# Patient Record
Sex: MALE | Race: WHITE | HISPANIC OR LATINO
[De-identification: names, ages, dates, MRNs, and addresses within clinical notes are randomized per-mention and may not be internally consistent; named-entity substitution may affect disease eponyms.]

---

## 2017-01-03 ENCOUNTER — APPOINTMENT (OUTPATIENT)
Dept: FAMILY MEDICINE | Facility: CLINIC | Age: 72
End: 2017-01-03

## 2017-01-03 VITALS
DIASTOLIC BLOOD PRESSURE: 66 MMHG | HEART RATE: 77 BPM | RESPIRATION RATE: 12 BRPM | SYSTOLIC BLOOD PRESSURE: 141 MMHG | OXYGEN SATURATION: 97 % | TEMPERATURE: 98.2 F

## 2017-04-07 ENCOUNTER — NON-APPOINTMENT (OUTPATIENT)
Age: 72
End: 2017-04-07

## 2017-04-07 ENCOUNTER — APPOINTMENT (OUTPATIENT)
Dept: INTERNAL MEDICINE | Facility: CLINIC | Age: 72
End: 2017-04-07

## 2017-04-07 ENCOUNTER — OTHER (OUTPATIENT)
Age: 72
End: 2017-04-07

## 2017-04-07 VITALS
DIASTOLIC BLOOD PRESSURE: 82 MMHG | OXYGEN SATURATION: 98 % | HEART RATE: 71 BPM | HEIGHT: 67 IN | SYSTOLIC BLOOD PRESSURE: 145 MMHG | BODY MASS INDEX: 28.56 KG/M2 | WEIGHT: 182 LBS

## 2017-04-07 DIAGNOSIS — M19.90 UNSPECIFIED OSTEOARTHRITIS, UNSPECIFIED SITE: ICD-10-CM

## 2017-04-07 RX ORDER — AZITHROMYCIN 250 MG/1
250 TABLET, FILM COATED ORAL
Qty: 1 | Refills: 0 | Status: DISCONTINUED | COMMUNITY
Start: 2017-01-03 | End: 2017-04-07

## 2017-04-10 LAB
ALBUMIN SERPL ELPH-MCNC: 4.2 G/DL
ALP BLD-CCNC: 98 U/L
ALT SERPL-CCNC: 15 U/L
ANION GAP SERPL CALC-SCNC: 11 MMOL/L
AST SERPL-CCNC: 14 U/L
BASOPHILS # BLD AUTO: 0.02 K/UL
BASOPHILS NFR BLD AUTO: 0.3 %
BILIRUB SERPL-MCNC: 0.4 MG/DL
BUN SERPL-MCNC: 16 MG/DL
CALCIUM SERPL-MCNC: 8.6 MG/DL
CHLORIDE SERPL-SCNC: 105 MMOL/L
CHOLEST SERPL-MCNC: 135 MG/DL
CHOLEST/HDLC SERPL: 3.3 RATIO
CO2 SERPL-SCNC: 26 MMOL/L
CREAT SERPL-MCNC: 0.89 MG/DL
EOSINOPHIL # BLD AUTO: 0.36 K/UL
EOSINOPHIL NFR BLD AUTO: 5.3 %
GLUCOSE SERPL-MCNC: 118 MG/DL
HBA1C MFR BLD HPLC: 6.6 %
HCT VFR BLD CALC: 43.6 %
HDLC SERPL-MCNC: 41 MG/DL
HGB BLD-MCNC: 14.5 G/DL
IMM GRANULOCYTES NFR BLD AUTO: 0.4 %
LDLC SERPL CALC-MCNC: 73 MG/DL
LYMPHOCYTES # BLD AUTO: 2.1 K/UL
LYMPHOCYTES NFR BLD AUTO: 30.7 %
MAN DIFF?: NORMAL
MCHC RBC-ENTMCNC: 29.5 PG
MCHC RBC-ENTMCNC: 33.3 GM/DL
MCV RBC AUTO: 88.8 FL
MONOCYTES # BLD AUTO: 0.73 K/UL
MONOCYTES NFR BLD AUTO: 10.7 %
NEUTROPHILS # BLD AUTO: 3.6 K/UL
NEUTROPHILS NFR BLD AUTO: 52.6 %
PLATELET # BLD AUTO: 220 K/UL
POTASSIUM SERPL-SCNC: 4.1 MMOL/L
PROT SERPL-MCNC: 6.2 G/DL
PSA FREE FLD-MCNC: 48.7 %
PSA FREE SERPL-MCNC: 0.15 NG/ML
PSA SERPL-MCNC: 0.31 NG/ML
RBC # BLD: 4.91 M/UL
RBC # FLD: 14.1 %
SODIUM SERPL-SCNC: 142 MMOL/L
TRIGL SERPL-MCNC: 105 MG/DL
WBC # FLD AUTO: 6.84 K/UL

## 2017-04-12 ENCOUNTER — RX RENEWAL (OUTPATIENT)
Age: 72
End: 2017-04-12

## 2017-04-14 ENCOUNTER — APPOINTMENT (OUTPATIENT)
Dept: INTERNAL MEDICINE | Facility: CLINIC | Age: 72
End: 2017-04-14

## 2017-04-14 VITALS
RESPIRATION RATE: 12 BRPM | SYSTOLIC BLOOD PRESSURE: 117 MMHG | HEART RATE: 76 BPM | DIASTOLIC BLOOD PRESSURE: 75 MMHG | BODY MASS INDEX: 28.72 KG/M2 | WEIGHT: 183 LBS | HEIGHT: 67 IN

## 2017-04-14 LAB — GLUCOSE BLDC GLUCOMTR-MCNC: NORMAL

## 2017-07-17 ENCOUNTER — APPOINTMENT (OUTPATIENT)
Dept: INTERNAL MEDICINE | Facility: CLINIC | Age: 72
End: 2017-07-17

## 2017-07-17 VITALS
RESPIRATION RATE: 14 BRPM | DIASTOLIC BLOOD PRESSURE: 70 MMHG | BODY MASS INDEX: 28.25 KG/M2 | HEIGHT: 67 IN | OXYGEN SATURATION: 96 % | HEART RATE: 78 BPM | SYSTOLIC BLOOD PRESSURE: 129 MMHG | WEIGHT: 180 LBS

## 2017-07-17 LAB
GLUCOSE BLDC GLUCOMTR-MCNC: NORMAL
HBA1C MFR BLD HPLC: 6.3 %

## 2018-01-19 ENCOUNTER — APPOINTMENT (OUTPATIENT)
Dept: INTERNAL MEDICINE | Facility: CLINIC | Age: 73
End: 2018-01-19

## 2018-01-22 ENCOUNTER — APPOINTMENT (OUTPATIENT)
Dept: INTERNAL MEDICINE | Facility: CLINIC | Age: 73
End: 2018-01-22
Payer: MEDICARE

## 2018-01-22 VITALS
HEART RATE: 67 BPM | BODY MASS INDEX: 27.89 KG/M2 | SYSTOLIC BLOOD PRESSURE: 177 MMHG | HEIGHT: 67 IN | DIASTOLIC BLOOD PRESSURE: 83 MMHG | WEIGHT: 177.7 LBS | OXYGEN SATURATION: 97 %

## 2018-01-22 PROCEDURE — 99213 OFFICE O/P EST LOW 20 MIN: CPT

## 2018-03-29 ENCOUNTER — FORM ENCOUNTER (OUTPATIENT)
Age: 73
End: 2018-03-29

## 2018-03-30 ENCOUNTER — APPOINTMENT (OUTPATIENT)
Dept: RADIOLOGY | Facility: HOSPITAL | Age: 73
End: 2018-03-30

## 2018-03-30 ENCOUNTER — OUTPATIENT (OUTPATIENT)
Dept: OUTPATIENT SERVICES | Facility: HOSPITAL | Age: 73
LOS: 1 days | End: 2018-03-30
Payer: MEDICARE

## 2018-03-30 ENCOUNTER — APPOINTMENT (OUTPATIENT)
Dept: INTERNAL MEDICINE | Facility: CLINIC | Age: 73
End: 2018-03-30
Payer: COMMERCIAL

## 2018-03-30 VITALS
HEART RATE: 66 BPM | TEMPERATURE: 97.4 F | BODY MASS INDEX: 27.62 KG/M2 | WEIGHT: 176 LBS | RESPIRATION RATE: 14 BRPM | SYSTOLIC BLOOD PRESSURE: 195 MMHG | DIASTOLIC BLOOD PRESSURE: 84 MMHG | HEIGHT: 67 IN | OXYGEN SATURATION: 96 %

## 2018-03-30 DIAGNOSIS — Z98.89 OTHER SPECIFIED POSTPROCEDURAL STATES: Chronic | ICD-10-CM

## 2018-03-30 DIAGNOSIS — J92.9 PLEURAL PLAQUE WITHOUT ASBESTOS: ICD-10-CM

## 2018-03-30 DIAGNOSIS — J45.909 UNSPECIFIED ASTHMA, UNCOMPLICATED: ICD-10-CM

## 2018-03-30 PROCEDURE — 71046 X-RAY EXAM CHEST 2 VIEWS: CPT

## 2018-03-30 PROCEDURE — 71046 X-RAY EXAM CHEST 2 VIEWS: CPT | Mod: 26

## 2018-03-30 PROCEDURE — 99214 OFFICE O/P EST MOD 30 MIN: CPT | Mod: 25

## 2018-03-30 RX ORDER — IPRATROPIUM BROMIDE 0.5 MG/2.5ML
0.02 SOLUTION RESPIRATORY (INHALATION) EVERY 4 HOURS
Qty: 1 | Refills: 0 | Status: ACTIVE | COMMUNITY
Start: 2018-03-30 | End: 1900-01-01

## 2018-04-09 ENCOUNTER — APPOINTMENT (OUTPATIENT)
Dept: INTERNAL MEDICINE | Facility: CLINIC | Age: 73
End: 2018-04-09

## 2018-04-13 ENCOUNTER — APPOINTMENT (OUTPATIENT)
Dept: INTERNAL MEDICINE | Facility: CLINIC | Age: 73
End: 2018-04-13
Payer: MEDICARE

## 2018-04-13 VITALS
RESPIRATION RATE: 12 BRPM | HEIGHT: 67 IN | DIASTOLIC BLOOD PRESSURE: 77 MMHG | HEART RATE: 92 BPM | WEIGHT: 173 LBS | BODY MASS INDEX: 27.15 KG/M2 | SYSTOLIC BLOOD PRESSURE: 127 MMHG | OXYGEN SATURATION: 97 %

## 2018-04-13 LAB
CARD LOT #: 3341
CARD LOT EXP DATE: NORMAL
DATE COLLECTED: NORMAL
DEVELOPER LOT #: NORMAL
DEVELOPER LOT EXP DATE: NORMAL
HEMOCCULT SP1 STL QL: NEGATIVE
QUALITY CONTROL: NO

## 2018-04-13 PROCEDURE — G0439: CPT

## 2018-04-13 PROCEDURE — 94200 LUNG FUNCTION TEST (MBC/MVV): CPT

## 2018-04-13 PROCEDURE — 82270 OCCULT BLOOD FECES: CPT

## 2018-06-12 ENCOUNTER — APPOINTMENT (OUTPATIENT)
Dept: CT IMAGING | Facility: HOSPITAL | Age: 73
End: 2018-06-12
Payer: MEDICARE

## 2018-06-12 ENCOUNTER — OUTPATIENT (OUTPATIENT)
Dept: OUTPATIENT SERVICES | Facility: HOSPITAL | Age: 73
LOS: 1 days | End: 2018-06-12
Payer: MEDICARE

## 2018-06-12 DIAGNOSIS — Z98.89 OTHER SPECIFIED POSTPROCEDURAL STATES: Chronic | ICD-10-CM

## 2018-06-12 DIAGNOSIS — Z00.8 ENCOUNTER FOR OTHER GENERAL EXAMINATION: ICD-10-CM

## 2018-06-12 PROCEDURE — 71250 CT THORAX DX C-: CPT

## 2018-06-12 PROCEDURE — 71250 CT THORAX DX C-: CPT | Mod: 26

## 2018-06-13 ENCOUNTER — OUTPATIENT (OUTPATIENT)
Dept: OUTPATIENT SERVICES | Facility: HOSPITAL | Age: 73
LOS: 1 days | End: 2018-06-13
Payer: MEDICARE

## 2018-06-13 DIAGNOSIS — R06.00 DYSPNEA, UNSPECIFIED: ICD-10-CM

## 2018-06-13 DIAGNOSIS — Z98.89 OTHER SPECIFIED POSTPROCEDURAL STATES: Chronic | ICD-10-CM

## 2018-06-13 PROCEDURE — 94726 PLETHYSMOGRAPHY LUNG VOLUMES: CPT | Mod: 26

## 2018-06-13 PROCEDURE — 94060 EVALUATION OF WHEEZING: CPT

## 2018-06-13 PROCEDURE — 94726 PLETHYSMOGRAPHY LUNG VOLUMES: CPT

## 2018-06-13 PROCEDURE — 94729 DIFFUSING CAPACITY: CPT

## 2018-06-13 PROCEDURE — 94729 DIFFUSING CAPACITY: CPT | Mod: 26

## 2018-06-13 PROCEDURE — 94060 EVALUATION OF WHEEZING: CPT | Mod: 26

## 2018-06-18 DIAGNOSIS — J92.9 PLEURAL PLAQUE WITHOUT ASBESTOS: ICD-10-CM

## 2018-07-16 ENCOUNTER — RX RENEWAL (OUTPATIENT)
Age: 73
End: 2018-07-16

## 2018-08-29 LAB — HBA1C MFR BLD HPLC: 6.4 %

## 2018-11-30 ENCOUNTER — APPOINTMENT (OUTPATIENT)
Dept: INTERNAL MEDICINE | Facility: CLINIC | Age: 73
End: 2018-11-30
Payer: MEDICARE

## 2018-11-30 VITALS
SYSTOLIC BLOOD PRESSURE: 145 MMHG | RESPIRATION RATE: 14 BRPM | DIASTOLIC BLOOD PRESSURE: 60 MMHG | BODY MASS INDEX: 27 KG/M2 | WEIGHT: 172 LBS | OXYGEN SATURATION: 97 % | HEIGHT: 67 IN | HEART RATE: 88 BPM

## 2018-11-30 DIAGNOSIS — R10.9 UNSPECIFIED ABDOMINAL PAIN: ICD-10-CM

## 2018-11-30 LAB — GLUCOSE BLDC GLUCOMTR-MCNC: NORMAL

## 2018-11-30 PROCEDURE — 99213 OFFICE O/P EST LOW 20 MIN: CPT

## 2018-11-30 PROCEDURE — 82962 GLUCOSE BLOOD TEST: CPT

## 2018-11-30 NOTE — HISTORY OF PRESENT ILLNESS
[de-identified] : This visit is to monitor the status of this patient's diabetes. The last visit was       months ago. Compliance is good with medications, fair with diet and poor with exercise. There are no reported symptoms of high or low glucose. There is no reported change in vision. There is no reported unexplained weight gain or loss. There are no reported problems with the feet. SMBG is done regularly and values have been OK. The last glycohemoglobin was  6.4    on   8/18      . \par \par He has been having pain on his left flank area. No hematuria.\par Hx.of kidney stones 40 yrs.

## 2018-12-03 LAB
APPEARANCE: CLEAR
BILIRUBIN URINE: NEGATIVE
BLOOD URINE: NEGATIVE
COLOR: YELLOW
GLUCOSE QUALITATIVE U: NEGATIVE MG/DL
HBA1C MFR BLD HPLC: 6.5 %
KETONES URINE: NEGATIVE
LEUKOCYTE ESTERASE URINE: NEGATIVE
NITRITE URINE: NEGATIVE
PH URINE: 5.5
PROTEIN URINE: NEGATIVE MG/DL
SPECIFIC GRAVITY URINE: 1.01
UROBILINOGEN URINE: NEGATIVE MG/DL

## 2019-04-19 ENCOUNTER — APPOINTMENT (OUTPATIENT)
Dept: INTERNAL MEDICINE | Facility: CLINIC | Age: 74
End: 2019-04-19
Payer: MEDICARE

## 2019-04-19 VITALS
WEIGHT: 165 LBS | BODY MASS INDEX: 25.9 KG/M2 | HEIGHT: 67 IN | RESPIRATION RATE: 12 BRPM | DIASTOLIC BLOOD PRESSURE: 70 MMHG | HEART RATE: 81 BPM | OXYGEN SATURATION: 97 % | SYSTOLIC BLOOD PRESSURE: 140 MMHG

## 2019-04-19 LAB — GLUCOSE BLDC GLUCOMTR-MCNC: NORMAL

## 2019-04-19 PROCEDURE — 99397 PER PM REEVAL EST PAT 65+ YR: CPT | Mod: 25

## 2019-04-19 PROCEDURE — 90670 PCV13 VACCINE IM: CPT

## 2019-04-19 PROCEDURE — G0009: CPT

## 2019-04-19 NOTE — HEALTH RISK ASSESSMENT
[Fair] : ~his/her~ current health as fair  [Good] : ~his/her~  mood as  good [0] : 2) Feeling down, depressed, or hopeless: Not at all (0) [Patient reported colonoscopy was normal] : Patient reported colonoscopy was normal [Reports changes in vision] : Reports changes in vision [Designated Healthcare Proxy] : Designated healthcare proxy [Name: ___] : Health Care Proxy's Name: [unfilled]  [Relationship: ___] : Relationship: [unfilled] [FreeTextEntry1] : breathing difficulty;  [] : No [YearQuit] : 1987 [de-identified] : none [de-identified] : healthy [VAW4Cijth] : 0 [Reports changes in hearing] : Reports no changes in hearing [Reports changes in dental health] : Reports no changes in dental health [de-identified] : cataracts [ColonoscopyDate] : 8-12 [de-identified] : non dental visit

## 2019-04-19 NOTE — PHYSICAL EXAM
[No Acute Distress] : no acute distress [Well Nourished] : well nourished [Well Developed] : well developed [Well-Appearing] : well-appearing [Normal Sclera/Conjunctiva] : normal sclera/conjunctiva [EOMI] : extraocular movements intact [PERRL] : pupils equal round and reactive to light [Normal Outer Ear/Nose] : the outer ears and nose were normal in appearance [No JVD] : no jugular venous distention [Normal Oropharynx] : the oropharynx was normal [No Lymphadenopathy] : no lymphadenopathy [Supple] : supple [Thyroid Normal, No Nodules] : the thyroid was normal and there were no nodules present [No Respiratory Distress] : no respiratory distress  [Clear to Auscultation] : lungs were clear to auscultation bilaterally [Normal Rate] : normal rate  [No Accessory Muscle Use] : no accessory muscle use [Regular Rhythm] : with a regular rhythm [No Murmur] : no murmur heard [Normal S1, S2] : normal S1 and S2 [No Carotid Bruits] : no carotid bruits [No Abdominal Bruit] : a ~M bruit was not heard ~T in the abdomen [No Varicosities] : no varicosities [Pedal Pulses Present] : the pedal pulses are present [No Edema] : there was no peripheral edema [No Extremity Clubbing/Cyanosis] : no extremity clubbing/cyanosis [Soft] : abdomen soft [No Palpable Aorta] : no palpable aorta [Non Tender] : non-tender [Non-distended] : non-distended [No Masses] : no abdominal mass palpated [No HSM] : no HSM [Normal Bowel Sounds] : normal bowel sounds [Normal Posterior Cervical Nodes] : no posterior cervical lymphadenopathy [Normal Anterior Cervical Nodes] : no anterior cervical lymphadenopathy [No CVA Tenderness] : no CVA  tenderness [No Spinal Tenderness] : no spinal tenderness [No Joint Swelling] : no joint swelling [Grossly Normal Strength/Tone] : grossly normal strength/tone [No Rash] : no rash [Normal Gait] : normal gait [Coordination Grossly Intact] : coordination grossly intact [No Focal Deficits] : no focal deficits [Deep Tendon Reflexes (DTR)] : deep tendon reflexes were 2+ and symmetric [Normal Affect] : the affect was normal [Normal Insight/Judgement] : insight and judgment were intact

## 2019-04-22 LAB
ALBUMIN SERPL ELPH-MCNC: 4.6 G/DL
ALP BLD-CCNC: 101 U/L
ALT SERPL-CCNC: 14 U/L
ANION GAP SERPL CALC-SCNC: 11 MMOL/L
AST SERPL-CCNC: 23 U/L
BASOPHILS # BLD AUTO: 0.05 K/UL
BASOPHILS NFR BLD AUTO: 0.6 %
BILIRUB SERPL-MCNC: 0.3 MG/DL
BUN SERPL-MCNC: 20 MG/DL
CALCIUM SERPL-MCNC: 9.4 MG/DL
CHLORIDE SERPL-SCNC: 104 MMOL/L
CO2 SERPL-SCNC: 26 MMOL/L
CREAT SERPL-MCNC: 1.14 MG/DL
EOSINOPHIL # BLD AUTO: 0.3 K/UL
EOSINOPHIL NFR BLD AUTO: 3.7 %
ESTIMATED AVERAGE GLUCOSE: 134 MG/DL
GLUCOSE SERPL-MCNC: 130 MG/DL
HBA1C MFR BLD HPLC: 6.3 %
HCT VFR BLD CALC: 44.1 %
HGB BLD-MCNC: 14.8 G/DL
IMM GRANULOCYTES NFR BLD AUTO: 0.4 %
LYMPHOCYTES # BLD AUTO: 2.02 K/UL
LYMPHOCYTES NFR BLD AUTO: 24.9 %
MAN DIFF?: NORMAL
MCHC RBC-ENTMCNC: 29 PG
MCHC RBC-ENTMCNC: 33.6 GM/DL
MCV RBC AUTO: 86.5 FL
MONOCYTES # BLD AUTO: 0.71 K/UL
MONOCYTES NFR BLD AUTO: 8.8 %
NEUTROPHILS # BLD AUTO: 4.99 K/UL
NEUTROPHILS NFR BLD AUTO: 61.6 %
PLATELET # BLD AUTO: 274 K/UL
POTASSIUM SERPL-SCNC: 4.2 MMOL/L
PROT SERPL-MCNC: 7 G/DL
RBC # BLD: 5.1 M/UL
RBC # FLD: 13.2 %
SODIUM SERPL-SCNC: 141 MMOL/L
WBC # FLD AUTO: 8.1 K/UL

## 2019-05-10 ENCOUNTER — APPOINTMENT (OUTPATIENT)
Dept: INTERNAL MEDICINE | Facility: CLINIC | Age: 74
End: 2019-05-10
Payer: MEDICARE

## 2019-05-10 ENCOUNTER — NON-APPOINTMENT (OUTPATIENT)
Age: 74
End: 2019-05-10

## 2019-05-10 VITALS
SYSTOLIC BLOOD PRESSURE: 140 MMHG | DIASTOLIC BLOOD PRESSURE: 78 MMHG | RESPIRATION RATE: 12 BRPM | BODY MASS INDEX: 26.68 KG/M2 | HEIGHT: 67 IN | OXYGEN SATURATION: 97 % | HEART RATE: 66 BPM | WEIGHT: 170 LBS

## 2019-05-10 DIAGNOSIS — H26.9 UNSPECIFIED CATARACT: ICD-10-CM

## 2019-05-10 PROCEDURE — 93000 ELECTROCARDIOGRAM COMPLETE: CPT

## 2019-05-10 PROCEDURE — 99214 OFFICE O/P EST MOD 30 MIN: CPT | Mod: 25

## 2019-05-10 NOTE — HISTORY OF PRESENT ILLNESS
[No Pertinent Cardiac History] : no history of aortic stenosis, atrial fibrillation, coronary artery disease, recent myocardial infarction, or implantable device/pacemaker [Asthma] : asthma [COPD] : no COPD [Sleep Apnea] : no sleep apnea [Chronic Anticoagulation] : no chronic anticoagulation [No Adverse Anesthesia Reaction] : no adverse anesthesia reaction in self or family member [Chronic Kidney Disease] : no chronic kidney disease [Diabetes] : diabetes [Moderate (4-6 METs)] : Moderate (4-6 METs) [(Patient denies any chest pain, claudication, dyspnea on exertion, orthopnea, palpitations or syncope)] : Patient denies any chest pain, claudication, dyspnea on exertion, orthopnea, palpitations or syncope [FreeTextEntry2] : May 16, 2019 [FreeTextEntry1] : Left Cataract Surgery [FreeTextEntry3] : Dr. Valdovinos [FreeTextEntry6] : Some dyspnea on exertion, from chronic asthma.

## 2019-05-10 NOTE — PHYSICAL EXAM
[Well Nourished] : well nourished [No Acute Distress] : no acute distress [Well Developed] : well developed [Normal Sclera/Conjunctiva] : normal sclera/conjunctiva [Well-Appearing] : well-appearing [EOMI] : extraocular movements intact [PERRL] : pupils equal round and reactive to light [Normal Outer Ear/Nose] : the outer ears and nose were normal in appearance [Normal Oropharynx] : the oropharynx was normal [No JVD] : no jugular venous distention [Supple] : supple [No Lymphadenopathy] : no lymphadenopathy [Thyroid Normal, No Nodules] : the thyroid was normal and there were no nodules present [Clear to Auscultation] : lungs were clear to auscultation bilaterally [No Respiratory Distress] : no respiratory distress  [No Accessory Muscle Use] : no accessory muscle use [Normal Rate] : normal rate  [Normal S1, S2] : normal S1 and S2 [Regular Rhythm] : with a regular rhythm [No Murmur] : no murmur heard [No Carotid Bruits] : no carotid bruits [No Abdominal Bruit] : a ~M bruit was not heard ~T in the abdomen [No Varicosities] : no varicosities [Pedal Pulses Present] : the pedal pulses are present [No Extremity Clubbing/Cyanosis] : no extremity clubbing/cyanosis [No Edema] : there was no peripheral edema [Soft] : abdomen soft [No Palpable Aorta] : no palpable aorta [Non Tender] : non-tender [Non-distended] : non-distended [No Masses] : no abdominal mass palpated [Normal Bowel Sounds] : normal bowel sounds [No HSM] : no HSM [Normal Posterior Cervical Nodes] : no posterior cervical lymphadenopathy [Normal Anterior Cervical Nodes] : no anterior cervical lymphadenopathy [No Spinal Tenderness] : no spinal tenderness [No Joint Swelling] : no joint swelling [No CVA Tenderness] : no CVA  tenderness [Grossly Normal Strength/Tone] : grossly normal strength/tone [No Rash] : no rash [Normal Gait] : normal gait [Coordination Grossly Intact] : coordination grossly intact [No Focal Deficits] : no focal deficits [Deep Tendon Reflexes (DTR)] : deep tendon reflexes were 2+ and symmetric [Normal Insight/Judgement] : insight and judgment were intact [Normal Affect] : the affect was normal

## 2019-09-25 ENCOUNTER — RX RENEWAL (OUTPATIENT)
Age: 74
End: 2019-09-25

## 2019-09-25 RX ORDER — IPRATROPIUM BROMIDE AND ALBUTEROL SULFATE 2.5; .5 MG/3ML; MG/3ML
0.5-2.5 (3) SOLUTION RESPIRATORY (INHALATION) 4 TIMES DAILY
Qty: 180 | Refills: 5 | Status: ACTIVE | COMMUNITY
Start: 2018-04-09 | End: 1900-01-01

## 2020-02-18 ENCOUNTER — OUTPATIENT (OUTPATIENT)
Dept: OUTPATIENT SERVICES | Facility: HOSPITAL | Age: 75
LOS: 1 days | End: 2020-02-18
Payer: MEDICARE

## 2020-02-18 ENCOUNTER — APPOINTMENT (OUTPATIENT)
Dept: CT IMAGING | Facility: HOSPITAL | Age: 75
End: 2020-02-18
Payer: MEDICARE

## 2020-02-18 DIAGNOSIS — Z98.89 OTHER SPECIFIED POSTPROCEDURAL STATES: Chronic | ICD-10-CM

## 2020-02-18 DIAGNOSIS — J47.1 BRONCHIECTASIS WITH (ACUTE) EXACERBATION: ICD-10-CM

## 2020-02-18 PROCEDURE — 71250 CT THORAX DX C-: CPT | Mod: 26

## 2020-02-18 PROCEDURE — 71250 CT THORAX DX C-: CPT

## 2020-07-06 ENCOUNTER — APPOINTMENT (OUTPATIENT)
Dept: INTERNAL MEDICINE | Facility: CLINIC | Age: 75
End: 2020-07-06
Payer: MEDICARE

## 2020-07-06 VITALS
OXYGEN SATURATION: 97 % | SYSTOLIC BLOOD PRESSURE: 140 MMHG | WEIGHT: 176 LBS | HEIGHT: 67 IN | TEMPERATURE: 97.8 F | BODY MASS INDEX: 27.62 KG/M2 | DIASTOLIC BLOOD PRESSURE: 67 MMHG | HEART RATE: 68 BPM | RESPIRATION RATE: 12 BRPM

## 2020-07-06 DIAGNOSIS — R60.9 EDEMA, UNSPECIFIED: ICD-10-CM

## 2020-07-06 DIAGNOSIS — T14.8XXA OTHER INJURY OF UNSPECIFIED BODY REGION, INITIAL ENCOUNTER: ICD-10-CM

## 2020-07-06 PROCEDURE — G0439: CPT

## 2020-07-06 RX ORDER — FLUTICASONE PROPIONATE AND SALMETEROL 250; 50 UG/1; UG/1
250-50 POWDER RESPIRATORY (INHALATION)
Refills: 0 | Status: COMPLETED | COMMUNITY
End: 2020-07-06

## 2020-07-06 RX ORDER — PREDNISONE 20 MG/1
20 TABLET ORAL
Qty: 21 | Refills: 0 | Status: DISCONTINUED | COMMUNITY
Start: 2020-01-30

## 2020-07-06 NOTE — PHYSICAL EXAM
[No Acute Distress] : no acute distress [Well-Appearing] : well-appearing [Well Developed] : well developed [Well Nourished] : well nourished [PERRL] : pupils equal round and reactive to light [Normal Sclera/Conjunctiva] : normal sclera/conjunctiva [EOMI] : extraocular movements intact [Normal Oropharynx] : the oropharynx was normal [Normal Outer Ear/Nose] : the outer ears and nose were normal in appearance [No JVD] : no jugular venous distention [Supple] : supple [No Lymphadenopathy] : no lymphadenopathy [Clear to Auscultation] : lungs were clear to auscultation bilaterally [Thyroid Normal, No Nodules] : the thyroid was normal and there were no nodules present [No Respiratory Distress] : no respiratory distress  [No Accessory Muscle Use] : no accessory muscle use [Regular Rhythm] : with a regular rhythm [Normal Rate] : normal rate  [No Carotid Bruits] : no carotid bruits [No Murmur] : no murmur heard [Normal S1, S2] : normal S1 and S2 [Pedal Pulses Present] : the pedal pulses are present [No Varicosities] : no varicosities [No Abdominal Bruit] : a ~M bruit was not heard ~T in the abdomen [No Edema] : there was no peripheral edema [No Extremity Clubbing/Cyanosis] : no extremity clubbing/cyanosis [No Palpable Aorta] : no palpable aorta [Soft] : abdomen soft [Non Tender] : non-tender [Non-distended] : non-distended [No HSM] : no HSM [No Masses] : no abdominal mass palpated [Normal Bowel Sounds] : normal bowel sounds [Normal Posterior Cervical Nodes] : no posterior cervical lymphadenopathy [Normal Anterior Cervical Nodes] : no anterior cervical lymphadenopathy [No Joint Swelling] : no joint swelling [No Spinal Tenderness] : no spinal tenderness [No CVA Tenderness] : no CVA  tenderness [Coordination Grossly Intact] : coordination grossly intact [Grossly Normal Strength/Tone] : grossly normal strength/tone [No Rash] : no rash [No Focal Deficits] : no focal deficits [Deep Tendon Reflexes (DTR)] : deep tendon reflexes were 2+ and symmetric [Normal Gait] : normal gait [de-identified] : +1 LE , non pitting edema , left leg. [Normal Insight/Judgement] : insight and judgment were intact [Normal Affect] : the affect was normal [de-identified] : Bruises on his forearms.
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
## 2020-07-06 NOTE — PLAN
[FreeTextEntry1] : Obtain consultation reports from cardio and pulm.\par Renewed lisinopril.\par Exercise prescription.

## 2020-07-06 NOTE — HISTORY OF PRESENT ILLNESS
[de-identified] : Pt. reports easy bruising, and concerns over his left ankle swelling.\par He went to a cardiologist in Feb. Had an ECHO, doesn't know the results.\par He is not taking an platelet inhibitors, ie: Asa.

## 2020-07-11 ENCOUNTER — LABORATORY RESULT (OUTPATIENT)
Age: 75
End: 2020-07-11

## 2020-07-13 LAB
25(OH)D3 SERPL-MCNC: 28.8 NG/ML
ALBUMIN SERPL ELPH-MCNC: 4.7 G/DL
ALP BLD-CCNC: 95 U/L
ALT SERPL-CCNC: 13 U/L
ANION GAP SERPL CALC-SCNC: 13 MMOL/L
AST SERPL-CCNC: 20 U/L
BASOPHILS # BLD AUTO: 0.04 K/UL
BASOPHILS NFR BLD AUTO: 0.7 %
BILIRUB SERPL-MCNC: 0.6 MG/DL
BUN SERPL-MCNC: 18 MG/DL
CALCIUM SERPL-MCNC: 9.1 MG/DL
CHLORIDE SERPL-SCNC: 102 MMOL/L
CHOLEST SERPL-MCNC: 145 MG/DL
CHOLEST/HDLC SERPL: 2.8 RATIO
CO2 SERPL-SCNC: 24 MMOL/L
CREAT SERPL-MCNC: 1.15 MG/DL
EOSINOPHIL # BLD AUTO: 0.27 K/UL
EOSINOPHIL NFR BLD AUTO: 4.6 %
ESTIMATED AVERAGE GLUCOSE: 134 MG/DL
GLUCOSE SERPL-MCNC: 111 MG/DL
HBA1C MFR BLD HPLC: 6.3 %
HCT VFR BLD CALC: 46.5 %
HDLC SERPL-MCNC: 52 MG/DL
HGB BLD-MCNC: 15 G/DL
IMM GRANULOCYTES NFR BLD AUTO: 0.5 %
LDLC SERPL CALC-MCNC: 82 MG/DL
LYMPHOCYTES # BLD AUTO: 1.76 K/UL
LYMPHOCYTES NFR BLD AUTO: 29.8 %
MAN DIFF?: NORMAL
MCHC RBC-ENTMCNC: 28.8 PG
MCHC RBC-ENTMCNC: 32.3 GM/DL
MCV RBC AUTO: 89.4 FL
MONOCYTES # BLD AUTO: 0.58 K/UL
MONOCYTES NFR BLD AUTO: 9.8 %
NEUTROPHILS # BLD AUTO: 3.22 K/UL
NEUTROPHILS NFR BLD AUTO: 54.6 %
PLATELET # BLD AUTO: 257 K/UL
POTASSIUM SERPL-SCNC: 4.5 MMOL/L
PROT SERPL-MCNC: 6.6 G/DL
PSA FREE FLD-MCNC: 35 %
PSA FREE SERPL-MCNC: 0.21 NG/ML
PSA SERPL-MCNC: 0.58 NG/ML
RBC # BLD: 5.2 M/UL
RBC # FLD: 13.7 %
SARS-COV-2 IGG SERPL IA-ACNC: 0.16 INDEX
SARS-COV-2 IGG SERPL QL IA: NEGATIVE
SODIUM SERPL-SCNC: 139 MMOL/L
TRIGL SERPL-MCNC: 54 MG/DL
TSH SERPL-ACNC: 2.91 UIU/ML
WBC # FLD AUTO: 5.9 K/UL

## 2020-07-20 LAB — T4 FREE SERPL DIALY-MCNC: 1.1 NG/DL

## 2020-08-18 ENCOUNTER — APPOINTMENT (OUTPATIENT)
Dept: FAMILY MEDICINE | Facility: CLINIC | Age: 75
End: 2020-08-18
Payer: MEDICARE

## 2020-08-18 VITALS
HEIGHT: 67 IN | WEIGHT: 173.06 LBS | HEART RATE: 59 BPM | BODY MASS INDEX: 27.16 KG/M2 | SYSTOLIC BLOOD PRESSURE: 150 MMHG | TEMPERATURE: 98.5 F | RESPIRATION RATE: 16 BRPM | OXYGEN SATURATION: 97 % | DIASTOLIC BLOOD PRESSURE: 72 MMHG

## 2020-08-18 DIAGNOSIS — M54.5 LOW BACK PAIN: ICD-10-CM

## 2020-08-18 PROCEDURE — 99214 OFFICE O/P EST MOD 30 MIN: CPT

## 2020-08-18 NOTE — ASSESSMENT
[FreeTextEntry1] : medrol\par ice to lumbar spine/ buttocks\par do not use advil/alleve motrin for now with medrol\par may use tylenol if needed\par take with food\par if no improvement follob back

## 2020-08-18 NOTE — PHYSICAL EXAM
[Pedal Pulses Present] : the pedal pulses are present [No Edema] : there was no peripheral edema [No Joint Swelling] : no joint swelling [Normal] : the sensory exam was normal [Motor Strength Lower Extremities Left] : there was weakness of the left lower extremity [de-identified] : negative straight leg raise

## 2020-08-18 NOTE — HISTORY OF PRESENT ILLNESS
[FreeTextEntry8] : Pt is having low back pain that radiates down right leg, says it started 4-5 days ago out of nowhere - no hx of trauma or injury. Pt said 20 years ago that they told him he had a herniated disc but no other hx of back pain. Pt has tried tylenol arthritis 2Q4H, motrin regular strenght two in the morning, no relief. He also tried heating pad, no relief with this one either. He tried salonpas patch with no relief. He is not sure of anything that makes it worse, not sure of anything that makes it better. He denies any bowel/bladder changes or numbness/tingling between the legs. He deneis fever, chills, N/V/D/C, chest pain, palpitations, or change in SOB/cough from baseline. Pt has had difficulties ambulating with back pain but also has had knee issues in the past. He does not know how to describe the pain other than "it feels like someone is eating my bones". Pt has not had any imaging of back done recently.

## 2020-12-28 ENCOUNTER — APPOINTMENT (OUTPATIENT)
Dept: FAMILY MEDICINE | Facility: CLINIC | Age: 75
End: 2020-12-28
Payer: MEDICARE

## 2020-12-28 ENCOUNTER — TRANSCRIPTION ENCOUNTER (OUTPATIENT)
Age: 75
End: 2020-12-28

## 2020-12-28 ENCOUNTER — NON-APPOINTMENT (OUTPATIENT)
Age: 75
End: 2020-12-28

## 2020-12-28 PROCEDURE — 99442: CPT

## 2021-01-08 ENCOUNTER — APPOINTMENT (OUTPATIENT)
Dept: INTERNAL MEDICINE | Facility: CLINIC | Age: 76
End: 2021-01-08

## 2021-02-11 ENCOUNTER — TRANSCRIPTION ENCOUNTER (OUTPATIENT)
Age: 76
End: 2021-02-11

## 2021-02-17 ENCOUNTER — APPOINTMENT (OUTPATIENT)
Dept: FAMILY MEDICINE | Facility: CLINIC | Age: 76
End: 2021-02-17
Payer: MEDICARE

## 2021-02-17 VITALS
BODY MASS INDEX: 26.7 KG/M2 | OXYGEN SATURATION: 96 % | HEIGHT: 67 IN | SYSTOLIC BLOOD PRESSURE: 132 MMHG | RESPIRATION RATE: 14 BRPM | HEART RATE: 63 BPM | WEIGHT: 170.13 LBS | TEMPERATURE: 99.8 F | DIASTOLIC BLOOD PRESSURE: 76 MMHG

## 2021-02-17 DIAGNOSIS — R10.9 UNSPECIFIED ABDOMINAL PAIN: ICD-10-CM

## 2021-02-17 DIAGNOSIS — Z87.442 PERSONAL HISTORY OF URINARY CALCULI: ICD-10-CM

## 2021-02-17 LAB
BILIRUB UR QL STRIP: NEGATIVE
CLARITY UR: CLEAR
COLLECTION METHOD: NORMAL
GLUCOSE UR-MCNC: NEGATIVE
HCG UR QL: 0.2 EU/DL
HGB UR QL STRIP.AUTO: NEGATIVE
KETONES UR-MCNC: NEGATIVE
LEUKOCYTE ESTERASE UR QL STRIP: NEGATIVE
NITRITE UR QL STRIP: NEGATIVE
PH UR STRIP: 7
PROT UR STRIP-MCNC: NEGATIVE
SP GR UR STRIP: >=1.005

## 2021-02-17 PROCEDURE — 99072 ADDL SUPL MATRL&STAF TM PHE: CPT

## 2021-02-17 PROCEDURE — 81003 URINALYSIS AUTO W/O SCOPE: CPT | Mod: QW

## 2021-02-17 PROCEDURE — 99214 OFFICE O/P EST MOD 30 MIN: CPT

## 2021-02-17 NOTE — PLAN
[FreeTextEntry1] : \par  Advised patient urine was negative. We can order a CT renal stone hunt to try to visualize, will work on prior auth - he does not need to get if feeling better/stone passes. Advised patient to go to hospital/call if pain gets worse, n/v, etc. Discussed w/ pt otc tylenol/nsaids for pain, continuing increasing fluid intake to help pass stone, continue to check urine for stone. Patient verbalized understanding.

## 2021-02-17 NOTE — PHYSICAL EXAM
[Normal Sclera/Conjunctiva] : normal sclera/conjunctiva [No Spinal Tenderness] : no spinal tenderness [Coordination Grossly Intact] : coordination grossly intact [No Focal Deficits] : no focal deficits [Normal Gait] : normal gait [Normal] : affect was normal and insight and judgment were intact [de-identified] : CVA tenderness right side, none left side

## 2021-02-17 NOTE — HISTORY OF PRESENT ILLNESS
[FreeTextEntry8] : Pt reports flank pain for past 3 days on right side, has hx of kidney stones in past (10 years ago) same side - felt the same. Pt says last kidney stone he was in the hospital, was able to pass while he was there - saw it come out last time. He says doctor at hospital last time told him if stone did not pass he would need surgery. He has increased fluids, also taking some cranberry juice. Pt reports it is hard to bend down, reports 7/10 pain - worse when actively doing things. Pt has not taken any otc medications. Pt reports it is better with cranberry juice. Pt denies changes to bowel/bladder - denies any numbness/tingling. He denies fevers, chills, new changes ambulating, n/v, no blood in urine (has not noticed any stones in urine), dysuria, urgency of frequency. Pt denies any allergies. Pt denies any hx of back pain, reports feels different than usual back pain.  He denies any other symptoms and has no other health concerns today.\par

## 2021-02-19 ENCOUNTER — OUTPATIENT (OUTPATIENT)
Dept: OUTPATIENT SERVICES | Facility: HOSPITAL | Age: 76
LOS: 1 days | End: 2021-02-19
Payer: MEDICARE

## 2021-02-19 ENCOUNTER — RESULT REVIEW (OUTPATIENT)
Age: 76
End: 2021-02-19

## 2021-02-19 ENCOUNTER — NON-APPOINTMENT (OUTPATIENT)
Age: 76
End: 2021-02-19

## 2021-02-19 ENCOUNTER — APPOINTMENT (OUTPATIENT)
Dept: CT IMAGING | Facility: HOSPITAL | Age: 76
End: 2021-02-19
Payer: MEDICARE

## 2021-02-19 DIAGNOSIS — Z98.89 OTHER SPECIFIED POSTPROCEDURAL STATES: Chronic | ICD-10-CM

## 2021-02-19 DIAGNOSIS — Z87.442 PERSONAL HISTORY OF URINARY CALCULI: ICD-10-CM

## 2021-02-19 DIAGNOSIS — R10.9 UNSPECIFIED ABDOMINAL PAIN: ICD-10-CM

## 2021-02-19 PROCEDURE — 74176 CT ABD & PELVIS W/O CONTRAST: CPT

## 2021-02-19 PROCEDURE — 74176 CT ABD & PELVIS W/O CONTRAST: CPT | Mod: 26

## 2021-05-04 ENCOUNTER — APPOINTMENT (OUTPATIENT)
Dept: FAMILY MEDICINE | Facility: CLINIC | Age: 76
End: 2021-05-04
Payer: MEDICARE

## 2021-05-04 VITALS
SYSTOLIC BLOOD PRESSURE: 150 MMHG | HEART RATE: 70 BPM | TEMPERATURE: 97.5 F | HEIGHT: 67 IN | BODY MASS INDEX: 27.03 KG/M2 | RESPIRATION RATE: 16 BRPM | WEIGHT: 172.25 LBS | DIASTOLIC BLOOD PRESSURE: 72 MMHG | OXYGEN SATURATION: 96 %

## 2021-05-04 DIAGNOSIS — M77.00 MEDIAL EPICONDYLITIS, UNSPECIFIED ELBOW: ICD-10-CM

## 2021-05-04 PROCEDURE — 99072 ADDL SUPL MATRL&STAF TM PHE: CPT

## 2021-05-04 PROCEDURE — 99214 OFFICE O/P EST MOD 30 MIN: CPT

## 2021-05-04 RX ORDER — OSELTAMIVIR PHOSPHATE 75 MG/1
75 CAPSULE ORAL DAILY
Qty: 10 | Refills: 3 | Status: DISCONTINUED | COMMUNITY
Start: 2020-03-09 | End: 2021-05-04

## 2021-05-10 LAB
ALBUMIN SERPL ELPH-MCNC: 4.2 G/DL
ALP BLD-CCNC: 94 U/L
ALT SERPL-CCNC: 14 U/L
ANION GAP SERPL CALC-SCNC: 10 MMOL/L
AST SERPL-CCNC: 15 U/L
BASOPHILS # BLD AUTO: 0.04 K/UL
BASOPHILS NFR BLD AUTO: 0.6 %
BILIRUB SERPL-MCNC: 0.5 MG/DL
BUN SERPL-MCNC: 17 MG/DL
CALCIUM SERPL-MCNC: 8.9 MG/DL
CHLORIDE SERPL-SCNC: 108 MMOL/L
CHOLEST SERPL-MCNC: 150 MG/DL
CO2 SERPL-SCNC: 25 MMOL/L
CREAT SERPL-MCNC: 1.05 MG/DL
EOSINOPHIL # BLD AUTO: 0.36 K/UL
EOSINOPHIL NFR BLD AUTO: 5.1 %
ESTIMATED AVERAGE GLUCOSE: 137 MG/DL
GLUCOSE SERPL-MCNC: 118 MG/DL
HBA1C MFR BLD HPLC: 6.4 %
HCT VFR BLD CALC: 44.3 %
HDLC SERPL-MCNC: 54 MG/DL
HGB BLD-MCNC: 14.4 G/DL
IMM GRANULOCYTES NFR BLD AUTO: 0.4 %
LDLC SERPL CALC-MCNC: 85 MG/DL
LYMPHOCYTES # BLD AUTO: 1.39 K/UL
LYMPHOCYTES NFR BLD AUTO: 19.7 %
MAN DIFF?: NORMAL
MCHC RBC-ENTMCNC: 29.1 PG
MCHC RBC-ENTMCNC: 32.5 GM/DL
MCV RBC AUTO: 89.7 FL
MONOCYTES # BLD AUTO: 0.6 K/UL
MONOCYTES NFR BLD AUTO: 8.5 %
NEUTROPHILS # BLD AUTO: 4.65 K/UL
NEUTROPHILS NFR BLD AUTO: 65.7 %
NONHDLC SERPL-MCNC: 96 MG/DL
PLATELET # BLD AUTO: 268 K/UL
POTASSIUM SERPL-SCNC: 5 MMOL/L
PROT SERPL-MCNC: 6.5 G/DL
RBC # BLD: 4.94 M/UL
RBC # FLD: 13.4 %
SODIUM SERPL-SCNC: 143 MMOL/L
TRIGL SERPL-MCNC: 55 MG/DL
URATE SERPL-MCNC: 5.6 MG/DL
WBC # FLD AUTO: 7.07 K/UL

## 2021-05-10 NOTE — HISTORY OF PRESENT ILLNESS
[FreeTextEntry8] : Pt reports he noted redness R elbow two days ago, just noticed swelling. He reports hx of arthritis, he has been taking tylenol arthritis 2 pills yesterday with no relief, today took 2 advil liquigels at 2 am and 2 at 12 pm, helped a little that he can move elbow. He reports he tried ice and heat on elbow with no relief. He reports that this has never happened in the past to him before. He denies any trauma or injury to the elbow. Pt reports improvement since yesterday. Denies any hx of gout, numbness, tingling, or change in sensation. Pt reports that his pain was 9/10 yesterday, today 6/10 pain. He reports that he is right handed. 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 Pt denies any fevers, chills, or any other symptoms. He is unsure if redness is from putting ice on elbow. Patient reports he did not take his blood pressure medication today. No other health concerns.

## 2021-05-10 NOTE — PLAN
[FreeTextEntry1] : \par Discussed with patient likely d/t overuse no other signs of infection, will try rice and if no improvement, erythema worsening he has referral to set up with ortho explained could need aspiration to look at fluid/will also call me if worsening/no improvement. Reassuring that patient reports better after icing and nsaid use. Counseled patient he must take blood pressure medication daily and to check bps at home/keep log - discussed nsaids can elevate this use sparingly and use tylenol. He was provided with work note for rest of week because cleaning with aggravate, discussed the need for it to rest. He has not had routine blood work since summer will check this and uric acid level. Advised patient to call with any questions or concerns. Patient verbalized understanding. \par \par Educated patient to rest/avoid movements that cause pain, ice elbow with cloth between ice and skin (every 1 to 2 hours, for 15 minutes each time after initial injury), use of compression such as elastic band wrap/other wraps to injury, and to elevate injury above level of heart (propping on pillows etc). Advised patient they can alternate between nsaids (ibuprofen, naproxen) and tylenol otc prn pain relief as directed. If pain worsening, redness, swelling increase etc., discussed with patient to call office/return for follow up. Patient verbalized understanding.

## 2021-05-10 NOTE — ADDENDUM
[FreeTextEntry1] : Left message for patient blood work wnl only thing flagging is blood sugar, has been within prediabetic range for a while now. Patient will be seeing Dr. Jacome in july for cpe can review in detail at that time. Advised patient to call with any questions/concerns and to let us know how he is feeling.

## 2021-05-10 NOTE — PHYSICAL EXAM
[Normal Sclera/Conjunctiva] : normal sclera/conjunctiva [Grossly Normal Strength/Tone] : grossly normal strength/tone [Coordination Grossly Intact] : coordination grossly intact [No Focal Deficits] : no focal deficits [Normal Gait] : normal gait [Normal] : affect was normal and insight and judgment were intact [de-identified] : swelling to medical aspect of epicondyle, some redness

## 2021-07-09 ENCOUNTER — APPOINTMENT (OUTPATIENT)
Dept: INTERNAL MEDICINE | Facility: CLINIC | Age: 76
End: 2021-07-09
Payer: MEDICARE

## 2021-07-09 VITALS
HEIGHT: 67 IN | WEIGHT: 169 LBS | DIASTOLIC BLOOD PRESSURE: 70 MMHG | TEMPERATURE: 98 F | SYSTOLIC BLOOD PRESSURE: 142 MMHG | BODY MASS INDEX: 26.53 KG/M2 | OXYGEN SATURATION: 98 % | RESPIRATION RATE: 12 BRPM | HEART RATE: 68 BPM

## 2021-07-09 DIAGNOSIS — R73.01 IMPAIRED FASTING GLUCOSE: ICD-10-CM

## 2021-07-09 PROCEDURE — G0439: CPT

## 2021-07-09 PROCEDURE — 99072 ADDL SUPL MATRL&STAF TM PHE: CPT

## 2021-07-09 NOTE — HEALTH RISK ASSESSMENT
[Very Good] : ~his/her~  mood as very good [No] : In the past 12 months have you used drugs other than those required for medical reasons? No [No falls in past year] : Patient reported no falls in the past year [0] : 2) Feeling down, depressed, or hopeless: Not at all (0) [HIV test declined] : HIV test declined [Hepatitis C test declined] : Hepatitis C test declined [Learning/Retaining New Information] : difficulty learning/retaining new information [None] : None [With Family] : lives with family [Retired] : retired [] :  [Feels Safe at Home] : Feels safe at home [Fully functional (bathing, dressing, toileting, transferring, walking, feeding)] : Fully functional (bathing, dressing, toileting, transferring, walking, feeding) [Fully functional (using the telephone, shopping, preparing meals, housekeeping, doing laundry, using] : Fully functional and needs no help or supervision to perform IADLs (using the telephone, shopping, preparing meals, housekeeping, doing laundry, using transportation, managing medications and managing finances) [Smoke Detector] : smoke detector [Seat Belt] :  uses seat belt [Designated Healthcare Proxy] : Designated healthcare proxy [Name: ___] : Health Care Proxy's Name: [unfilled]  [Relationship: ___] : Relationship: [unfilled] [FreeTextEntry1] : right elbow pain;  [] : No [de-identified] : yes cough  [YearQuit] : 40 years ago [Audit-CScore] : 0 [de-identified] : no  [de-identified] : healthy [[id number]] : 0 [Change in mental status noted] : No change in mental status noted [Language] : denies difficulty with language [Behavior] : denies difficulty with behavior [Handling Complex Tasks] : denies difficulty handling complex tasks [Reasoning] : denies difficulty with reasoning [Spatial Ability and Orientation] : denies difficulty with spatial ability and orientation [Sexually Active] : not sexually active [High Risk Behavior] : no high risk behavior [Reports changes in hearing] : Reports no changes in hearing [Reports changes in vision] : Reports no changes in vision [Reports normal functional visual acuity (ie: able to read med bottle)] : Reports poor functional visual acuity.  [Reports changes in dental health] : Reports no changes in dental health [ColonoscopyDate] : 4-12 [de-identified] : trouble reading.

## 2021-07-09 NOTE — PLAN
[FreeTextEntry1] : Obtain last colonoscopy report.\par Renew lisinopril.\par Reduce carbohydrates.\par Increase exercise.

## 2021-07-13 ENCOUNTER — OUTPATIENT (OUTPATIENT)
Dept: OUTPATIENT SERVICES | Facility: HOSPITAL | Age: 76
LOS: 1 days | End: 2021-07-13
Payer: MEDICARE

## 2021-07-13 ENCOUNTER — APPOINTMENT (OUTPATIENT)
Dept: CT IMAGING | Facility: HOSPITAL | Age: 76
End: 2021-07-13
Payer: MEDICARE

## 2021-07-13 DIAGNOSIS — Z77.090 CONTACT WITH AND (SUSPECTED) EXPOSURE TO ASBESTOS: ICD-10-CM

## 2021-07-13 DIAGNOSIS — Z98.89 OTHER SPECIFIED POSTPROCEDURAL STATES: Chronic | ICD-10-CM

## 2021-07-13 PROCEDURE — 71250 CT THORAX DX C-: CPT

## 2021-07-13 PROCEDURE — 71250 CT THORAX DX C-: CPT | Mod: 26

## 2021-09-22 ENCOUNTER — TRANSCRIPTION ENCOUNTER (OUTPATIENT)
Age: 76
End: 2021-09-22

## 2021-09-24 ENCOUNTER — FORM ENCOUNTER (OUTPATIENT)
Age: 76
End: 2021-09-24

## 2021-09-25 ENCOUNTER — TRANSCRIPTION ENCOUNTER (OUTPATIENT)
Age: 76
End: 2021-09-25

## 2021-09-27 ENCOUNTER — TRANSCRIPTION ENCOUNTER (OUTPATIENT)
Age: 76
End: 2021-09-27

## 2021-11-03 ENCOUNTER — TRANSCRIPTION ENCOUNTER (OUTPATIENT)
Age: 76
End: 2021-11-03

## 2021-12-12 ENCOUNTER — TRANSCRIPTION ENCOUNTER (OUTPATIENT)
Age: 76
End: 2021-12-12

## 2022-03-31 ENCOUNTER — INPATIENT (INPATIENT)
Facility: HOSPITAL | Age: 77
LOS: 1 days | Discharge: HOME | DRG: 603 | End: 2022-04-02
Attending: STUDENT IN AN ORGANIZED HEALTH CARE EDUCATION/TRAINING PROGRAM | Admitting: STUDENT IN AN ORGANIZED HEALTH CARE EDUCATION/TRAINING PROGRAM
Payer: MEDICARE

## 2022-03-31 VITALS
DIASTOLIC BLOOD PRESSURE: 75 MMHG | HEART RATE: 84 BPM | TEMPERATURE: 99 F | RESPIRATION RATE: 16 BRPM | OXYGEN SATURATION: 98 % | HEIGHT: 67 IN | WEIGHT: 169.98 LBS | SYSTOLIC BLOOD PRESSURE: 170 MMHG

## 2022-03-31 DIAGNOSIS — Z98.89 OTHER SPECIFIED POSTPROCEDURAL STATES: Chronic | ICD-10-CM

## 2022-03-31 DIAGNOSIS — L03.90 CELLULITIS, UNSPECIFIED: ICD-10-CM

## 2022-03-31 DIAGNOSIS — R09.89 OTHER SPECIFIED SYMPTOMS AND SIGNS INVOLVING THE CIRCULATORY AND RESPIRATORY SYSTEMS: ICD-10-CM

## 2022-03-31 LAB
ALBUMIN SERPL ELPH-MCNC: 3.6 G/DL — SIGNIFICANT CHANGE UP (ref 3.3–5)
ALP SERPL-CCNC: 94 U/L — SIGNIFICANT CHANGE UP (ref 30–120)
ALT FLD-CCNC: 19 U/L DA — SIGNIFICANT CHANGE UP (ref 10–60)
ANION GAP SERPL CALC-SCNC: 10 MMOL/L — SIGNIFICANT CHANGE UP (ref 5–17)
APPEARANCE UR: CLEAR — SIGNIFICANT CHANGE UP
APTT BLD: 30.3 SEC — SIGNIFICANT CHANGE UP (ref 27.5–35.5)
AST SERPL-CCNC: 22 U/L — SIGNIFICANT CHANGE UP (ref 10–40)
BACTERIA # UR AUTO: ABNORMAL
BASOPHILS # BLD AUTO: 0.03 K/UL — SIGNIFICANT CHANGE UP (ref 0–0.2)
BASOPHILS NFR BLD AUTO: 0.3 % — SIGNIFICANT CHANGE UP (ref 0–2)
BILIRUB SERPL-MCNC: 0.8 MG/DL — SIGNIFICANT CHANGE UP (ref 0.2–1.2)
BILIRUB UR-MCNC: NEGATIVE — SIGNIFICANT CHANGE UP
BUN SERPL-MCNC: 18 MG/DL — SIGNIFICANT CHANGE UP (ref 7–23)
CALCIUM SERPL-MCNC: 8.2 MG/DL — LOW (ref 8.4–10.5)
CHLORIDE SERPL-SCNC: 102 MMOL/L — SIGNIFICANT CHANGE UP (ref 96–108)
CO2 SERPL-SCNC: 24 MMOL/L — SIGNIFICANT CHANGE UP (ref 22–31)
COLOR SPEC: YELLOW — SIGNIFICANT CHANGE UP
CREAT SERPL-MCNC: 1.13 MG/DL — SIGNIFICANT CHANGE UP (ref 0.5–1.3)
DIFF PNL FLD: ABNORMAL
EGFR: 67 ML/MIN/1.73M2 — SIGNIFICANT CHANGE UP
EOSINOPHIL # BLD AUTO: 0.03 K/UL — SIGNIFICANT CHANGE UP (ref 0–0.5)
EOSINOPHIL NFR BLD AUTO: 0.3 % — SIGNIFICANT CHANGE UP (ref 0–6)
EPI CELLS # UR: SIGNIFICANT CHANGE UP
ERYTHROCYTE [SEDIMENTATION RATE] IN BLOOD: 18 MM/HR — HIGH (ref 0–9)
GLUCOSE SERPL-MCNC: 154 MG/DL — HIGH (ref 70–99)
GLUCOSE UR QL: NEGATIVE MG/DL — SIGNIFICANT CHANGE UP
HCT VFR BLD CALC: 43 % — SIGNIFICANT CHANGE UP (ref 39–50)
HGB BLD-MCNC: 14.7 G/DL — SIGNIFICANT CHANGE UP (ref 13–17)
IMM GRANULOCYTES NFR BLD AUTO: 0.5 % — SIGNIFICANT CHANGE UP (ref 0–1.5)
INR BLD: 1.15 RATIO — SIGNIFICANT CHANGE UP (ref 0.88–1.16)
KETONES UR-MCNC: NEGATIVE — SIGNIFICANT CHANGE UP
LACTATE SERPL-SCNC: 1.5 MMOL/L — SIGNIFICANT CHANGE UP (ref 0.7–2)
LEUKOCYTE ESTERASE UR-ACNC: NEGATIVE — SIGNIFICANT CHANGE UP
LYMPHOCYTES # BLD AUTO: 1.07 K/UL — SIGNIFICANT CHANGE UP (ref 1–3.3)
LYMPHOCYTES # BLD AUTO: 9.9 % — LOW (ref 13–44)
MCHC RBC-ENTMCNC: 30 PG — SIGNIFICANT CHANGE UP (ref 27–34)
MCHC RBC-ENTMCNC: 34.2 GM/DL — SIGNIFICANT CHANGE UP (ref 32–36)
MCV RBC AUTO: 87.8 FL — SIGNIFICANT CHANGE UP (ref 80–100)
MONOCYTES # BLD AUTO: 0.87 K/UL — SIGNIFICANT CHANGE UP (ref 0–0.9)
MONOCYTES NFR BLD AUTO: 8.1 % — SIGNIFICANT CHANGE UP (ref 2–14)
NEUTROPHILS # BLD AUTO: 8.72 K/UL — HIGH (ref 1.8–7.4)
NEUTROPHILS NFR BLD AUTO: 80.9 % — HIGH (ref 43–77)
NITRITE UR-MCNC: NEGATIVE — SIGNIFICANT CHANGE UP
NRBC # BLD: 0 /100 WBCS — SIGNIFICANT CHANGE UP (ref 0–0)
PH UR: 6 — SIGNIFICANT CHANGE UP (ref 5–8)
PLATELET # BLD AUTO: 218 K/UL — SIGNIFICANT CHANGE UP (ref 150–400)
POTASSIUM SERPL-MCNC: 3.8 MMOL/L — SIGNIFICANT CHANGE UP (ref 3.5–5.3)
POTASSIUM SERPL-SCNC: 3.8 MMOL/L — SIGNIFICANT CHANGE UP (ref 3.5–5.3)
PROT SERPL-MCNC: 7.2 G/DL — SIGNIFICANT CHANGE UP (ref 6–8.3)
PROT UR-MCNC: 15 MG/DL
PROTHROM AB SERPL-ACNC: 13.3 SEC — SIGNIFICANT CHANGE UP (ref 10.5–13.4)
RBC # BLD: 4.9 M/UL — SIGNIFICANT CHANGE UP (ref 4.2–5.8)
RBC # FLD: 13.2 % — SIGNIFICANT CHANGE UP (ref 10.3–14.5)
RBC CASTS # UR COMP ASSIST: ABNORMAL /HPF (ref 0–4)
SARS-COV-2 RNA SPEC QL NAA+PROBE: SIGNIFICANT CHANGE UP
SODIUM SERPL-SCNC: 136 MMOL/L — SIGNIFICANT CHANGE UP (ref 135–145)
SP GR SPEC: 1.01 — SIGNIFICANT CHANGE UP (ref 1.01–1.02)
UROBILINOGEN FLD QL: NEGATIVE MG/DL — SIGNIFICANT CHANGE UP
WBC # BLD: 10.77 K/UL — HIGH (ref 3.8–10.5)
WBC # FLD AUTO: 10.77 K/UL — HIGH (ref 3.8–10.5)

## 2022-03-31 PROCEDURE — 71046 X-RAY EXAM CHEST 2 VIEWS: CPT | Mod: 26

## 2022-03-31 PROCEDURE — 99223 1ST HOSP IP/OBS HIGH 75: CPT | Mod: AI

## 2022-03-31 PROCEDURE — 93971 EXTREMITY STUDY: CPT | Mod: 26,RT

## 2022-03-31 PROCEDURE — 73630 X-RAY EXAM OF FOOT: CPT | Mod: 26,RT

## 2022-03-31 PROCEDURE — 99285 EMERGENCY DEPT VISIT HI MDM: CPT | Mod: FS

## 2022-03-31 PROCEDURE — 93010 ELECTROCARDIOGRAM REPORT: CPT

## 2022-03-31 RX ORDER — PANTOPRAZOLE SODIUM 20 MG/1
1 TABLET, DELAYED RELEASE ORAL
Qty: 0 | Refills: 0 | DISCHARGE

## 2022-03-31 RX ORDER — BUDESONIDE, MICRONIZED 100 %
0.5 POWDER (GRAM) MISCELLANEOUS DAILY
Refills: 0 | Status: DISCONTINUED | OUTPATIENT
Start: 2022-03-31 | End: 2022-04-02

## 2022-03-31 RX ORDER — PIPERACILLIN AND TAZOBACTAM 4; .5 G/20ML; G/20ML
3.38 INJECTION, POWDER, LYOPHILIZED, FOR SOLUTION INTRAVENOUS ONCE
Refills: 0 | Status: COMPLETED | OUTPATIENT
Start: 2022-03-31 | End: 2022-03-31

## 2022-03-31 RX ORDER — LANOLIN ALCOHOL/MO/W.PET/CERES
3 CREAM (GRAM) TOPICAL AT BEDTIME
Refills: 0 | Status: DISCONTINUED | OUTPATIENT
Start: 2022-03-31 | End: 2022-04-02

## 2022-03-31 RX ORDER — VANCOMYCIN HCL 1 G
1000 VIAL (EA) INTRAVENOUS ONCE
Refills: 0 | Status: COMPLETED | OUTPATIENT
Start: 2022-03-31 | End: 2022-03-31

## 2022-03-31 RX ORDER — ARFORMOTEROL TARTRATE 15 UG/2ML
0 SOLUTION RESPIRATORY (INHALATION)
Qty: 0 | Refills: 0 | DISCHARGE

## 2022-03-31 RX ORDER — ACETAMINOPHEN 500 MG
650 TABLET ORAL ONCE
Refills: 0 | Status: COMPLETED | OUTPATIENT
Start: 2022-03-31 | End: 2022-03-31

## 2022-03-31 RX ORDER — ALBUTEROL 90 UG/1
2 AEROSOL, METERED ORAL EVERY 6 HOURS
Refills: 0 | Status: DISCONTINUED | OUTPATIENT
Start: 2022-03-31 | End: 2022-03-31

## 2022-03-31 RX ORDER — PIPERACILLIN AND TAZOBACTAM 4; .5 G/20ML; G/20ML
3.38 INJECTION, POWDER, LYOPHILIZED, FOR SOLUTION INTRAVENOUS EVERY 8 HOURS
Refills: 0 | Status: DISCONTINUED | OUTPATIENT
Start: 2022-03-31 | End: 2022-03-31

## 2022-03-31 RX ORDER — ALBUTEROL 90 UG/1
2.5 AEROSOL, METERED ORAL EVERY 12 HOURS
Refills: 0 | Status: DISCONTINUED | OUTPATIENT
Start: 2022-03-31 | End: 2022-04-02

## 2022-03-31 RX ORDER — VALSARTAN 80 MG/1
40 TABLET ORAL DAILY
Refills: 0 | Status: DISCONTINUED | OUTPATIENT
Start: 2022-03-31 | End: 2022-04-02

## 2022-03-31 RX ORDER — SODIUM CHLORIDE 9 MG/ML
2000 INJECTION INTRAMUSCULAR; INTRAVENOUS; SUBCUTANEOUS ONCE
Refills: 0 | Status: COMPLETED | OUTPATIENT
Start: 2022-03-31 | End: 2022-03-31

## 2022-03-31 RX ORDER — CEFAZOLIN SODIUM 1 G
2000 VIAL (EA) INJECTION EVERY 8 HOURS
Refills: 0 | Status: DISCONTINUED | OUTPATIENT
Start: 2022-03-31 | End: 2022-04-02

## 2022-03-31 RX ORDER — VALSARTAN 80 MG/1
1 TABLET ORAL
Qty: 0 | Refills: 0 | DISCHARGE

## 2022-03-31 RX ORDER — ACETAMINOPHEN 500 MG
650 TABLET ORAL EVERY 6 HOURS
Refills: 0 | Status: DISCONTINUED | OUTPATIENT
Start: 2022-03-31 | End: 2022-04-02

## 2022-03-31 RX ORDER — ENOXAPARIN SODIUM 100 MG/ML
40 INJECTION SUBCUTANEOUS EVERY 24 HOURS
Refills: 0 | Status: DISCONTINUED | OUTPATIENT
Start: 2022-03-31 | End: 2022-04-02

## 2022-03-31 RX ORDER — PANTOPRAZOLE SODIUM 20 MG/1
40 TABLET, DELAYED RELEASE ORAL
Refills: 0 | Status: DISCONTINUED | OUTPATIENT
Start: 2022-04-01 | End: 2022-04-02

## 2022-03-31 RX ADMIN — Medication 650 MILLIGRAM(S): at 11:09

## 2022-03-31 RX ADMIN — Medication 650 MILLIGRAM(S): at 22:35

## 2022-03-31 RX ADMIN — ENOXAPARIN SODIUM 40 MILLIGRAM(S): 100 INJECTION SUBCUTANEOUS at 16:40

## 2022-03-31 RX ADMIN — ALBUTEROL 2.5 MILLIGRAM(S): 90 AEROSOL, METERED ORAL at 19:19

## 2022-03-31 RX ADMIN — Medication 100 MILLIGRAM(S): at 11:31

## 2022-03-31 RX ADMIN — PIPERACILLIN AND TAZOBACTAM 200 GRAM(S): 4; .5 INJECTION, POWDER, LYOPHILIZED, FOR SOLUTION INTRAVENOUS at 10:54

## 2022-03-31 RX ADMIN — Medication 650 MILLIGRAM(S): at 21:35

## 2022-03-31 RX ADMIN — Medication 0.5 MILLIGRAM(S): at 19:19

## 2022-03-31 RX ADMIN — Medication 650 MILLIGRAM(S): at 10:54

## 2022-03-31 RX ADMIN — SODIUM CHLORIDE 2000 MILLILITER(S): 9 INJECTION INTRAMUSCULAR; INTRAVENOUS; SUBCUTANEOUS at 13:30

## 2022-03-31 RX ADMIN — PIPERACILLIN AND TAZOBACTAM 3.38 GRAM(S): 4; .5 INJECTION, POWDER, LYOPHILIZED, FOR SOLUTION INTRAVENOUS at 11:31

## 2022-03-31 RX ADMIN — Medication 1000 MILLIGRAM(S): at 12:30

## 2022-03-31 RX ADMIN — SODIUM CHLORIDE 2000 MILLILITER(S): 9 INJECTION INTRAMUSCULAR; INTRAVENOUS; SUBCUTANEOUS at 10:54

## 2022-03-31 RX ADMIN — Medication 100 MILLIGRAM(S): at 21:35

## 2022-03-31 NOTE — H&P ADULT - NSHPSOCIALHISTORY_GEN_ALL_CORE
Denies current alcohol, tobacco and illicit drug use Social Hx:   Denies current alcohol, drug or recreational substance use

## 2022-03-31 NOTE — ED ADULT NURSE NOTE - OBJECTIVE STATEMENT
patient fell on Monday and went urgent care center, xray done finding neg. frx, c/o redness and pain since fall, Patient denies sick contacts/ no fever/chills/cough at this time, denies SOB and no acute distress. pulse +, wife at bedside, IV accessed and tolerating.  will continue to monitor.

## 2022-03-31 NOTE — ED PROVIDER NOTE - NS ED ATTENDING STATEMENT MOD
This was a shared visit with the [facility]. I reviewed and verified the documentation and independently performed the documented:

## 2022-03-31 NOTE — H&P ADULT - ASSESSMENT
This is a 76 year old male w/ PMH of asbestos lung disease (not on home O2 but uses vibratory respiratory vest twice a day), HTN, GERD, and asthma who presents w/ right LE pain and redness s/p mechanical fall (tripped on carpet) on Monday denies LOC and head strike. Pt. rates pain at 9/10 prior to tylenol, 1/10 post tylenol. Pt. endorses erythema and swelling. Pt. denies CP, palps, dizziness, and numbness/tingling. In ED pt. found to be leukocytotic Pt. being admitted for treatment of RLE cellulitis.    # RLE cellulitis  - Evidenced by PE findings, leukocytosis w/ bandemia  - Right foot x-ray negative for acute fx  - RLE duplex negative for DVT  - 2L NS given  - Pain management w/ tylenol for now, as pt. said it was very effective  - Blood cultures sent  - Continue w/ vanco and zosyn until sensitivities come back  - MRSA swab  - PT/OT    # Asbestos chronic lung disease (not requiring home O2)  - Will order budesonide inhaler  - Will order High Frequency Chest Wall Oscillation vest    # Asthma  - Will order arformoterol or equivalent     # HTN  - Will order volsartan     # GERD  - Will order protonix    # DVT prophylaxis  - Will order Lovenox 40mg SQ daily This is a 76 year old male w/ PMH of asbestos lung disease (not on home O2 but uses vibratory respiratory vest twice a day), HTN, GERD, and asthma who presents w/ right LE pain and redness s/p mechanical fall (tripped on carpet) on Monday denies LOC and head strike. Pt. rates pain at 9/10 prior to tylenol, 1/10 post tylenol. Pt. endorses erythema and swelling. Pt. denies CP, palps, dizziness, and numbness/tingling. In ED pt. found to be leukocytotic Pt. being admitted for treatment of RLE cellulitis.    # RLE cellulitis  - Evidenced by PE findings, leukocytosis w/ bandemia  - Right foot x-ray negative for acute fx  - RLE duplex negative for DVT  - 2L NS given  - Pain management w/ tylenol for now, as pt. said it was very effective  - Blood cultures sent  - Will order unasyn  - MRSA swab  - PT/OT    # Asbestos chronic lung disease (not requiring home O2)  - Will order budesonide inhaler  - Will order High Frequency Chest Wall Oscillation vest    # Asthma  - Will order arformoterol or equivalent     # HTN  - Will order volsartan     # GERD  - Will order protonix    # DVT prophylaxis  - Will order Lovenox 40mg SQ daily This is a 76 year old male w/ PMH of asbestos lung disease (not on home O2 but uses vibratory respiratory vest twice a day), HTN, GERD, and asthma who presents w/ right LE pain and redness s/p mechanical fall (tripped on carpet) on Monday denies LOC and head strike. Pt. rates pain at 9/10 prior to tylenol, 1/10 post tylenol. Pt. endorses erythema and swelling. Pt. denies CP, palps, dizziness, and numbness/tingling. In ED pt. found to be leukocytotic Pt. being admitted for treatment of RLE cellulitis.    # RLE cellulitis  - Evidenced by PE findings, leukocytosis w/ bandemia  - Right foot x-ray negative for acute fx  - RLE duplex negative for DVT  - 2L NS given  - Pain management w/ tylenol for now, as pt. said it was very effective  - Blood cultures sent  - Will order unasyn  - MRSA swab  - PT/OT    # Asbestos chronic lung disease (not requiring home O2), asthma  - Albuterol, budesonide    # HTN  Chronic  Continue Valsartan  Vitals per routine  Dash Diet    # GERD  - Protonix    # DVT prophylaxis  - Lovenox 40mg SQ daily

## 2022-03-31 NOTE — CONSULT NOTE ADULT - ASSESSMENT
76 year old male w/ PMH of asbestos lung disease (not on home O2 but uses vibratory respiratory vest twice a day), HTN, GERD, and asthma who presents w/ right LE pain and redness s/p mechanical fall (tripped on carpet)    asbestos exp - occupational exposure -   cough variant asthma - follows with Pulm - Dr Ventura - in [place]  STSI  Fall  HTN  GERD      ID eval  ABX  wound monitoring - skin monitoring  monitor WBC and biomarkers  chronic lung disease - asbestos related lung disease - asthma -   uses Bronchodilators (pt prefers nebulized albuterol vs inhaled) - and Inhaled Steroids -   seasonal vaccination  chest PT  will check VBG  spoke with daughter  reviewed outpatient records - follows with Dr Jacome - Medicine

## 2022-03-31 NOTE — H&P ADULT - ATTENDING COMMENTS
76 year old male w/ PMH of asbestos lung disease (not on home O2 but uses vibratory respiratory vest twice a day), HTN, GERD, and asthma who presents w/ right LE pain and redness s/p mechanical fall (tripped on carpet) on Monday denies LOC and head strike.     A/P  Cellulitis  US LLE neg for DVT   ID input appreciated  Continue Unasyn   Follow blood cultures    Asbestos/asthma  Continue inhalers  Pulm consulted     DVT ppx  Lovenox

## 2022-03-31 NOTE — ED ADULT TRIAGE NOTE - CHIEF COMPLAINT QUOTE
" I fell this past Monday, I tripped and fell down I was carrying a heavy box. I went to n urgent care yesterday > Xray was negative for broken bone . They suggested to come to ER . I still have the pain and swelling of my right foot and ankle "

## 2022-03-31 NOTE — ED PROVIDER NOTE - NSICDXPASTMEDICALHX_GEN_ALL_CORE_FT
PAST MEDICAL HISTORY:  Asthma     COPD (chronic obstructive pulmonary disease)     Osteoarthritis     Seasonal allergies

## 2022-03-31 NOTE — H&P ADULT - NSHPREVIEWOFSYSTEMS_GEN_ALL_CORE
CONSTITUTIONAL: No fever, weight loss, or fatigue  NECK: No pain or stiffness  RESPIRATORY: No cough, wheezing, no shortness of breath  CARDIOVASCULAR: No chest pain, palpitations, dizziness, or leg swelling  GASTROINTESTINAL: No abdominal or epigastric pain. No nausea, vomiting.  GENITOURINARY: No dysuria, frequency, hematuria, or incontinence  NEUROLOGICAL: No headaches, memory loss, loss of strength, numbness, or tremors  MUSCULOSKELETAL: RLE pain, now resolved w/ tylenol  PSYCHIATRIC: No depression, anxiety, mood swings

## 2022-03-31 NOTE — PATIENT PROFILE ADULT - FALL HARM RISK - HARM RISK INTERVENTIONS

## 2022-03-31 NOTE — ED PROVIDER NOTE - NSICDXFAMILYHX_GEN_ALL_CORE_FT
FAMILY HISTORY:  Sibling  Still living? Yes, Estimated age: 61-70  Family history of cervical cancer, Age at diagnosis: Age Unknown  Family history of coronary artery disease, Age at diagnosis: Age Unknown  Family history of diabetes mellitus type II, Age at diagnosis: Age Unknown  Family history of lung cancer, Age at diagnosis: Age Unknown

## 2022-03-31 NOTE — ED PROVIDER NOTE - PHYSICAL EXAMINATION
SPINE: c-spine: supple, no spinal tenderness. no midline tenderness. no paraspinal tenderness. no body step off. no deformity. no muscle spasm. FROM neg nexus   Thoracic spine: no spinal tenderness. no midline tenderness. no paraspinal tenderness. no body step off. no deformity. no muscle spasm.FROM   LS-spine:no spinal tenderness. no midline tenderness. no paraspinal tenderness. no body step off. no deformity.no muscle spasm. FROM neg nexus from x 4. SENSATION GROSSLY INTACT X4.   ms right le: right hip/knee tib/fib nontender. foot with swelling radiating up calf with erythema dorsum of foot medical aspect of foot ttp. digits nontender nvi

## 2022-03-31 NOTE — H&P ADULT - NSHPPHYSICALEXAM_GEN_ALL_CORE
PHYSICAL EXAM:  Vital Signs Last 24 Hrs  T(C): 37.1 (31 Mar 2022 10:12), Max: 37.1 (31 Mar 2022 10:12)  T(F): 98.8 (31 Mar 2022 10:12), Max: 98.8 (31 Mar 2022 10:12)  HR: 84 (31 Mar 2022 10:12) (84 - 84)  BP: 170/75 (31 Mar 2022 10:12) (170/75 - 170/75)  BP(mean): --  RR: 16 (31 Mar 2022 10:12) (16 - 16)  SpO2: 98% (31 Mar 2022 10:12) (98% - 98%)    GENERAL: NAD, well-groomed, well-developed  HEAD:  Atraumatic, Normocephalic  EYES: PERRLA, conjunctiva and sclera clear  ENMT: Moist mucous membranes, no lesions  NECK: Supple, No JVD,  NERVOUS SYSTEM:  A+Ox3, good concentration  CHEST/LUNG: diminished B/L breath sounds  HEART: RRR, + S1/S2  ABDOMEN: Soft, nontender, nondistended, bowel sounds present  EXTREMITIES:  2+ peripheral pulses, right foot +3 edema  SKIN: Right foot erythematous and hot PHYSICAL EXAM:  Vital Signs Last 24 Hrs  T(C): 37.1 (31 Mar 2022 10:12), Max: 37.1 (31 Mar 2022 10:12)  T(F): 98.8 (31 Mar 2022 10:12), Max: 98.8 (31 Mar 2022 10:12)  HR: 84 (31 Mar 2022 10:12) (84 - 84)  BP: 170/75 (31 Mar 2022 10:12) (170/75 - 170/75)  BP(mean): --  RR: 16 (31 Mar 2022 10:12) (16 - 16)  SpO2: 98% (31 Mar 2022 10:12) (98% - 98%)    GENERAL: NAD, well-groomed, well-developed  HEAD:  Atraumatic, Normocephalic  EYES: PERRLA, conjunctiva and sclera clear  ENMT: Moist mucous membranes, no lesions  NERVOUS SYSTEM:  A+Ox3, good concentration  CHEST/LUNG: diminished B/L breath sounds at bases  HEART: RRR, + S1/S2  ABDOMEN: Soft, nontender, nondistended, bowel sounds present  EXTREMITIES:  2+ peripheral pulses  SKIN: Right foot erythematous and warm

## 2022-03-31 NOTE — H&P ADULT - NSICDXPASTMEDICALHX_GEN_ALL_CORE_FT
PAST MEDICAL HISTORY:  Asthma     COPD (chronic obstructive pulmonary disease)     Environmental lung disease     GERD (gastroesophageal reflux disease)     HTN (hypertension)

## 2022-03-31 NOTE — PATIENT PROFILE ADULT - DO YOU FEEL UNSAFE AT HOME, WORK, OR SCHOOL?
She has a uti. Suspect that could be the cause of her pain, but would recommend finishing course of antibiotics    Will call when urine cx back   no

## 2022-03-31 NOTE — H&P ADULT - HISTORY OF PRESENT ILLNESS
This is a 76 year old male w/ PMH of asbestos lung disease (not on home O2 but uses vibratory respiratory vest twice a day), HTN, GERD, and asthma who presents w/ right LE pain and redness s/p mechanical fall (tripped on carpet) on Monday denies LOC and head strike. Pt. rates pain at 9/10 prior to tylenol, 1/10 post tylenol. Pt. endorses erythema and swelling. Pt. denies CP, palps, dizziness, and numbness/tingling. In ED pt. found to be leukocytotic Pt. being admitted for treatment of RLE cellulitis.    ED Course:  - Labs sent: CBC, CMP, Coags, lactate, SED, COVID  - UA and blood cultures x2 sent  - Imaging: CXR, RLE duplex, right foot x-ray  - EKG w/ no acute ischemic changes  - 2L NS IV, tylenol 650mg, zosyn and vanco given  This is a 76 year old male w/ PMH of asbestos lung disease (not on home O2 but uses vibratory respiratory vest twice a day), HTN, GERD, and asthma who presents w/ right LE pain and redness s/p mechanical fall (tripped on carpet) on Monday denies LOC and head strike. Pt. rates pain at 9/10 prior to tylenol, 1/10 post tylenol. Pt. endorses erythema and swelling. Pt. denies CP, palps, dizziness, and numbness/tingling. In ED pt. found to be leukocytotic Pt. being admitted for treatment of RLE cellulitis.    ED Course:  - Labs sent: CBC, CMP, Coags, lactate, SED rate, COVID  - UA and blood cultures x2 sent  - Imaging: CXR, RLE duplex, right foot x-ray  - EKG w/ no acute ischemic changes  - 2L NS IV, tylenol 650mg, zosyn and vanco given  This is a 76 year old male w/ PMH of asbestos lung disease (not on home O2 but uses vibratory respiratory vest twice a day), HTN, GERD, and asthma who presents w/ right LE pain and redness s/p mechanical fall (tripped on carpet) on Monday denies LOC and head strike. Pt. rates pain at 9/10 prior to tylenol, 1/10 post tylenol. Pt. endorses erythema and swelling. Pt. denies CP, palps, dizziness, and numbness/tingling. Pt. being admitted for treatment of RLE cellulitis.    ED Course:  - Labs sent: CBC, CMP, Coags, lactate, SED rate, COVID  - UA and blood cultures x2 sent  - Imaging: CXR, RLE duplex, right foot x-ray  - EKG w/ no acute ischemic changes  - 2L NS IV, tylenol 650mg, zosyn and vanco given

## 2022-03-31 NOTE — CONSULT NOTE ADULT - SUBJECTIVE AND OBJECTIVE BOX
Date/Time Patient Seen:  		  Referring MD:   Data Reviewed	       Patient is a 76y old  Male who presents with a chief complaint of right LE cellulitis (31 Mar 2022 12:51)      Subjective/HPI   This is a 76 year old male w/ PMH of asbestos lung disease (not on home O2 but uses vibratory respiratory vest twice a day), HTN, GERD, and asthma who presents w/ right LE pain and redness s/p mechanical fall (tripped on carpet) on Monday denies LOC and head strike. Pt. rates pain at 9/10 prior to tylenol, 1/10 post tylenol. Pt. endorses erythema and swelling. Pt. denies CP, palps, dizziness, and numbness/tingling. Pt. being admitted for treatment of RLE cellulitis.    ED Course:  - Labs sent: CBC, CMP, Coags, lactate, SED rate, COVID  - UA and blood cultures x2 sent  - Imaging: CXR, RLE duplex, right foot x-ray  - EKG w/ no acute ischemic changes  - 2L NS IV, tylenol 650mg, zosyn and vanco given   PAST MEDICAL & SURGICAL HISTORY:  Asthma    Osteoarthritis    Seasonal allergies    COPD (chronic obstructive pulmonary disease)    Environmental lung disease    HTN (hypertension)    GERD (gastroesophageal reflux disease)    S/P hernia repair    S/P colonoscopy    PAST SURGICAL HISTORY:  S/P colonoscopy     S/P hernia repair.     FAMILY HISTORY:  Sibling  Still living? Yes, Estimated age: 61-70  Family history of cervical cancer, Age at diagnosis: Age Unknown  Family history of coronary artery disease, Age at diagnosis: Age Unknown  Family history of diabetes mellitus type II, Age at diagnosis: Age Unknown  Family history of lung cancer, Age at diagnosis: Age Unknown.     Social History:  Social History (marital status, living situation, occupation, tobacco use, alcohol and drug use, and sexual history): Social Hx:   Denies current alcohol, drug or recreational substance use       Tobacco Screening:  · Core Measure Site	No    Risk Assessment:    Present on Admission:  Deep Venous Thrombosis	suspected  Pulmonary Embolus	no     Heart Failure:  Does this patient have a history of or has been diagnosed with heart failure? no.      Medication list         MEDICATIONS  (STANDING):  buDESOnide    Inhalation Suspension 0.5 milliGRAM(s) Inhalation daily  ceFAZolin   IVPB 2000 milliGRAM(s) IV Intermittent every 8 hours  enoxaparin Injectable 40 milliGRAM(s) SubCutaneous every 24 hours  valsartan 40 milliGRAM(s) Oral daily    MEDICATIONS  (PRN):  acetaminophen     Tablet .. 650 milliGRAM(s) Oral every 6 hours PRN Temp greater or equal to 38C (100.4F), Mild Pain (1 - 3), Moderate Pain (4 - 6)  ALBUTerol    90 MICROgram(s) HFA Inhaler 2 Puff(s) Inhalation every 6 hours PRN Shortness of Breath  melatonin 3 milliGRAM(s) Oral at bedtime PRN Insomnia         Vitals log        ICU Vital Signs Last 24 Hrs  T(C): 37.1 (31 Mar 2022 16:18), Max: 37.1 (31 Mar 2022 10:12)  T(F): 98.8 (31 Mar 2022 16:18), Max: 98.8 (31 Mar 2022 10:12)  HR: 69 (31 Mar 2022 16:18) (69 - 84)  BP: 160/68 (31 Mar 2022 16:18) (124/69 - 170/75)  BP(mean): --  ABP: --  ABP(mean): --  RR: 18 (31 Mar 2022 16:18) (16 - 19)  SpO2: 98% (31 Mar 2022 16:18) (96% - 98%)           Input and Output:  I&O's Detail      Lab Data                        14.7   10.77 )-----------( 218      ( 31 Mar 2022 10:50 )             43.0     03-31    136  |  102  |  18  ----------------------------<  154<H>  3.8   |  24  |  1.13    Ca    8.2<L>      31 Mar 2022 10:50    TPro  7.2  /  Alb  3.6  /  TBili  0.8  /  DBili  x   /  AST  22  /  ALT  19  /  AlkPhos  94  03-31            Review of Systems	  leg cellulitis    Objective     Physical Examination    heart s1s2  lung dec BS  abd soft  head nc      Pertinent Lab findings & Imaging      Hill:  NO   Adequate UO     I&O's Detail           Discussed with:     Cultures:	        Radiology      ACC: [id number] EXAM:  XR CHEST PA LAT 2V                          PROCEDURE DATE:  03/31/2022          INTERPRETATION:  AP and lateral chest on March 31, 2022 at 10:58 AM. 2   lateral images. Patient has sepsis.    Heart size is within normal limits.    The lung fields and pleural surfaces are unremarkable.    Chest is similar to CAT scan of July 13, 2021.    IMPRESSION: Negative chest.    --- End of Report ---            MAHAD HANSON MD; Attending Radiologist  This document has been electronically signed. Mar 31 2022 11:44AM                ACC: [id number] EXAM:  US DPLX LWR EXT VEINS LTD RT                          PROCEDURE DATE:  03/31/2022          INTERPRETATION:  CLINICAL INFORMATION: Right leg swelling and pain    TECHNIQUE: Duplex sonography of the RIGHT LOWER extremity veins with   color and spectral Doppler, with and without compression.    FINDINGS:    There is normal compressibility of the right common femoral, femoral and   popliteal veins.  The contralateral common femoral vein is patent.  Doppler examination shows normal spontaneous and phasic flow.    No calf vein thrombosis is detected.    IMPRESSION:  No evidence of right lower extremity deep venous thrombosis.          --- End of Report ---            RAJEEV GARRETT MD; Attending Radiologist  This document has been electronically signed. Mar 31 2022 11:37AM          
  HPI:  77YO M PMH of asbestos lung disease (not on home O2 but uses vibratory respiratory vest twice a day), HTN, GERD, and asthma who presented with cc of  right foot/LE pain swelling and redness s/p mechanical fall (tripped on carpet) on Monday. No fracture. Denies fever chills n/v/d CP SOB.     Infectious Disease consult was called to evaluate pt and for antibiotic management.      Past Medical & Surgical Hx:  PAST MEDICAL & SURGICAL HISTORY:  Asthma  COPD (chronic obstructive pulmonary disease)  Environmental lung disease  HTN (hypertension)  GERD (gastroesophageal reflux disease)  S/P hernia repair  S/P colonoscopy      Social History--  EtOH: denies  Tobacco: denies   Drug Use: denies    FAMILY HISTORY:  Family history of lung cancer (Sibling)    Family history of cervical cancer (Sibling)    Family history of coronary artery disease (Sibling)    Family history of diabetes mellitus type II (Sibling)      Allergies  No Known Allergies    Intolerances  NONE    Home Medications:  Advair Diskus 250 mcg-50 mcg inhalation powder: 1 puff(s) inhaled 2 times a day (31 Mar 2022 13:27)  albuterol CFC free 90 mcg/inh inhalation aerosol: 2 puff(s) inhaled every 4 hours, As needed, Shortness of Breath (31 Mar 2022 13:27)  arformoterol 15 mcg/2 mL inhalation solution:  (31 Mar 2022 13:27)  budesonide 0.5 mg/2 mL inhalation suspension:  (31 Mar 2022 13:27)  Protonix 40 mg oral delayed release tablet: 1 tab(s) orally once a day (31 Mar 2022 13:27)  valsartan 40 mg oral tablet: 1 tab(s) orally once a day (31 Mar 2022 13:27)      Current Inpatient Medications :    ANTIBIOTICS:       OTHER RELEVANT MEDICATIONS :  acetaminophen     Tablet .. 650 milliGRAM(s) Oral every 6 hours PRN  enoxaparin Injectable 40 milliGRAM(s) SubCutaneous every 24 hours      ROS:  Unable to obtain due to :     ROS:  CONSTITUTIONAL:  Negative fever or chills, feels well, good appetite  EYES:  Negative  blurry vision or double vision  CARDIOVASCULAR:  Negative for chest pain or palpitations  RESPIRATORY:  Negative for cough, wheezing, or SOB   GASTROINTESTINAL:  Negative for nausea, vomiting, diarrhea, constipation, or abdominal pain  GENITOURINARY:  Negative frequency, urgency , dysuria or hematuria   NEUROLOGIC:  No headache, confusion, dizziness, lightheadedness  All other systems were reviewed and are negative          I&O's Detail      Physical Exam:  Vital Signs Last 24 Hrs  T(C): 36.8 (31 Mar 2022 13:55), Max: 37.1 (31 Mar 2022 10:12)  T(F): 98.2 (31 Mar 2022 13:55), Max: 98.8 (31 Mar 2022 10:12)  HR: 71 (31 Mar 2022 13:55) (71 - 84)  BP: 124/69 (31 Mar 2022 13:55) (124/69 - 170/75)  BP(mean): --  RR: 19 (31 Mar 2022 13:55) (16 - 19)  SpO2: 96% (31 Mar 2022 13:55) (96% - 98%)  Height (cm): 170.2 (03-31 @ 10:12)  Weight (kg): 77.1 (03-31 @ 10:12)  BMI (kg/m2): 26.6 (03-31 @ 10:12)  BSA (m2): 1.89 (03-31 @ 10:12)    General: well developed well nourished, in no acute distress  Neck: supple, trachea midline  Lungs: clear, no wheeze/rhonchi  Cardiovascular: regular rate and rhythm, S1 S2  Abdomen: soft, nontender, ND, bowel sounds normal  Neurological:  alert and oriented x3  Skin: no rash  Extremities: Right foot/leg redness swelling +warm to touch and very tender      Labs:                        14.7   10.77 )-----------( 218      ( 31 Mar 2022 10:50 )             43.0   03-31    136  |  102  |  18  ----------------------------<  154<H>  3.8   |  24  |  1.13    Ca    8.2<L>      31 Mar 2022 10:50    TPro  7.2  /  Alb  3.6  /  TBili  0.8  /  DBili  x   /  AST  22  /  ALT  19  /  AlkPhos  94  03-31    RECENT CULTURES:          RADIOLOGY & ADDITIONAL STUDIES:    ACC: [id number] EXAM:  US DPLX LWR EXT VEINS LTD RT                          PROCEDURE DATE:  03/31/2022          INTERPRETATION:  CLINICAL INFORMATION: Right leg swelling and pain    TECHNIQUE: Duplex sonography of the RIGHT LOWER extremity veins with   color and spectral Doppler, with and without compression.    FINDINGS:    There is normal compressibility of the right common femoral, femoral and   popliteal veins.  The contralateral common femoral vein is patent.  Doppler examination shows normal spontaneous and phasic flow.    No calf vein thrombosis is detected.    IMPRESSION:  No evidence of right lower extremity deep venous thrombosis.    ACC: [id number] EXAM:  XR FOOT COMP MIN 3 VIEWS RT                          PROCEDURE DATE:  03/31/2022          INTERPRETATION:  Right foot. Patient has local infection after trauma. 3   views.    Moderate hallux valgus with degeneration again noted.    Inferior calcaneal spur again seen.    No bone destruction or fracture.    Present film raises question of increased tissue reaction peripheral to   the proximal phalanx of the fifth toe.    IMPRESSION: Question increased swelling around the outer fifth toe. This   is compared to one day prior.    Assessment :   77YO M PMH of asbestos lung disease (not on home O2 but uses vibratory respiratory vest twice a day), HTN, GERD, and asthma admitted with right foot/LE pain cellulitis with lymphangitis sp trauma. No fracture noted.  Looks like Strep  Afebrile  WBC 10.7K    Plan :   Start Ancef  Fu cultures  Trend temps and cbc  Elevate leg    D/w Dr Maynard    Continue with present regiment .  Approptiate use of antibiotics and adverse effects reviewed.      I have discussed the above plan of care with patient/wife in detail. They expressed understanding of the treatment plan . Risks, benefits and alternatives discussed in detail. I have asked if they have any questions or concerns and appropriately addressed them to the best of my ability .      > 45 minutes spent in direct patient care reviewing  the notes, lab data/ imaging , discussion with multidisciplinary team. All questions were addressed and answered to the best of my capacity .    Thank you for allowing me to participate in the care of your patient .      Michael Fernandez MD  Infectious Disease  [phone]

## 2022-03-31 NOTE — ED PROVIDER NOTE - OBJECTIVE STATEMENT
pcp: sapphire amezquitam: angelito pt is a 75yo male with pmhx of "lung issues" and htn presents with foot pain. pt reports right foot pain s/p fall on 3/28. [place] [facility] [place] without acute findings. pt reports 2 days ago foot turned red so he came to ed for eval.pt denies fever, cp, sob, numbness, head injury.   pcp: sapphire  pulm: angelito

## 2022-04-01 ENCOUNTER — TRANSCRIPTION ENCOUNTER (OUTPATIENT)
Age: 77
End: 2022-04-01

## 2022-04-01 LAB
ALBUMIN SERPL ELPH-MCNC: 3.1 G/DL — LOW (ref 3.3–5)
ALP SERPL-CCNC: 82 U/L — SIGNIFICANT CHANGE UP (ref 30–120)
ALT FLD-CCNC: 16 U/L DA — SIGNIFICANT CHANGE UP (ref 10–60)
ANION GAP SERPL CALC-SCNC: 6 MMOL/L — SIGNIFICANT CHANGE UP (ref 5–17)
ANION GAP SERPL CALC-SCNC: 7 MMOL/L — SIGNIFICANT CHANGE UP (ref 5–17)
AST SERPL-CCNC: 19 U/L — SIGNIFICANT CHANGE UP (ref 10–40)
BASE EXCESS BLDV CALC-SCNC: 1.7 MMOL/L — SIGNIFICANT CHANGE UP (ref -2–3)
BASOPHILS # BLD AUTO: 0.02 K/UL — SIGNIFICANT CHANGE UP (ref 0–0.2)
BASOPHILS NFR BLD AUTO: 0.2 % — SIGNIFICANT CHANGE UP (ref 0–2)
BILIRUB SERPL-MCNC: 0.6 MG/DL — SIGNIFICANT CHANGE UP (ref 0.2–1.2)
BUN SERPL-MCNC: 16 MG/DL — SIGNIFICANT CHANGE UP (ref 7–23)
BUN SERPL-MCNC: 16 MG/DL — SIGNIFICANT CHANGE UP (ref 7–23)
CALCIUM SERPL-MCNC: 8.2 MG/DL — LOW (ref 8.4–10.5)
CALCIUM SERPL-MCNC: 8.3 MG/DL — LOW (ref 8.4–10.5)
CHLORIDE SERPL-SCNC: 105 MMOL/L — SIGNIFICANT CHANGE UP (ref 96–108)
CHLORIDE SERPL-SCNC: 106 MMOL/L — SIGNIFICANT CHANGE UP (ref 96–108)
CO2 SERPL-SCNC: 26 MMOL/L — SIGNIFICANT CHANGE UP (ref 22–31)
CO2 SERPL-SCNC: 26 MMOL/L — SIGNIFICANT CHANGE UP (ref 22–31)
CREAT SERPL-MCNC: 1.1 MG/DL — SIGNIFICANT CHANGE UP (ref 0.5–1.3)
CREAT SERPL-MCNC: 1.2 MG/DL — SIGNIFICANT CHANGE UP (ref 0.5–1.3)
EGFR: 62 ML/MIN/1.73M2 — SIGNIFICANT CHANGE UP
EGFR: 69 ML/MIN/1.73M2 — SIGNIFICANT CHANGE UP
EOSINOPHIL # BLD AUTO: 0.13 K/UL — SIGNIFICANT CHANGE UP (ref 0–0.5)
EOSINOPHIL NFR BLD AUTO: 1.4 % — SIGNIFICANT CHANGE UP (ref 0–6)
ERYTHROCYTE [SEDIMENTATION RATE] IN BLOOD: 20 MM/HR — HIGH (ref 0–9)
GLUCOSE SERPL-MCNC: 111 MG/DL — HIGH (ref 70–99)
GLUCOSE SERPL-MCNC: 121 MG/DL — HIGH (ref 70–99)
HCO3 BLDV-SCNC: 26 MMOL/L — SIGNIFICANT CHANGE UP (ref 22–29)
HCT VFR BLD CALC: 42.9 % — SIGNIFICANT CHANGE UP (ref 39–50)
HCT VFR BLD CALC: 43.8 % — SIGNIFICANT CHANGE UP (ref 39–50)
HGB BLD-MCNC: 14.2 G/DL — SIGNIFICANT CHANGE UP (ref 13–17)
HGB BLD-MCNC: 14.3 G/DL — SIGNIFICANT CHANGE UP (ref 13–17)
HOROWITZ INDEX BLDV+IHG-RTO: 21 — SIGNIFICANT CHANGE UP
IMM GRANULOCYTES NFR BLD AUTO: 0.5 % — SIGNIFICANT CHANGE UP (ref 0–1.5)
LYMPHOCYTES # BLD AUTO: 1.35 K/UL — SIGNIFICANT CHANGE UP (ref 1–3.3)
LYMPHOCYTES # BLD AUTO: 14.7 % — SIGNIFICANT CHANGE UP (ref 13–44)
MCHC RBC-ENTMCNC: 29.2 PG — SIGNIFICANT CHANGE UP (ref 27–34)
MCHC RBC-ENTMCNC: 29.5 PG — SIGNIFICANT CHANGE UP (ref 27–34)
MCHC RBC-ENTMCNC: 32.4 GM/DL — SIGNIFICANT CHANGE UP (ref 32–36)
MCHC RBC-ENTMCNC: 33.3 GM/DL — SIGNIFICANT CHANGE UP (ref 32–36)
MCV RBC AUTO: 88.5 FL — SIGNIFICANT CHANGE UP (ref 80–100)
MCV RBC AUTO: 90.1 FL — SIGNIFICANT CHANGE UP (ref 80–100)
MONOCYTES # BLD AUTO: 0.97 K/UL — HIGH (ref 0–0.9)
MONOCYTES NFR BLD AUTO: 10.6 % — SIGNIFICANT CHANGE UP (ref 2–14)
MRSA PCR RESULT.: SIGNIFICANT CHANGE UP
NEUTROPHILS # BLD AUTO: 6.66 K/UL — SIGNIFICANT CHANGE UP (ref 1.8–7.4)
NEUTROPHILS NFR BLD AUTO: 72.6 % — SIGNIFICANT CHANGE UP (ref 43–77)
NRBC # BLD: 0 /100 WBCS — SIGNIFICANT CHANGE UP (ref 0–0)
NRBC # BLD: 0 /100 WBCS — SIGNIFICANT CHANGE UP (ref 0–0)
PCO2 BLDV: 37 MMHG — LOW (ref 42–55)
PH BLDV: 7.45 — HIGH (ref 7.32–7.43)
PLATELET # BLD AUTO: 231 K/UL — SIGNIFICANT CHANGE UP (ref 150–400)
PLATELET # BLD AUTO: 260 K/UL — SIGNIFICANT CHANGE UP (ref 150–400)
PO2 BLDV: 155 MMHG — HIGH (ref 25–45)
POTASSIUM SERPL-MCNC: 3.8 MMOL/L — SIGNIFICANT CHANGE UP (ref 3.5–5.3)
POTASSIUM SERPL-MCNC: 4.5 MMOL/L — SIGNIFICANT CHANGE UP (ref 3.5–5.3)
POTASSIUM SERPL-SCNC: 3.8 MMOL/L — SIGNIFICANT CHANGE UP (ref 3.5–5.3)
POTASSIUM SERPL-SCNC: 4.5 MMOL/L — SIGNIFICANT CHANGE UP (ref 3.5–5.3)
PROT SERPL-MCNC: 6.8 G/DL — SIGNIFICANT CHANGE UP (ref 6–8.3)
RBC # BLD: 4.85 M/UL — SIGNIFICANT CHANGE UP (ref 4.2–5.8)
RBC # BLD: 4.86 M/UL — SIGNIFICANT CHANGE UP (ref 4.2–5.8)
RBC # FLD: 13.1 % — SIGNIFICANT CHANGE UP (ref 10.3–14.5)
RBC # FLD: 13.2 % — SIGNIFICANT CHANGE UP (ref 10.3–14.5)
S AUREUS DNA NOSE QL NAA+PROBE: SIGNIFICANT CHANGE UP
SAO2 % BLDV: 98.7 % — HIGH (ref 67–88)
SODIUM SERPL-SCNC: 137 MMOL/L — SIGNIFICANT CHANGE UP (ref 135–145)
SODIUM SERPL-SCNC: 139 MMOL/L — SIGNIFICANT CHANGE UP (ref 135–145)
WBC # BLD: 9.1 K/UL — SIGNIFICANT CHANGE UP (ref 3.8–10.5)
WBC # BLD: 9.18 K/UL — SIGNIFICANT CHANGE UP (ref 3.8–10.5)
WBC # FLD AUTO: 9.1 K/UL — SIGNIFICANT CHANGE UP (ref 3.8–10.5)
WBC # FLD AUTO: 9.18 K/UL — SIGNIFICANT CHANGE UP (ref 3.8–10.5)

## 2022-04-01 PROCEDURE — 99233 SBSQ HOSP IP/OBS HIGH 50: CPT

## 2022-04-01 RX ADMIN — Medication 650 MILLIGRAM(S): at 09:16

## 2022-04-01 RX ADMIN — ENOXAPARIN SODIUM 40 MILLIGRAM(S): 100 INJECTION SUBCUTANEOUS at 18:46

## 2022-04-01 RX ADMIN — Medication 100 MILLIGRAM(S): at 06:16

## 2022-04-01 RX ADMIN — Medication 100 MILLIGRAM(S): at 13:26

## 2022-04-01 RX ADMIN — ALBUTEROL 2.5 MILLIGRAM(S): 90 AEROSOL, METERED ORAL at 07:31

## 2022-04-01 RX ADMIN — Medication 0.5 MILLIGRAM(S): at 07:32

## 2022-04-01 RX ADMIN — Medication 650 MILLIGRAM(S): at 08:22

## 2022-04-01 RX ADMIN — PANTOPRAZOLE SODIUM 40 MILLIGRAM(S): 20 TABLET, DELAYED RELEASE ORAL at 06:16

## 2022-04-01 RX ADMIN — VALSARTAN 40 MILLIGRAM(S): 80 TABLET ORAL at 06:16

## 2022-04-01 RX ADMIN — ALBUTEROL 2.5 MILLIGRAM(S): 90 AEROSOL, METERED ORAL at 20:09

## 2022-04-01 RX ADMIN — Medication 100 MILLIGRAM(S): at 21:41

## 2022-04-01 NOTE — PHYSICAL THERAPY INITIAL EVALUATION ADULT - ADDITIONAL COMMENTS
Pt lived in private home with 4 steps to enter with rail, 10 steps to bedroom with rail, does not own DME however has RW and cane from other family members. Pt denies previous falls, states he fell due to carrying a heavy object. Pt is independent with ADL's and was driving prior to fall.

## 2022-04-01 NOTE — DISCHARGE NOTE NURSING/CASE MANAGEMENT/SOCIAL WORK - PATIENT PORTAL LINK FT
You can access the FollowMyHealth Patient Portal offered by [facility] by registering at the following website: [email or web address]. By joining [employer]’s FollowMyHealth portal, you will also be able to view your health information using other applications (apps) compatible with our system.

## 2022-04-01 NOTE — DISCHARGE NOTE NURSING/CASE MANAGEMENT/SOCIAL WORK - NSDCPEFALRISK_GEN_ALL_CORE
For information on Fall & Injury Prevention, visit: [email or web address] OR  [email or web address] OR  https://www.cdc.gov/steadi/patient.html

## 2022-04-01 NOTE — PHYSICAL THERAPY INITIAL EVALUATION ADULT - PERTINENT HX OF CURRENT PROBLEM, REHAB EVAL
Pt is a 76 year old male w/ PMH of asbestos lung disease (not on home O2 but uses vibratory respiratory vest twice a day), HTN, GERD, and asthma who presents w/ right LE pain and redness s/p mechanical fall (tripped on carpet) on Monday denies LOC and head strike.  R foot x-ray 3/31: increased swelling 5th toe, Right LE neg for DVT

## 2022-04-02 ENCOUNTER — TRANSCRIPTION ENCOUNTER (OUTPATIENT)
Age: 77
End: 2022-04-02

## 2022-04-02 VITALS
OXYGEN SATURATION: 95 % | SYSTOLIC BLOOD PRESSURE: 152 MMHG | HEART RATE: 68 BPM | DIASTOLIC BLOOD PRESSURE: 68 MMHG | RESPIRATION RATE: 18 BRPM | TEMPERATURE: 98 F

## 2022-04-02 LAB
ANION GAP SERPL CALC-SCNC: 7 MMOL/L — SIGNIFICANT CHANGE UP (ref 5–17)
BUN SERPL-MCNC: 18 MG/DL — SIGNIFICANT CHANGE UP (ref 7–23)
CALCIUM SERPL-MCNC: 8.2 MG/DL — LOW (ref 8.4–10.5)
CHLORIDE SERPL-SCNC: 104 MMOL/L — SIGNIFICANT CHANGE UP (ref 96–108)
CO2 SERPL-SCNC: 27 MMOL/L — SIGNIFICANT CHANGE UP (ref 22–31)
CREAT SERPL-MCNC: 1.13 MG/DL — SIGNIFICANT CHANGE UP (ref 0.5–1.3)
EGFR: 67 ML/MIN/1.73M2 — SIGNIFICANT CHANGE UP
GLUCOSE SERPL-MCNC: 108 MG/DL — HIGH (ref 70–99)
HCT VFR BLD CALC: 42.1 % — SIGNIFICANT CHANGE UP (ref 39–50)
HGB BLD-MCNC: 14.1 G/DL — SIGNIFICANT CHANGE UP (ref 13–17)
MCHC RBC-ENTMCNC: 29.8 PG — SIGNIFICANT CHANGE UP (ref 27–34)
MCHC RBC-ENTMCNC: 33.5 GM/DL — SIGNIFICANT CHANGE UP (ref 32–36)
MCV RBC AUTO: 89 FL — SIGNIFICANT CHANGE UP (ref 80–100)
NRBC # BLD: 0 /100 WBCS — SIGNIFICANT CHANGE UP (ref 0–0)
PLATELET # BLD AUTO: 278 K/UL — SIGNIFICANT CHANGE UP (ref 150–400)
POTASSIUM SERPL-MCNC: 4 MMOL/L — SIGNIFICANT CHANGE UP (ref 3.5–5.3)
POTASSIUM SERPL-SCNC: 4 MMOL/L — SIGNIFICANT CHANGE UP (ref 3.5–5.3)
RBC # BLD: 4.73 M/UL — SIGNIFICANT CHANGE UP (ref 4.2–5.8)
RBC # FLD: 13.1 % — SIGNIFICANT CHANGE UP (ref 10.3–14.5)
SODIUM SERPL-SCNC: 138 MMOL/L — SIGNIFICANT CHANGE UP (ref 135–145)
WBC # BLD: 8.27 K/UL — SIGNIFICANT CHANGE UP (ref 3.8–10.5)
WBC # FLD AUTO: 8.27 K/UL — SIGNIFICANT CHANGE UP (ref 3.8–10.5)

## 2022-04-02 PROCEDURE — 87640 STAPH A DNA AMP PROBE: CPT

## 2022-04-02 PROCEDURE — 80053 COMPREHEN METABOLIC PANEL: CPT

## 2022-04-02 PROCEDURE — 94664 DEMO&/EVAL PT USE INHALER: CPT

## 2022-04-02 PROCEDURE — 99285 EMERGENCY DEPT VISIT HI MDM: CPT

## 2022-04-02 PROCEDURE — 73630 X-RAY EXAM OF FOOT: CPT

## 2022-04-02 PROCEDURE — 85730 THROMBOPLASTIN TIME PARTIAL: CPT

## 2022-04-02 PROCEDURE — 96367 TX/PROPH/DG ADDL SEQ IV INF: CPT

## 2022-04-02 PROCEDURE — 97161 PT EVAL LOW COMPLEX 20 MIN: CPT

## 2022-04-02 PROCEDURE — 82803 BLOOD GASES ANY COMBINATION: CPT

## 2022-04-02 PROCEDURE — 87040 BLOOD CULTURE FOR BACTERIA: CPT

## 2022-04-02 PROCEDURE — 96365 THER/PROPH/DIAG IV INF INIT: CPT

## 2022-04-02 PROCEDURE — 85025 COMPLETE CBC W/AUTO DIFF WBC: CPT

## 2022-04-02 PROCEDURE — 83605 ASSAY OF LACTIC ACID: CPT

## 2022-04-02 PROCEDURE — 99239 HOSP IP/OBS DSCHRG MGMT >30: CPT

## 2022-04-02 PROCEDURE — 80048 BASIC METABOLIC PNL TOTAL CA: CPT

## 2022-04-02 PROCEDURE — 71046 X-RAY EXAM CHEST 2 VIEWS: CPT

## 2022-04-02 PROCEDURE — 93005 ELECTROCARDIOGRAM TRACING: CPT

## 2022-04-02 PROCEDURE — 94640 AIRWAY INHALATION TREATMENT: CPT

## 2022-04-02 PROCEDURE — 85652 RBC SED RATE AUTOMATED: CPT

## 2022-04-02 PROCEDURE — 36415 COLL VENOUS BLD VENIPUNCTURE: CPT

## 2022-04-02 PROCEDURE — 81001 URINALYSIS AUTO W/SCOPE: CPT

## 2022-04-02 PROCEDURE — 85027 COMPLETE CBC AUTOMATED: CPT

## 2022-04-02 PROCEDURE — 85610 PROTHROMBIN TIME: CPT

## 2022-04-02 PROCEDURE — 87635 SARS-COV-2 COVID-19 AMP PRB: CPT

## 2022-04-02 PROCEDURE — 93971 EXTREMITY STUDY: CPT

## 2022-04-02 PROCEDURE — 87641 MR-STAPH DNA AMP PROBE: CPT

## 2022-04-02 PROCEDURE — 94760 N-INVAS EAR/PLS OXIMETRY 1: CPT

## 2022-04-02 RX ORDER — BUDESONIDE, MICRONIZED 100 %
0 POWDER (GRAM) MISCELLANEOUS
Qty: 0 | Refills: 0 | DISCHARGE

## 2022-04-02 RX ORDER — CEPHALEXIN 500 MG
1 CAPSULE ORAL
Qty: 20 | Refills: 0
Start: 2022-04-02 | End: 2022-04-06

## 2022-04-02 RX ADMIN — Medication 100 MILLIGRAM(S): at 13:40

## 2022-04-02 RX ADMIN — Medication 0.5 MILLIGRAM(S): at 06:33

## 2022-04-02 RX ADMIN — ALBUTEROL 2.5 MILLIGRAM(S): 90 AEROSOL, METERED ORAL at 06:33

## 2022-04-02 RX ADMIN — Medication 650 MILLIGRAM(S): at 12:05

## 2022-04-02 RX ADMIN — Medication 100 MILLIGRAM(S): at 05:21

## 2022-04-02 RX ADMIN — Medication 650 MILLIGRAM(S): at 12:35

## 2022-04-02 RX ADMIN — VALSARTAN 40 MILLIGRAM(S): 80 TABLET ORAL at 05:21

## 2022-04-02 RX ADMIN — PANTOPRAZOLE SODIUM 40 MILLIGRAM(S): 20 TABLET, DELAYED RELEASE ORAL at 05:21

## 2022-04-02 NOTE — DISCHARGE NOTE PROVIDER - NSDCFUADDINST_GEN_ALL_CORE_FT
- Please make an appointment for follow up with your primary doctor within 1-3 days of discharge  - It is important to see your primary physician as well as the physicians listed below to perform a comprehensive medical review.  Call their offices for an appointment as soon as you leave the hospital.  You will also need to see them for renewal of your medications.  If you do not have a primary physician, contact the [facility] Physician Referral Service at [phone].  To obtain your results, you can access the [facility] Patient Portal at [email or web address].   - Your medical issues appear to be stable at this time, but if your symptoms recur or worsen, contact your physicians and/or return to the hospital if necessary.    -If you encounter any issues or questions with your medication, call your physicians before stopping the medication.    - Limit your diet to 2 grams of sodium daily.  - Patient or caretaker is responsible for making all appointments

## 2022-04-02 NOTE — DISCHARGE NOTE PROVIDER - CARE PROVIDER_API CALL
Surendra Ventura  CRITICAL CARE MEDICINE  [address], [address]  [place] [address]  Phone: [phone]  Fax: [phone]  Follow Up Time:     Wolfgang Jacome)  Internal Medicine  [address] [place] [address]  Phone: [phone]  Fax: [phone]  Follow Up Time: 1 week

## 2022-04-02 NOTE — PROGRESS NOTE ADULT - SUBJECTIVE AND OBJECTIVE BOX
LINDA ORO is a 76yMale , patient examined and chart reviewed.     INTERVAL HPI/ OVERNIGHT EVENTS:   Afebrile. No events.  Still with right foot pain.    PAST MEDICAL & SURGICAL HISTORY:  Asthma  COPD (chronic obstructive pulmonary disease)  Environmental lung disease  HTN (hypertension)  GERD (gastroesophageal reflux disease)  S/P hernia repair  S/P colonoscopy      For details regarding the patient's social history, family history, and other miscellaneous elements, please refer the initial infectious diseases consultation and/or the admitting history and physical examination for this admission.      ROS:  CONSTITUTIONAL:  Negative fever or chills  EYES:  Negative  blurry vision or double vision  CARDIOVASCULAR:  Negative for chest pain or palpitations  RESPIRATORY:  Negative for cough, wheezing, or SOB   GASTROINTESTINAL:  Negative for nausea, vomiting, diarrhea, constipation, or abdominal pain  GENITOURINARY:  Negative frequency, urgency or dysuria  NEUROLOGIC:  No headache, confusion, dizziness, lightheadedness  All other systems were reviewed and are negative         Current inpatient medications :    ANTIBIOTICS/RELEVANT:  ceFAZolin   IVPB 2000 milliGRAM(s) IV Intermittent every 8 hours      acetaminophen     Tablet .. 650 milliGRAM(s) Oral every 6 hours PRN  ALBUTerol    0.083% 2.5 milliGRAM(s) Nebulizer every 12 hours  buDESOnide    Inhalation Suspension 0.5 milliGRAM(s) Inhalation daily  enoxaparin Injectable 40 milliGRAM(s) SubCutaneous every 24 hours  melatonin 3 milliGRAM(s) Oral at bedtime PRN  pantoprazole    Tablet 40 milliGRAM(s) Oral before breakfast  valsartan 40 milliGRAM(s) Oral daily      Objective:     @ 07:  -   @ 07:00  --------------------------------------------------------  IN: 190 mL / OUT: 875 mL / NET: -685 mL      T(C): 37.2 (22 @ 09:15), Max: 38.2 (22 @ 20:30)  HR: 81 (22 @ 09:15) (65 - 83)  BP: 146/60 (22 @ 09:15) (124/69 - 160/68)  RR: 18 (22 @ 09:15) (18 - 19)  SpO2: 95% (22 @ 09:15) (94% - 98%)    Physical Exam:  General: no acute distress  Neck: supple, trachea midline  Lungs: clear, no wheeze/rhonchi  Cardiovascular: regular rate and rhythm, S1 S2  Abdomen: soft, nontender,  bowel sounds normal  Neurological: alert and oriented x3  Skin: no rash  Extremities: Right foot/leg decreasing erythema swelling  +       LABS:                          14.2   9.18  )-----------( 231      ( 2022 07:30 )             43.8           139  |  106  |  16  ----------------------------<  111<H>  4.5   |  26  |  1.10    Ca    8.2<L>      2022 07:30    TPro  6.8  /  Alb  3.1<L>  /  TBili  0.6  /  DBili  x   /  AST  19  /  ALT  16  /  AlkPhos  82        PT/INR - ( 31 Mar 2022 10:50 )   PT: 13.3 sec;   INR: 1.15 ratio         PTT - ( 31 Mar 2022 10:50 )  PTT:30.3 sec  Urinalysis Basic - ( 31 Mar 2022 12:10 )    Color: Yellow / Appearance: Clear / S.010 / pH: x  Gluc: x / Ketone: Negative  / Bili: Negative / Urobili: Negative mg/dL   Blood: x / Protein: 15 mg/dL / Nitrite: Negative   Leuk Esterase: Negative / RBC: 3-5 /HPF / WBC x   Sq Epi: x / Non Sq Epi: Few / Bacteria: Few      MICROBIOLOGY:        RADIOLOGY & ADDITIONAL STUDIES:  ACC: [id number] EXAM:  US DPLX LWR EXT VEINS LTD RT                          PROCEDURE DATE:  2022          INTERPRETATION:  CLINICAL INFORMATION: Right leg swelling and pain    TECHNIQUE: Duplex sonography of the RIGHT LOWER extremity veins with   color and spectral Doppler, with and without compression.    FINDINGS:    There is normal compressibility of the right common femoral, femoral and   popliteal veins.  The contralateral common femoral vein is patent.  Doppler examination shows normal spontaneous and phasic flow.    No calf vein thrombosis is detected.    IMPRESSION:  No evidence of right lower extremity deep venous thrombosis.    ACC: [id number] EXAM:  XR FOOT COMP MIN 3 VIEWS RT                          PROCEDURE DATE:  2022          INTERPRETATION:  Right foot. Patient has local infection after trauma. 3   views.    Moderate hallux valgus with degeneration again noted.    Inferior calcaneal spur again seen.    No bone destruction or fracture.    Present film raises question of increased tissue reaction peripheral to   the proximal phalanx of the fifth toe.    IMPRESSION: Question increased swelling around the outer fifth toe. This   is compared to one day prior.    Assessment :   75YO M PMH of asbestos lung disease (not on home O2 but uses vibratory respiratory vest twice a day), HTN, GERD, and asthma admitted with right foot/LE pain cellulitis with lymphangitis sp trauma. No fracture noted.  Cellulitis improving.  Looks like Strep  Afebrile  WBC downtrending      Plan :   Cont Ancef  Fu cultures  Trend temps and cbc  Elevate leg  If pain does not improve - will do CT to rule out fracture      Continue with present regiment.  Appropriate use of antibiotics and adverse effects reviewed.      I have discussed the above plan of care with patient in detail. He expressed understanding of the  treatment plan . Risks, benefits and alternatives discussed in detail. I have asked if he has any questions or concerns and appropriately addressed them to the best of my ability .    > 35 minutes were spent in direct patient care reviewing notes, medications ,labs data/ imaging , discussion with multidisciplinary team.    Thank you for allowing me to participate in care of your patient .    Michael Fernandez MD  Infectious Disease  [phone]
Date/Time Patient Seen:  		  Referring MD:   Data Reviewed	       Patient is a 76y old  Male who presents with a chief complaint of right LE cellulitis (31 Mar 2022 16:49)      Subjective/HPI     PAST MEDICAL & SURGICAL HISTORY:  Asthma    Osteoarthritis    Seasonal allergies    COPD (chronic obstructive pulmonary disease)    Environmental lung disease    HTN (hypertension)    GERD (gastroesophageal reflux disease)    S/P hernia repair    S/P colonoscopy          Medication list         MEDICATIONS  (STANDING):  ALBUTerol    0.083% 2.5 milliGRAM(s) Nebulizer every 12 hours  buDESOnide    Inhalation Suspension 0.5 milliGRAM(s) Inhalation daily  ceFAZolin   IVPB 2000 milliGRAM(s) IV Intermittent every 8 hours  enoxaparin Injectable 40 milliGRAM(s) SubCutaneous every 24 hours  pantoprazole    Tablet 40 milliGRAM(s) Oral before breakfast  valsartan 40 milliGRAM(s) Oral daily    MEDICATIONS  (PRN):  acetaminophen     Tablet .. 650 milliGRAM(s) Oral every 6 hours PRN Temp greater or equal to 38C (100.4F), Mild Pain (1 - 3), Moderate Pain (4 - 6)  melatonin 3 milliGRAM(s) Oral at bedtime PRN Insomnia         Vitals log        ICU Vital Signs Last 24 Hrs  T(C): 36.9 (01 Apr 2022 05:15), Max: 38.2 (31 Mar 2022 20:30)  T(F): 98.4 (01 Apr 2022 05:15), Max: 100.8 (31 Mar 2022 20:30)  HR: 67 (01 Apr 2022 05:15) (67 - 84)  BP: 157/64 (01 Apr 2022 05:15) (124/69 - 170/75)  BP(mean): --  ABP: --  ABP(mean): --  RR: 18 (01 Apr 2022 05:15) (16 - 19)  SpO2: 95% (01 Apr 2022 05:15) (94% - 98%)           Input and Output:  I&O's Detail    31 Mar 2022 07:01  -  01 Apr 2022 06:29  --------------------------------------------------------  IN:    IV PiggyBack: 50 mL    Oral Fluid: 90 mL  Total IN: 140 mL    OUT:    Voided (mL): 875 mL  Total OUT: 875 mL    Total NET: -735 mL          Lab Data                        14.7   10.77 )-----------( 218      ( 31 Mar 2022 10:50 )             43.0     03-31    136  |  102  |  18  ----------------------------<  154<H>  3.8   |  24  |  1.13    Ca    8.2<L>      31 Mar 2022 10:50    TPro  7.2  /  Alb  3.6  /  TBili  0.8  /  DBili  x   /  AST  22  /  ALT  19  /  AlkPhos  94  03-31            Review of Systems	      Objective     Physical Examination    heart s1s2  lung dec BS  abd soft      Pertinent Lab findings & Imaging      Sergio:  NO   Adequate UO     I&O's Detail    31 Mar 2022 07:01  -  01 Apr 2022 06:29  --------------------------------------------------------  IN:    IV PiggyBack: 50 mL    Oral Fluid: 90 mL  Total IN: 140 mL    OUT:    Voided (mL): 875 mL  Total OUT: 875 mL    Total NET: -735 mL               Discussed with:     Cultures:	        Radiology                            
Patient is a 76y old  Male who presents with a chief complaint of right LE cellulitis (2022 06:28)      INTERVAL HPI/OVERNIGHT EVENTS: Patient seen and examined at bedside. No overnight events.      MEDICATIONS  (STANDING):  ALBUTerol    0.083% 2.5 milliGRAM(s) Nebulizer every 12 hours  buDESOnide    Inhalation Suspension 0.5 milliGRAM(s) Inhalation daily  ceFAZolin   IVPB 2000 milliGRAM(s) IV Intermittent every 8 hours  enoxaparin Injectable 40 milliGRAM(s) SubCutaneous every 24 hours  pantoprazole    Tablet 40 milliGRAM(s) Oral before breakfast  valsartan 40 milliGRAM(s) Oral daily    MEDICATIONS  (PRN):  acetaminophen     Tablet .. 650 milliGRAM(s) Oral every 6 hours PRN Temp greater or equal to 38C (100.4F), Mild Pain (1 - 3), Moderate Pain (4 - 6)  melatonin 3 milliGRAM(s) Oral at bedtime PRN Insomnia      Allergies    No Known Allergies    Intolerances        REVIEW OF SYSTEMS:  CONSTITUTIONAL: No fever or chills  HEENT:  No headache, no sore throat  RESPIRATORY: No cough, wheezing, or shortness of breath  CARDIOVASCULAR: No chest pain, palpitations, or leg swelling  GASTROINTESTINAL: No abd pain, nausea, vomiting, or diarrhea  GENITOURINARY: No dysuria, frequency, or hematuria  NEUROLOGICAL: no focal weakness or dizziness  MUSCULOSKELETAL: no myalgias. Admits RLE pain (improving)     Vital Signs Last 24 Hrs  T(C): 37.2 (2022 09:15), Max: 38.2 (31 Mar 2022 20:30)  T(F): 99 (2022 09:15), Max: 100.8 (31 Mar 2022 20:30)  HR: 81 (2022 09:15) (65 - 83)  BP: 146/60 (2022 09:15) (124/69 - 160/68)  BP(mean): --  RR: 18 (2022 09:15) (18 - 19)  SpO2: 95% (2022 09:15) (94% - 98%)    PHYSICAL EXAM:  GENERAL: NAD, well-groomed, well-developed  HEAD:  Atraumatic, Normocephalic  EYES: PERRLA, conjunctiva and sclera clear  ENMT: Moist mucous membranes, no lesions  NERVOUS SYSTEM:  A+Ox3, good concentration  CHEST/LUNG: diminished B/L breath sounds at bases  HEART: RRR, + S1/S2  ABDOMEN: Soft, nontender, nondistended, bowel sounds present  EXTREMITIES:  2+ peripheral pulses  SKIN: Right foot erythematous and warm      LABS:                        14.2   9.18  )-----------( 231      ( 2022 07:30 )             43.8     CBC Full  -  ( 2022 07:30 )  WBC Count : 9.18 K/uL  Hemoglobin : 14.2 g/dL  Hematocrit : 43.8 %  Platelet Count - Automated : 231 K/uL  Mean Cell Volume : 90.1 fl  Mean Cell Hemoglobin : 29.2 pg  Mean Cell Hemoglobin Concentration : 32.4 gm/dL  Auto Neutrophil # : 6.66 K/uL  Auto Lymphocyte # : 1.35 K/uL  Auto Monocyte # : 0.97 K/uL  Auto Eosinophil # : 0.13 K/uL  Auto Basophil # : 0.02 K/uL  Auto Neutrophil % : 72.6 %  Auto Lymphocyte % : 14.7 %  Auto Monocyte % : 10.6 %  Auto Eosinophil % : 1.4 %  Auto Basophil % : 0.2 %    2022 07:30    139    |  106    |  16     ----------------------------<  111    4.5     |  26     |  1.10     Ca    8.2        2022 07:30    TPro  6.8    /  Alb  3.1    /  TBili  0.6    /  DBili  x      /  AST  19     /  ALT  16     /  AlkPhos  82     2022 07:30    PT/INR - ( 31 Mar 2022 10:50 )   PT: 13.3 sec;   INR: 1.15 ratio         PTT - ( 31 Mar 2022 10:50 )  PTT:30.3 sec  Urinalysis Basic - ( 31 Mar 2022 12:10 )    Color: Yellow / Appearance: Clear / S.010 / pH: x  Gluc: x / Ketone: Negative  / Bili: Negative / Urobili: Negative mg/dL   Blood: x / Protein: 15 mg/dL / Nitrite: Negative   Leuk Esterase: Negative / RBC: 3-5 /HPF / WBC x   Sq Epi: x / Non Sq Epi: Few / Bacteria: Few      CAPILLARY BLOOD GLUCOSE              RADIOLOGY & ADDITIONAL TESTS: < from: US Duplex Venous Lower Ext Ltd, Right (22 @ 11:19) >    ACC: [id number] EXAM:  US DPLX LWR EXT VEINS LTD RT                          PROCEDURE DATE:  2022          INTERPRETATION:  CLINICAL INFORMATION: Right leg swelling and pain    TECHNIQUE: Duplex sonography of the RIGHT LOWER extremity veins with   color and spectral Doppler, with and without compression.    FINDINGS:    There is normal compressibility of the right common femoral, femoral and   popliteal veins.  The contralateral common femoral vein is patent.  Doppler examination shows normal spontaneous and phasic flow.    No calf vein thrombosis is detected.    IMPRESSION:  No evidence of right lower extremity deep venous thrombosis.          --- End of Report ---            RAJEEV GARRETT MD; Attending Radiologist  This document has been electronically signed. Mar 31 2022 11:37AM    < end of copied text >      Consultant(s) Notes Reviewed:  [x] YES  [ ] NO    Care Discussed with [x] Consultants  [x] Patient  [ ] Family  [ ]      [ x] Other; RN  DVT ppx  
Date/Time Patient Seen:  		  Referring MD:   Data Reviewed	       Patient is a 76y old  Male who presents with a chief complaint of right LE cellulitis (01 Apr 2022 10:55)      Subjective/HPI     PAST MEDICAL & SURGICAL HISTORY:  Asthma    Osteoarthritis    Seasonal allergies    COPD (chronic obstructive pulmonary disease)    Environmental lung disease    HTN (hypertension)    GERD (gastroesophageal reflux disease)    S/P hernia repair    S/P colonoscopy          Medication list         MEDICATIONS  (STANDING):  ALBUTerol    0.083% 2.5 milliGRAM(s) Nebulizer every 12 hours  buDESOnide    Inhalation Suspension 0.5 milliGRAM(s) Inhalation daily  ceFAZolin   IVPB 2000 milliGRAM(s) IV Intermittent every 8 hours  enoxaparin Injectable 40 milliGRAM(s) SubCutaneous every 24 hours  pantoprazole    Tablet 40 milliGRAM(s) Oral before breakfast  valsartan 40 milliGRAM(s) Oral daily    MEDICATIONS  (PRN):  acetaminophen     Tablet .. 650 milliGRAM(s) Oral every 6 hours PRN Temp greater or equal to 38C (100.4F), Mild Pain (1 - 3), Moderate Pain (4 - 6)  melatonin 3 milliGRAM(s) Oral at bedtime PRN Insomnia         Vitals log        ICU Vital Signs Last 24 Hrs  T(C): 36.8 (02 Apr 2022 05:05), Max: 37.2 (01 Apr 2022 09:15)  T(F): 98.2 (02 Apr 2022 05:05), Max: 99 (01 Apr 2022 09:15)  HR: 65 (02 Apr 2022 05:05) (65 - 81)  BP: 138/53 (02 Apr 2022 05:05) (138/53 - 146/70)  BP(mean): --  ABP: --  ABP(mean): --  RR: 18 (02 Apr 2022 05:05) (18 - 19)  SpO2: 94% (02 Apr 2022 05:05) (94% - 95%)           Input and Output:  I&O's Detail    31 Mar 2022 07:01  -  01 Apr 2022 07:00  --------------------------------------------------------  IN:    IV PiggyBack: 100 mL    Oral Fluid: 90 mL  Total IN: 190 mL    OUT:    Voided (mL): 875 mL  Total OUT: 875 mL    Total NET: -685 mL      01 Apr 2022 07:01  -  02 Apr 2022 06:48  --------------------------------------------------------  IN:  Total IN: 0 mL    OUT:    Voided (mL): 800 mL  Total OUT: 800 mL    Total NET: -800 mL          Lab Data                        14.3   9.10  )-----------( 260      ( 01 Apr 2022 12:51 )             42.9     04-01    137  |  105  |  16  ----------------------------<  121<H>  3.8   |  26  |  1.20    Ca    8.3<L>      01 Apr 2022 12:51    TPro  6.8  /  Alb  3.1<L>  /  TBili  0.6  /  DBili  x   /  AST  19  /  ALT  16  /  AlkPhos  82  04-01            Review of Systems	      Objective     Physical Examination    heart s1s2  lung dec BS  abd soft      Pertinent Lab findings & Imaging      Sergio:  NO   Adequate UO     I&O's Detail    31 Mar 2022 07:01  -  01 Apr 2022 07:00  --------------------------------------------------------  IN:    IV PiggyBack: 100 mL    Oral Fluid: 90 mL  Total IN: 190 mL    OUT:    Voided (mL): 875 mL  Total OUT: 875 mL    Total NET: -685 mL      01 Apr 2022 07:01  -  02 Apr 2022 06:48  --------------------------------------------------------  IN:  Total IN: 0 mL    OUT:    Voided (mL): 800 mL  Total OUT: 800 mL    Total NET: -800 mL               Discussed with:     Cultures:	        Radiology                            
[facility], Division of Infectious Diseases  YESENIA Adame Y. Patel, S. Shah, G. Casimir  [phone]  ([phone] - weekdays after 5pm and weekends)    Name: LINDA ORO  Age/Gender: 76y Male  MRN: [id number]    Interval History:  Patient seen and examined this afternoon.  Sitting in chair, states R foot pain much improved.  Denies fever, chills or any new complaints noted.  Notes reviewed. No concerning overnight events  Afebrile     Allergies: No Known Allergies    Objective:  Vitals:   T(F): 98.1 (04-02-22 @ 11:36), Max: 98.9 (04-01-22 @ 17:10)  HR: 65 (04-02-22 @ 11:36) (62 - 71)  BP: 135/68 (04-02-22 @ 11:36) (135/68 - 146/70)  RR: 18 (04-02-22 @ 11:36) (18 - 19)  SpO2: 93% (04-02-22 @ 11:36) (93% - 95%)  Physical Examination:  General: no acute distress  HEENT: NC/AT, anicteric, neck supple  Respiratory: no acc muscle use, breathing comfortably  Cardiovascular: S1 and S2 present  Gastrointestinal: normal appearing, nondistended  Extremities: R ankle edema, no cyanosis  Skin: R foot decreased erythema, no TTP    Laboratory Studies:  CBC:                       14.1   8.27  )-----------( 278      ( 02 Apr 2022 08:10 )             42.1     WBC Trend:  8.27 04-02-22 @ 08:10  9.10 04-01-22 @ 12:51  9.18 04-01-22 @ 07:30  10.77 03-31-22 @ 10:50    CMP: 04-02    138  |  104  |  18  ----------------------------<  108<H>  4.0   |  27  |  1.13    Ca    8.2<L>      02 Apr 2022 08:10    TPro  6.8  /  Alb  3.1<L>  /  TBili  0.6  /  DBili  x   /  AST  19  /  ALT  16  /  AlkPhos  82  04-01    Creatinine, Serum: 1.13 mg/dL (04-02-22 @ 08:10)  Creatinine, Serum: 1.20 mg/dL (04-01-22 @ 12:51)  Creatinine, Serum: 1.10 mg/dL (04-01-22 @ 07:30)  Creatinine, Serum: 1.13 mg/dL (03-31-22 @ 10:50)    LIVER FUNCTIONS - ( 01 Apr 2022 07:30 )  Alb: 3.1 g/dL / Pro: 6.8 g/dL / ALK PHOS: 82 U/L / ALT: 16 U/L DA / AST: 19 U/L / GGT: x           Microbiology: reviewed   Culture - Blood (collected 03-31-22 @ 16:11)  Source: .Blood Blood-Peripheral  Preliminary Report (04-01-22 @ 17:01):    No growth to date.    Culture - Blood (collected 03-31-22 @ 16:11)  Source: .Blood Blood-Peripheral  Preliminary Report (04-01-22 @ 17:01):    No growth to date.    Radiology: reviewed     Medications:  acetaminophen     Tablet .. 650 milliGRAM(s) Oral every 6 hours PRN  ALBUTerol    0.083% 2.5 milliGRAM(s) Nebulizer every 12 hours  buDESOnide    Inhalation Suspension 0.5 milliGRAM(s) Inhalation daily  ceFAZolin   IVPB 2000 milliGRAM(s) IV Intermittent every 8 hours  enoxaparin Injectable 40 milliGRAM(s) SubCutaneous every 24 hours  melatonin 3 milliGRAM(s) Oral at bedtime PRN  pantoprazole    Tablet 40 milliGRAM(s) Oral before breakfast  valsartan 40 milliGRAM(s) Oral daily    Antimicrobials:  ceFAZolin   IVPB 2000 milliGRAM(s) IV Intermittent every 8 hours  
Patient is a 76y old  Male who presents with a chief complaint of right LE cellulitis (2022 06:48)      INTERVAL HPI/OVERNIGHT EVENTS: Patient seen and examined at bedside. No overnight events.      MEDICATIONS  (STANDING):  ALBUTerol    0.083% 2.5 milliGRAM(s) Nebulizer every 12 hours  buDESOnide    Inhalation Suspension 0.5 milliGRAM(s) Inhalation daily  ceFAZolin   IVPB 2000 milliGRAM(s) IV Intermittent every 8 hours  enoxaparin Injectable 40 milliGRAM(s) SubCutaneous every 24 hours  pantoprazole    Tablet 40 milliGRAM(s) Oral before breakfast  valsartan 40 milliGRAM(s) Oral daily    MEDICATIONS  (PRN):  acetaminophen     Tablet .. 650 milliGRAM(s) Oral every 6 hours PRN Temp greater or equal to 38C (100.4F), Mild Pain (1 - 3), Moderate Pain (4 - 6)  melatonin 3 milliGRAM(s) Oral at bedtime PRN Insomnia      Allergies    No Known Allergies    Intolerances          REVIEW OF SYSTEMS:  CONSTITUTIONAL: No fever or chills  HEENT:  No headache, no sore throat  RESPIRATORY: No cough, wheezing, or shortness of breath  CARDIOVASCULAR: No chest pain, palpitations, or leg swelling  GASTROINTESTINAL: No abd pain, nausea, vomiting, or diarrhea  GENITOURINARY: No dysuria, frequency, or hematuria  NEUROLOGICAL: no focal weakness or dizziness  MUSCULOSKELETAL: no myalgias. Admits RLE pain (improving)    Vital Signs Last 24 Hrs  T(C): 36.7 (2022 11:36), Max: 37.2 (2022 17:10)  T(F): 98.1 (2022 11:36), Max: 98.9 (2022 17:10)  HR: 65 (2022 11:36) (62 - 71)  BP: 135/68 (2022 11:36) (135/68 - 146/70)  BP(mean): --  RR: 18 (2022 11:36) (18 - 19)  SpO2: 93% (2022 11:36) (93% - 95%)    PHYSICAL EXAM:  GENERAL: NAD, well-groomed, well-developed  HEAD:  Atraumatic, Normocephalic  EYES: PERRLA, conjunctiva and sclera clear  ENMT: Moist mucous membranes, no lesions  NERVOUS SYSTEM:  A+Ox3, good concentration  CHEST/LUNG: diminished B/L breath sounds at bases  HEART: RRR, + S1/S2  ABDOMEN: Soft, nontender, nondistended, bowel sounds present  EXTREMITIES:  2+ peripheral pulses  SKIN: Right foot slightly erythematous and warm, much improved      LABS:                        14.1   8.27  )-----------( 278      ( 2022 08:10 )             42.1     CBC Full  -  ( 2022 08:10 )  WBC Count : 8.27 K/uL  Hemoglobin : 14.1 g/dL  Hematocrit : 42.1 %  Platelet Count - Automated : 278 K/uL  Mean Cell Volume : 89.0 fl  Mean Cell Hemoglobin : 29.8 pg  Mean Cell Hemoglobin Concentration : 33.5 gm/dL  Auto Neutrophil # : x  Auto Lymphocyte # : x  Auto Monocyte # : x  Auto Eosinophil # : x  Auto Basophil # : x  Auto Neutrophil % : x  Auto Lymphocyte % : x  Auto Monocyte % : x  Auto Eosinophil % : x  Auto Basophil % : x    2022 08:10    138    |  104    |  18     ----------------------------<  108    4.0     |  27     |  1.13     Ca    8.2        2022 08:10        Urinalysis Basic - ( 31 Mar 2022 12:10 )    Color: Yellow / Appearance: Clear / S.010 / pH: x  Gluc: x / Ketone: Negative  / Bili: Negative / Urobili: Negative mg/dL   Blood: x / Protein: 15 mg/dL / Nitrite: Negative   Leuk Esterase: Negative / RBC: 3-5 /HPF / WBC x   Sq Epi: x / Non Sq Epi: Few / Bacteria: Few      CAPILLARY BLOOD GLUCOSE            Culture - Blood (collected 22 @ 16:11)  Source: .Blood Blood-Peripheral  Preliminary Report (22 @ 17:01):    No growth to date.    Culture - Blood (collected 22 @ 16:11)  Source: .Blood Blood-Peripheral  Preliminary Report (22 @ 17:01):    No growth to date.        RADIOLOGY & ADDITIONAL TESTS:     Consultant(s) Notes Reviewed:  [x] YES  [ ] NO    Care Discussed with [x] Consultants  [x] Patient  [ ] Family  [ ]      [ x] Other; RN  DVT ppx

## 2022-04-02 NOTE — DISCHARGE NOTE PROVIDER - HOSPITAL COURSE
FROM ADMISSION H+P:   HPI:  This is a 76 year old male w/ PMH of asbestos lung disease (not on home O2 but uses vibratory respiratory vest twice a day), HTN, GERD, and asthma who presents w/ right LE pain and redness s/p mechanical fall (tripped on carpet) on Monday denies LOC and head strike. Pt. rates pain at 9/10 prior to tylenol, 1/10 post tylenol. Pt. endorses erythema and swelling. Pt. denies CP, palps, dizziness, and numbness/tingling. Pt. being admitted for treatment of RLE cellulitis.    ED Course:  - Labs sent: CBC, CMP, Coags, lactate, SED rate, COVID  - UA and blood cultures x2 sent  - Imaging: CXR, RLE duplex, right foot x-ray  - EKG w/ no acute ischemic changes  - 2L NS IV, tylenol 650mg, zosyn and vanco given  (31 Mar 2022 12:48)      ---  HOSPITAL COURSE:   # RLE cellulitis  - Evidenced by PE findings, leukocytosis w/ bandemia  - Right foot x-ray negative for acute fx  - RLE duplex negative for DVT  - Pain management w/ tylenol  - treated with Unasyn,   - blood culture NGTD  - MRSA swab neg    # Asbestos chronic lung disease (not requiring home O2), asthma  - Albuterol, budesonide  - Pulm input appreciated     # HTN  Chronic  Continue Valsartan  Vitals per routine  Dash Diet    # GERD  - Protonix    ---  TIME SPENT:  I, the attending physician, was physically present for the key portions of the evaluation and management (E/M) service provided. The total amount of time spent reviewing the hospital notes, laboratory values, imaging findings, assessing/counseling the patient, discussing with consultant physicians, social work, nursing staff was 56 minutes FROM ADMISSION H+P:   HPI:  This is a 76 year old male w/ PMH of asbestos lung disease (not on home O2 but uses vibratory respiratory vest twice a day), HTN, GERD, and asthma who presents w/ right LE pain and redness s/p mechanical fall (tripped on carpet) on Monday denies LOC and head strike. Pt. rates pain at 9/10 prior to tylenol, 1/10 post tylenol. Pt. endorses erythema and swelling. Pt. denies CP, palps, dizziness, and numbness/tingling. Pt. being admitted for treatment of RLE cellulitis.    ED Course:  - Labs sent: CBC, CMP, Coags, lactate, SED rate, COVID  - UA and blood cultures x2 sent  - Imaging: CXR, RLE duplex, right foot x-ray  - EKG w/ no acute ischemic changes  - 2L NS IV, tylenol 650mg, zosyn and vanco given  (31 Mar 2022 12:48)      ---  HOSPITAL COURSE:   # RLE cellulitis  - Evidenced by PE findings, leukocytosis w/ bandemia  - Right foot x-ray negative for acute fx  - RLE duplex negative for DVT  - Pain management w/ tylenol  - treated with Unasyn,   - blood culture NGTD  - MRSA swab neg  - D/w ID, change to Keflex 500mg po q6 to finish 7 day course    # Asbestos chronic lung disease (not requiring home O2), asthma  - Albuterol, budesonide  - Pulm input appreciated     # HTN  Chronic  Continue Valsartan  Vitals per routine  Dash Diet    # GERD  - Protonix    ---  TIME SPENT:  I, the attending physician, was physically present for the key portions of the evaluation and management (E/M) service provided. The total amount of time spent reviewing the hospital notes, laboratory values, imaging findings, assessing/counseling the patient, discussing with consultant physicians, social work, nursing staff was 56 minutes

## 2022-04-02 NOTE — PROGRESS NOTE ADULT - REASON FOR ADMISSION
right LE cellulitis

## 2022-04-02 NOTE — DISCHARGE NOTE PROVIDER - NSDCCPCAREPLAN_GEN_ALL_CORE_FT
PRINCIPAL DISCHARGE DIAGNOSIS  Diagnosis: Cellulitis  Assessment and Plan of Treatment: Seen by infectious diseases and treated with course of IV antibiotics. Follow up with PCP within 1 week of discharge. Return to ED with new or worsening symptoms      SECONDARY DISCHARGE DIAGNOSES  Diagnosis: HTN (hypertension)  Assessment and Plan of Treatment: You were previously diagnosed with high blood pressure. Please continue on your home medications at this time and follow up with your PMD for further surveillance and management of your high blood pressure.      Diagnosis: Asbestosis  Assessment and Plan of Treatment: Follow up with pulmonary outpatient as directed     PRINCIPAL DISCHARGE DIAGNOSIS  Diagnosis: Cellulitis  Assessment and Plan of Treatment: Seen by infectious diseases and treated with course of IV antibiotics. Follow up with PCP within 1 week of discharge. Return to ED with new or worsening symptoms. Continue Keflex (antbiotic) every 6 hours for 5 more days      SECONDARY DISCHARGE DIAGNOSES  Diagnosis: HTN (hypertension)  Assessment and Plan of Treatment: You were previously diagnosed with high blood pressure. Please continue on your home medications at this time and follow up with your PMD for further surveillance and management of your high blood pressure.      Diagnosis: Asbestosis  Assessment and Plan of Treatment: Follow up with pulmonary outpatient as directed

## 2022-04-02 NOTE — DISCHARGE NOTE PROVIDER - NSDCMRMEDTOKEN_GEN_ALL_CORE_FT
Advair Diskus 250 mcg-50 mcg inhalation powder: 1 puff(s) inhaled 2 times a day  albuterol CFC free 90 mcg/inh inhalation aerosol: 2 puff(s) inhaled every 4 hours, As needed, Shortness of Breath  arformoterol 15 mcg/2 mL inhalation solution:   budesonide 0.5 mg/2 mL inhalation suspension: inhaled once a day  Protonix 40 mg oral delayed release tablet: 1 tab(s) orally once a day  valsartan 40 mg oral tablet: 1 tab(s) orally once a day   Advair Diskus 250 mcg-50 mcg inhalation powder: 1 puff(s) inhaled 2 times a day  albuterol CFC free 90 mcg/inh inhalation aerosol: 2 puff(s) inhaled every 4 hours, As needed, Shortness of Breath  arformoterol 15 mcg/2 mL inhalation solution:   budesonide 0.5 mg/2 mL inhalation suspension: inhaled once a day  cephalexin 500 mg oral capsule: 1 cap(s) orally every 6 hours   Protonix 40 mg oral delayed release tablet: 1 tab(s) orally once a day  valsartan 40 mg oral tablet: 1 tab(s) orally once a day

## 2022-04-02 NOTE — PROGRESS NOTE ADULT - ASSESSMENT
75YO M PMH of asbestos lung disease (not on home O2 but uses vibratory respiratory vest twice a day), HTN, GERD, and asthma admitted with right foot/LE pain cellulitis with lymphangitis sp trauma. No fracture noted.  Cellulitis improving.  Looks like Strep  Afebrile, WBC normalized   Blood cultures NGTD x2  Feels better, Rt foot pain much improved     Plan :   Cont Ancef while inpt  -on d/c - switch to Keflex 500mg PO Q6h to complete total 7d course - end 4/6  Trend temps and cbc  Elevate leg  Discharge planning per primary team       D/w Dr. Lake  Covering for Dr. Fernandez.  Celestino Cabrera M.D.  [facility], Division of Infectious Diseases  [phone]  After 5pm on weekdays and all day on weekends - please call [phone]
76 year old male w/ PMH of asbestos lung disease (not on home O2 but uses vibratory respiratory vest twice a day), HTN, GERD, and asthma who presents w/ right LE pain and redness s/p mechanical fall (tripped on carpet)    asbestos exp - occupational exposure -   cough variant asthma - follows with Pulm - Dr Ventura - in [place]  STSI  Fall  HTN  GERD    MRSA neg  ID follow up  VBG noted  on RA  afebrile overnight    wound monitoring - skin monitoring  monitor WBC and biomarkers  chronic lung disease - asbestos related lung disease - asthma -   uses Bronchodilators (pt prefers nebulized albuterol vs inhaled) - and Inhaled Steroids -   seasonal vaccination  chest PT    spoke with daughter  reviewed outpatient records - follows with Dr Jacome - Medicine
76 year old male w/ PMH of asbestos lung disease (not on home O2 but uses vibratory respiratory vest twice a day), HTN, GERD, and asthma who presents w/ right LE pain and redness s/p mechanical fall (tripped on carpet)    asbestos exp - occupational exposure -   cough variant asthma - follows with Pulm - Dr Ventura - in [place]  STSI  Fall  HTN  GERD    fever overnight, already on ABX - ID follow up - vs noted -     ID eval  ABX  wound monitoring - skin monitoring  monitor WBC and biomarkers  chronic lung disease - asbestos related lung disease - asthma -   uses Bronchodilators (pt prefers nebulized albuterol vs inhaled) - and Inhaled Steroids -   seasonal vaccination  chest PT  will check VBG  spoke with daughter  reviewed outpatient records - follows with Dr Jacome - Medicine
This is a 76 year old male w/ PMH of asbestos lung disease (not on home O2 but uses vibratory respiratory vest twice a day), HTN, GERD, and asthma who presents w/ right LE pain and redness s/p mechanical fall (tripped on carpet) on Monday denies LOC and head strike. Pt. rates pain at 9/10 prior to tylenol, 1/10 post tylenol. Pt. endorses erythema and swelling. Pt. denies CP, palps, dizziness, and numbness/tingling. In ED pt. found to be leukocytotic Pt. being admitted for treatment of RLE cellulitis.    # RLE cellulitis  - Evidenced by PE findings, leukocytosis w/ bandemia  - Right foot x-ray negative for acute fx  - RLE duplex negative for DVT  - Pain management w/ tylenol  - Blood cultures pending   - On Unasyn, blood culture NGTD  - MRSA swab neg    # Asbestos chronic lung disease (not requiring home O2), asthma  - Albuterol, budesonide  - Pulm input appreciated     # HTN  Chronic  Continue Valsartan  Vitals per routine  Dash Diet    # GERD  - Protonix    # DVT prophylaxis  - Lovenox 40mg SQ daily
This is a 76 year old male w/ PMH of asbestos lung disease (not on home O2 but uses vibratory respiratory vest twice a day), HTN, GERD, and asthma who presents w/ right LE pain and redness s/p mechanical fall (tripped on carpet) on Monday denies LOC and head strike. Pt. rates pain at 9/10 prior to tylenol, 1/10 post tylenol. Pt. endorses erythema and swelling. Pt. denies CP, palps, dizziness, and numbness/tingling. In ED pt. found to be leukocytotic Pt. being admitted for treatment of RLE cellulitis.    # RLE cellulitis  - Evidenced by PE findings, leukocytosis w/ bandemia  - Right foot x-ray negative for acute fx  - RLE duplex negative for DVT  - Pain management w/ tylenol  - Blood cultures pending   - On Unasyn  - MRSA swab    # Asbestos chronic lung disease (not requiring home O2), asthma  - Albuterol, budesonide  - Pulm input appreciated     # HTN  Chronic  Continue Valsartan  Vitals per routine  Dash Diet    # GERD  - Protonix    # DVT prophylaxis  - Lovenox 40mg SQ daily

## 2022-04-04 ENCOUNTER — APPOINTMENT (OUTPATIENT)
Dept: FAMILY MEDICINE | Facility: CLINIC | Age: 77
End: 2022-04-04

## 2022-04-04 ENCOUNTER — NON-APPOINTMENT (OUTPATIENT)
Age: 77
End: 2022-04-04

## 2022-04-04 PROBLEM — K21.9 GASTRO-ESOPHAGEAL REFLUX DISEASE WITHOUT ESOPHAGITIS: Chronic | Status: ACTIVE | Noted: 2022-03-31

## 2022-04-04 PROBLEM — J98.4 OTHER DISORDERS OF LUNG: Chronic | Status: ACTIVE | Noted: 2022-03-31

## 2022-04-04 PROBLEM — I10 ESSENTIAL (PRIMARY) HYPERTENSION: Chronic | Status: ACTIVE | Noted: 2022-03-31

## 2022-04-05 LAB
CULTURE RESULTS: SIGNIFICANT CHANGE UP
CULTURE RESULTS: SIGNIFICANT CHANGE UP
SPECIMEN SOURCE: SIGNIFICANT CHANGE UP
SPECIMEN SOURCE: SIGNIFICANT CHANGE UP

## 2022-04-08 ENCOUNTER — APPOINTMENT (OUTPATIENT)
Dept: INTERNAL MEDICINE | Facility: CLINIC | Age: 77
End: 2022-04-08
Payer: MEDICARE

## 2022-04-08 VITALS
WEIGHT: 178 LBS | TEMPERATURE: 97.4 F | DIASTOLIC BLOOD PRESSURE: 70 MMHG | HEIGHT: 67 IN | BODY MASS INDEX: 27.94 KG/M2 | OXYGEN SATURATION: 96 % | HEART RATE: 67 BPM | RESPIRATION RATE: 12 BRPM | SYSTOLIC BLOOD PRESSURE: 140 MMHG

## 2022-04-08 DIAGNOSIS — J43.8 OTHER EMPHYSEMA: Chronic | ICD-10-CM

## 2022-04-08 DIAGNOSIS — Z23 ENCOUNTER FOR IMMUNIZATION: ICD-10-CM

## 2022-04-08 DIAGNOSIS — L03.90 CELLULITIS, UNSPECIFIED: ICD-10-CM

## 2022-04-08 PROCEDURE — 99496 TRANSJ CARE MGMT HIGH F2F 7D: CPT

## 2022-04-08 NOTE — HISTORY OF PRESENT ILLNESS
[Post-hospitalization from ___ Hospital] : Post-hospitalization from [unfilled] Hospital [Admitted on: ___] : The patient was admitted on [unfilled] [Discharged on ___] : discharged on [unfilled] [Patient Contacted By: ____] : and contacted by [unfilled] [FreeTextEntry2] : Patient had tripped on his carpeting and developed a cellulitis in his right foot.  He was hospitalized and given antibiotics.  Blood cultures were negative swab for MRSA was negative.\par He completed the 5-day course of antibiotics but still has mild pain, swelling and redness in his foot.

## 2022-04-08 NOTE — PLAN
[FreeTextEntry1] : Duricef script sent to pharmacy.\par Advised to keep foot elevated, warm compresses.\par Avoid physical therapy for now.

## 2022-04-15 ENCOUNTER — APPOINTMENT (OUTPATIENT)
Dept: INTERNAL MEDICINE | Facility: CLINIC | Age: 77
End: 2022-04-15

## 2022-04-22 ENCOUNTER — TRANSCRIPTION ENCOUNTER (OUTPATIENT)
Age: 77
End: 2022-04-22

## 2022-07-18 ENCOUNTER — RX RENEWAL (OUTPATIENT)
Age: 77
End: 2022-07-18

## 2022-09-01 ENCOUNTER — EMERGENCY (EMERGENCY)
Facility: HOSPITAL | Age: 77
LOS: 1 days | Discharge: HOME | End: 2022-09-01
Attending: INTERNAL MEDICINE | Admitting: INTERNAL MEDICINE
Payer: MEDICARE

## 2022-09-01 VITALS
RESPIRATION RATE: 18 BRPM | TEMPERATURE: 99 F | HEIGHT: 67 IN | SYSTOLIC BLOOD PRESSURE: 190 MMHG | OXYGEN SATURATION: 96 % | HEART RATE: 82 BPM | DIASTOLIC BLOOD PRESSURE: 81 MMHG | WEIGHT: 169.98 LBS

## 2022-09-01 DIAGNOSIS — Z98.89 OTHER SPECIFIED POSTPROCEDURAL STATES: Chronic | ICD-10-CM

## 2022-09-01 PROCEDURE — 99283 EMERGENCY DEPT VISIT LOW MDM: CPT

## 2022-09-01 RX ORDER — NEOMYCIN/POLYMYXIN B/HYDROCORT
4 SUSPENSION, DROPS(FINAL DOSAGE FORM)(ML) OTIC (EAR) ONCE
Refills: 0 | Status: COMPLETED | OUTPATIENT
Start: 2022-09-01 | End: 2022-09-01

## 2022-09-01 RX ADMIN — Medication 4 DROP(S): at 07:33

## 2022-09-01 NOTE — ED PROVIDER NOTE - PHYSICAL EXAMINATION
General:     NAD, well-nourished, well-appearing  HEENT L TM + erythema L ear canal mild erythema L TM, no fluid , no foreign body seen   Head:     NC/AT, EOMI, oral mucosa moist  Neck:     trachea midline  Lungs:     CTA b/l, no w/r/r  CVS:     S1S2, RRR, no m/g/r  Abd:     +BS, s/nt/nd, no organomegaly  Ext:    2+ radial and pedal pulses, no c/c/e  Neuro: AAOx3, no sensory/motor deficits

## 2022-09-01 NOTE — ED PROVIDER NOTE - NSFOLLOWUPINSTRUCTIONS_ED_ALL_ED_FT
Follow up with your PMD within 1-2 days.  Rest, increase your fluids, advance your activity as tolerated.   Take all of your other medications as previously prescribed.   Worsening, continued or ANY new concerning symptoms return to the emergency department.  cortisporin 4 drops three times daily L ear , follow up with ent

## 2022-09-01 NOTE — ED PROVIDER NOTE - CARE PROVIDER_API CALL
Bishop Alarcon  OTOLARYNGOLOGY  [address] [place] [address] [phone]  Phone: [phone]  Fax: [phone]  Follow Up Time:

## 2022-09-01 NOTE — ED PROVIDER NOTE - NS ED ATTENDING STATEMENT MOD
twisted left ankle this morning, tripped off curb. denies hitting head or LOC. GCS=15. no blood thinners. alert and in no distress.
Attending Only

## 2022-09-01 NOTE — ED ADULT NURSE NOTE - OBJECTIVE STATEMENT
pt a&ox3 ambulatory to ED states a "bug flew into my ear." Upon exam, no insect in ear, redness noted to tympanic membrane. Pt denies feeling any movement in ear at this time. No bleeding or drainage present.

## 2022-09-01 NOTE — ED ADULT NURSE NOTE - SUICIDE SCREENING QUESTION 1
Chart reviewed. Events to date noted. Pt encountered supine in bed, A+Ox4, + RUE IV lock, + L knee ace bandage clean/dry/intact, orthopedist in room, pt agreeable to PT evaluation. No

## 2022-09-01 NOTE — ED ADULT TRIAGE NOTE - DOMESTIC TRAVEL HIGH RISK QUESTION
H&P Exam - Pediatric   Salome Saluda Beltran 4 m o  female MRN: [id number]  Unit/Bed#: [facility] 858-01 Encounter: [id number]    Assessment/Plan     Assessment: This is a 2 month old female with RSV bronchiolitis with post tussive emesis  Her RR is in the mid 40s  She currently does not require supplemental O2  Patient Active Problem List   Diagnosis   (none) - all problems resolved or deleted       Plan:  Ins and Outs  Supportive Care  Nasal suctioning  D5NS IV  Acetaminophen q6 prn  Spot pulse ox  Vitals per routine  Normal diet-Formula    History of Present Illness     Chief Complaint: Cough with emesis  HPI:  Ketan Smoker is a 4 m o  female who presents with a cough with emesis x3 days  Patient was seen by her pediatrician on Monday who tested for RSV, which was positive  Patient has been vomiting both formula and pedialyte after heavy bouts of coughing  No fevers noted      Historical Information   Birth History:  Ketan Smoker is a 3040 g (6 lb 11 2 oz)product born to a 45 y o   mother  Mother's Gestational Age: 36w4d  Delivery Method was Vaginal, Spontaneous  No past medical history on file  all medications and allergies reviewed  No Known Allergies    No past surgical history on file  Growth and Development: normal  Nutrition: formula feeding  Hospitalizations: none  Immunizations: deferred by pediatrician due to current illness  Flu Shot: No   Family History: non-contributory    Social History   School/: No   Tobacco exposure: No   Pets: No   Travel: No   Household: lives at home with parents and siblings    Review of Systems   Constitutional: Positive for appetite change  Negative for diaphoresis and fever  HENT: Positive for congestion, rhinorrhea and sneezing  Negative for ear discharge  Eyes: Negative for discharge and redness  Respiratory: Positive for cough, choking and wheezing  Gastrointestinal: Positive for vomiting   Negative for constipation and diarrhea  Genitourinary: Positive for decreased urine volume  Skin: Negative for rash  Allergic/Immunologic: Negative for food allergies  Neurological: Negative for seizures  Hematological: Negative for adenopathy  Objective   Vitals:   Blood pressure (!) 101/56, pulse 155, temperature 98 °F (36 7 °C), temperature source Axillary, resp  rate 52, SpO2 96 %  Weight:   No weight on file for this encounter  No height on file for this encounter  There is no height or weight on file to calculate BMI    , No head circumference on file for this encounter  Physical Exam   Constitutional: She appears well-developed and well-nourished  She is active  She has a strong cry  HENT:   Head: Anterior fontanelle is flat  Nose: Nasal discharge present  Mouth/Throat: Mucous membranes are moist    Eyes: Right eye exhibits no discharge  Left eye exhibits no discharge  Cardiovascular: Regular rhythm, S1 normal and S2 normal    No murmur heard  Pulmonary/Chest: Tachypnea noted  She has wheezes  Neurological: She is alert  She has normal strength  Suck normal    Skin: Skin is warm  No rash noted  Lab Results: I have personally reviewed pertinent lab results  Imaging:   Xr Chest 2 Views    Result Date: 2019  Narrative: CHEST INDICATION:   cough temp 101  COMPARISON:  None EXAM PERFORMED/VIEWS:  XR CHEST PA & LATERAL FINDINGS: Cardiothymic silhouette is normal  The lungs are clear  No pneumothorax or pleural effusion  Osseous structures appear within normal limits for patient age  Impression: No acute cardiopulmonary disease   Workstation performed: [id number]     Other Studies: RSV+    Mamadou Ramírez MD  PGY-1 No

## 2022-09-01 NOTE — ED ADULT TRIAGE NOTE - PRO INTERPRETER NEED 2
English O-T Advancement Flap Text: The defect edges were debeveled with a #15 scalpel blade.  Given the location of the defect, shape of the defect and the proximity to free margins an O-T advancement flap was deemed most appropriate.  Using a sterile surgical marker, an appropriate advancement flap was drawn incorporating the defect and placing the expected incisions within the relaxed skin tension lines where possible.    The area thus outlined was incised deep to adipose tissue with a #15 scalpel blade.  The skin margins were undermined to an appropriate distance in all directions utilizing iris scissors.

## 2022-09-01 NOTE — ED PROVIDER NOTE - PATIENT PORTAL LINK FT
You can access the FollowMyHealth Patient Portal offered by [facility] by registering at the following website: [email or web address]. By joining [employer]’s FollowMyHealth portal, you will also be able to view your health information using other applications (apps) compatible with our system.

## 2022-09-13 ENCOUNTER — OUTPATIENT (OUTPATIENT)
Dept: OUTPATIENT SERVICES | Facility: HOSPITAL | Age: 77
LOS: 1 days | End: 2022-09-13
Payer: MEDICARE

## 2022-09-13 ENCOUNTER — APPOINTMENT (OUTPATIENT)
Dept: CT IMAGING | Facility: HOSPITAL | Age: 77
End: 2022-09-13

## 2022-09-13 DIAGNOSIS — Z77.090 CONTACT WITH AND (SUSPECTED) EXPOSURE TO ASBESTOS: ICD-10-CM

## 2022-09-13 DIAGNOSIS — Z98.89 OTHER SPECIFIED POSTPROCEDURAL STATES: Chronic | ICD-10-CM

## 2022-09-13 PROCEDURE — 71250 CT THORAX DX C-: CPT | Mod: 26,MH

## 2022-09-13 PROCEDURE — 71250 CT THORAX DX C-: CPT

## 2022-09-23 ENCOUNTER — NON-APPOINTMENT (OUTPATIENT)
Age: 77
End: 2022-09-23

## 2023-08-07 RX ORDER — VALSARTAN 40 MG/1
40 TABLET, COATED ORAL DAILY
Qty: 90 | Refills: 3 | Status: ACTIVE | COMMUNITY
Start: 2021-07-23 | End: 1900-01-01

## 2023-08-25 ENCOUNTER — APPOINTMENT (OUTPATIENT)
Dept: INTERNAL MEDICINE | Facility: CLINIC | Age: 78
End: 2023-08-25
Payer: MEDICARE

## 2023-08-25 VITALS
DIASTOLIC BLOOD PRESSURE: 78 MMHG | RESPIRATION RATE: 14 BRPM | OXYGEN SATURATION: 98 % | HEART RATE: 62 BPM | WEIGHT: 170 LBS | SYSTOLIC BLOOD PRESSURE: 136 MMHG | BODY MASS INDEX: 26.68 KG/M2 | HEIGHT: 67 IN | TEMPERATURE: 97.6 F

## 2023-08-25 DIAGNOSIS — M17.0 BILATERAL PRIMARY OSTEOARTHRITIS OF KNEE: ICD-10-CM

## 2023-08-25 DIAGNOSIS — E78.5 HYPERLIPIDEMIA, UNSPECIFIED: ICD-10-CM

## 2023-08-25 PROCEDURE — G0439: CPT

## 2023-08-25 RX ORDER — AMOXICILLIN 500 MG/1
500 CAPSULE ORAL
Qty: 10 | Refills: 0 | Status: DISCONTINUED | COMMUNITY
Start: 2020-10-09 | End: 2023-08-25

## 2023-08-25 RX ORDER — PANTOPRAZOLE 40 MG/1
40 TABLET, DELAYED RELEASE ORAL
Refills: 0 | Status: ACTIVE | COMMUNITY
Start: 2023-08-25

## 2023-08-25 RX ORDER — CEFADROXIL 500 MG/1
500 CAPSULE ORAL TWICE DAILY
Qty: 10 | Refills: 1 | Status: DISCONTINUED | COMMUNITY
Start: 2022-04-08 | End: 2023-08-25

## 2023-08-25 RX ORDER — METHYLPREDNISOLONE 4 MG/1
4 TABLET ORAL
Qty: 1 | Refills: 0 | Status: DISCONTINUED | COMMUNITY
Start: 2018-03-30 | End: 2023-08-25

## 2023-08-25 RX ORDER — DUPILUMAB 300 MG/2ML
300 INJECTION, SOLUTION SUBCUTANEOUS
Refills: 0 | Status: ACTIVE | COMMUNITY
Start: 2023-08-25

## 2023-08-25 RX ORDER — ARFORMOTEROL TARTRATE 15 UG/2ML
15 SOLUTION RESPIRATORY (INHALATION)
Refills: 0 | Status: ACTIVE | COMMUNITY
Start: 2023-08-25

## 2023-08-25 RX ORDER — BUDESONIDE 0.5 MG/2ML
0.5 INHALANT ORAL
Refills: 0 | Status: ACTIVE | COMMUNITY
Start: 2023-08-25

## 2023-08-25 RX ORDER — VALSARTAN 40 MG/1
40 TABLET, COATED ORAL
Refills: 0 | Status: ACTIVE | COMMUNITY
Start: 2023-08-25

## 2023-08-25 NOTE — HISTORY OF PRESENT ILLNESS
[de-identified] : Patient here today for his annual exam.  Just had a cardiac cath at [facility] 2 days ago.  He reports the cath was normal no need for stent placement.  He only has the discharge summary with him.  He had a catheterization because he was complaining of dyspnea.  He had seen his pulmonologist who recommended he see a cardiologist who recommended an angiogram. He also complains of pain in his left knee

## 2023-08-25 NOTE — HEALTH RISK ASSESSMENT
[Good] : ~his/her~  mood as  good [No] : In the past 12 months have you used drugs other than those required for medical reasons? No [No falls in past year] : Patient reported no falls in the past year [0] : 2) Feeling down, depressed, or hopeless: Not at all (0) [None] : None [With Family] : lives with family [Retired] : retired [] :  [# Of Children ___] : has [unfilled] children [Feels Safe at Home] : Feels safe at home [Fully functional (bathing, dressing, toileting, transferring, walking, feeding)] : Fully functional (bathing, dressing, toileting, transferring, walking, feeding) [Fully functional (using the telephone, shopping, preparing meals, housekeeping, doing laundry, using] : Fully functional and needs no help or supervision to perform IADLs (using the telephone, shopping, preparing meals, housekeeping, doing laundry, using transportation, managing medications and managing finances) [Reports normal functional visual acuity (ie: able to read med bottle)] : Reports normal functional visual acuity [Smoke Detector] : smoke detector [Seat Belt] :  uses seat belt [Sunscreen] : uses sunscreen [Never] : Never [FreeTextEntry1] : none [de-identified] : none [de-identified] : cardiologist  [Audit-CScore] : 0 [de-identified] : walking [de-identified] : regular diet  [ZUB8Rname] : 0 [Change in mental status noted] : No change in mental status noted [Language] : denies difficulty with language [Behavior] : denies difficulty with behavior [Learning/Retaining New Information] : denies difficulty learning/retaining new information [Sexually Active] : not sexually active [High Risk Behavior] : no high risk behavior [Reports changes in hearing] : Reports no changes in hearing [Reports changes in vision] : Reports no changes in vision [Reports changes in dental health] : Reports no changes in dental health [Travel to Developing Areas] : does not  travel to developing areas [de-identified] : left crown

## 2023-08-25 NOTE — PLAN
[FreeTextEntry1] : We will try to get the report of his cardiac catheterization and recent blood work from [facility]. Patient refusing orthopedic follow-up at this time.

## 2023-11-22 ENCOUNTER — NON-APPOINTMENT (OUTPATIENT)
Age: 78
End: 2023-11-22

## 2023-11-24 NOTE — PATIENT PROFILE ADULT - INFLUENZA IMMUNIZATION DATE (APPROXIMATE)
Left voicemail for pt to call Endoscopy Scheduling to confirm Colonoscopy on 12/1/23.    31-Oct-2021

## 2023-12-04 NOTE — DISCHARGE NOTE NURSING/CASE MANAGEMENT/SOCIAL WORK - NSFLUVACAGEDISCH_IMM_ALL_CORE
Patient instructed to go directly to [facility] ultrasound and to wait for results and if positive, will need to go to [facility] ED. Patient and spouse verbalized an understanding. Called [facility] and left message that the patient is on the way. Ultrasound technician called back to say she is leaving now. Dr Jessica Ramos asked if she could stay to do the ultrasound and technician agreed. Patient instructed to go directly there. Given written order for ultrasound.       Homer Hughes RN  12/03/23 [id number]
Adult

## 2023-12-08 ENCOUNTER — APPOINTMENT (OUTPATIENT)
Dept: INTERNAL MEDICINE | Facility: CLINIC | Age: 78
End: 2023-12-08
Payer: MEDICARE

## 2023-12-08 VITALS
OXYGEN SATURATION: 98 % | HEIGHT: 67 IN | SYSTOLIC BLOOD PRESSURE: 132 MMHG | DIASTOLIC BLOOD PRESSURE: 86 MMHG | HEART RATE: 80 BPM | RESPIRATION RATE: 16 BRPM | BODY MASS INDEX: 26.68 KG/M2 | TEMPERATURE: 97.6 F | WEIGHT: 170 LBS

## 2023-12-08 DIAGNOSIS — E11.9 TYPE 2 DIABETES MELLITUS W/OUT COMPLICATIONS: ICD-10-CM

## 2023-12-08 DIAGNOSIS — J11.1 INFLUENZA DUE TO UNIDENTIFIED INFLUENZA VIRUS WITH OTHER RESPIRATORY MANIFESTATIONS: ICD-10-CM

## 2023-12-08 DIAGNOSIS — I10 ESSENTIAL (PRIMARY) HYPERTENSION: ICD-10-CM

## 2023-12-08 DIAGNOSIS — J45.991 COUGH VARIANT ASTHMA: ICD-10-CM

## 2023-12-08 PROCEDURE — 99495 TRANSJ CARE MGMT MOD F2F 14D: CPT

## 2023-12-08 RX ORDER — DUPILUMAB 300 MG/2ML
300 INJECTION, SOLUTION SUBCUTANEOUS
Refills: 0 | Status: ACTIVE | COMMUNITY
Start: 2023-12-08

## 2023-12-08 RX ORDER — ALBUTEROL SULFATE 2.5 MG/3ML
(2.5 MG/3ML) SOLUTION RESPIRATORY (INHALATION)
Qty: 1 | Refills: 3 | Status: ACTIVE | COMMUNITY
Start: 2016-03-29 | End: 1900-01-01

## 2023-12-09 ENCOUNTER — NON-APPOINTMENT (OUTPATIENT)
Age: 78
End: 2023-12-09

## 2023-12-10 ENCOUNTER — EMERGENCY (EMERGENCY)
Facility: HOSPITAL | Age: 78
LOS: 1 days | Discharge: HOME | End: 2023-12-10
Attending: STUDENT IN AN ORGANIZED HEALTH CARE EDUCATION/TRAINING PROGRAM | Admitting: STUDENT IN AN ORGANIZED HEALTH CARE EDUCATION/TRAINING PROGRAM
Payer: MEDICARE

## 2023-12-10 VITALS
RESPIRATION RATE: 18 BRPM | TEMPERATURE: 98 F | OXYGEN SATURATION: 95 % | HEART RATE: 72 BPM | SYSTOLIC BLOOD PRESSURE: 156 MMHG | WEIGHT: 169.98 LBS | HEIGHT: 65 IN | DIASTOLIC BLOOD PRESSURE: 70 MMHG

## 2023-12-10 VITALS
OXYGEN SATURATION: 96 % | SYSTOLIC BLOOD PRESSURE: 162 MMHG | RESPIRATION RATE: 17 BRPM | DIASTOLIC BLOOD PRESSURE: 76 MMHG | HEART RATE: 73 BPM

## 2023-12-10 DIAGNOSIS — Z98.89 OTHER SPECIFIED POSTPROCEDURAL STATES: Chronic | ICD-10-CM

## 2023-12-10 PROCEDURE — 99285 EMERGENCY DEPT VISIT HI MDM: CPT

## 2023-12-10 PROCEDURE — 99284 EMERGENCY DEPT VISIT MOD MDM: CPT | Mod: 25

## 2023-12-10 PROCEDURE — 73610 X-RAY EXAM OF ANKLE: CPT

## 2023-12-10 PROCEDURE — 73610 X-RAY EXAM OF ANKLE: CPT | Mod: 26,RT

## 2023-12-10 PROCEDURE — 93971 EXTREMITY STUDY: CPT

## 2023-12-10 PROCEDURE — 73620 X-RAY EXAM OF FOOT: CPT | Mod: 26,RT

## 2023-12-10 PROCEDURE — 73620 X-RAY EXAM OF FOOT: CPT

## 2023-12-10 PROCEDURE — 93971 EXTREMITY STUDY: CPT | Mod: 26,RT

## 2023-12-10 RX ORDER — CEPHALEXIN 500 MG
1 CAPSULE ORAL
Qty: 20 | Refills: 0
Start: 2023-12-10 | End: 2023-12-19

## 2023-12-10 NOTE — ED PROVIDER NOTE - PATIENT PORTAL LINK FT
You can access the FollowMyHealth Patient Portal offered by [facility] by registering at the following website: [email or web address]. By joining [employer]’s FollowMyHealth portal, you will also be able to view your health information using other applications (apps) compatible with our system. You can access the FollowMyHealth Patient Portal offered by [facility] by registering at the following website: [email or web address]. By joining [employer]’s FollowMyHealth portal, you will also be able to view your health information using other applications (apps) compatible with our system.

## 2023-12-10 NOTE — ED PROVIDER NOTE - OBJECTIVE STATEMENT
78M PMHx of copd asthma htn presenting with right foot pain and right leg swelling today. Describes mild right lateral foot swelling, mild pain. Twisted his ankle about a few days ago. Went to outside  and told to come to the ED for DVT study for enlarged right calf.     Denies any chest pain, abdominal pain, shortness of breath, nausea/vomiting, headaches, fevers, chills,   weakness,     subjective neurological deficits. Denies recent travel, recent surgery, immobility,   hemoptysis, hormone use, known personal cancer history, or personal/family history of blood clots.

## 2023-12-10 NOTE — CHART NOTE - NSCHARTNOTEFT_GEN_A_CORE
SW met with the pt and family at [facility] to assess for social Work needs and discuss home assessment of safety,  Pt is an 77 y/o male presented to ED for foot pain   Emergency Department Social Work Geriatric Initial Assessment    Cognitive Mental Status -Alert and oriented  Primary Caregiver Information –Wife, Domi Jain [phone]  Emergency Contact Information –Wife, Domi Jain [phone]  Primary Care Physician / Specialists -Dr. Ayaz Goss [phone]  Functional Status Prior to ED Visit - Fully independent in all activities.  Family and Social Support –Strong family and friends support  Living Arrangement -Lives in a private house with his wife.  Services Present on Admission –Pt was able to mange all necessary tasks with the support of the family.  Assistive Devices (Durable Medical Equipment) –None  Barriers to obtain medications -None identified  Barriers to attend medical appointments -None  ISAR Score – 1 non significant.  Pt is aware of the some of the basic precautionary measures.   Advanced Directives – none  Follow up care – Attending has recommend for follow with the primary and pt wants to schedule, declined SW assistance.  Discharge Transportation – family  Summary/Recommendations/Referrals -Pt is feeling safe at  home and has no safety concerns, pt declined SW offer to assist with HHA.  Worker did not identify any other concerns, continues to be available for assistance. SW met with the pt and family at [facility] to assess for social Work needs and discuss home assessment of safety,  Pt is an 77 y/o male presented to ED for foot pain   Emergency Department Social Work Geriatric Initial Assessment    Cognitive Mental Status -Alert and oriented  Primary Caregiver Information –Wife, Domi Jain [phone]  Emergency Contact Information –Wife, Domi Jain [phone]  Primary Care Physician / Specialists -Dr. Ayaz Goss [phone]  Functional Status Prior to ED Visit - Fully independent in all activities.  Family and Social Support –Strong family and friends support  Living Arrangement -Lives in a private house with his wife.  Services Present on Admission –Pt was able to mange all necessary tasks with the support of the family.  Assistive Devices (Durable Medical Equipment) –None  Barriers to obtain medications -None identified  Barriers to attend medical appointments -None  ISAR Score – 1 non significant.  Pt is aware of the some of the basic precautionary measures.   Advanced Directives – none  Follow up care – Attending has recommend for follow with the primary and pt wants to schedule, declined SW assistance.  Discharge Transportation – family  Summary/Recommendations/Referrals -Pt is feeling safe at  home and has no safety concerns, pt declined SW offer to assist with HHA.  Worker did not identify any other concerns, continues to be available for assistance.

## 2023-12-10 NOTE — ED ADULT NURSE NOTE - NS ED NOTE ABUSE RESPONSE YN
Post-Care Instructions: I reviewed with the patient in detail post-care instructions. Patient should stay away from the sun and wear sun protection until treated areas are fully healed. Yes

## 2023-12-10 NOTE — ED PROVIDER NOTE - NSDCPRINTRESULTS_ED_ALL_ED
Called patient over the phone regarding lab results. Informed that LFT's improved, hepatitis panel was negative, A1c improving. Instructed to continue on regimen we discussed on our visit yesterday.    Patient requests all Lab, Cardiology, and Radiology Results on their Discharge Instructions

## 2023-12-10 NOTE — ED ADULT NURSE NOTE - OBJECTIVE STATEMENT
Sent by urgent care to r/o DVT. Pt c/o RLE/ right foot pain, swelling and redness. denies any cp, sob, n/v/d, fevers.

## 2023-12-10 NOTE — ED PROVIDER NOTE - CLINICAL SUMMARY MEDICAL DECISION MAKING FREE TEXT BOX
78M PMHx of copd asthma htn presenting with right foot pain and right leg swelling today. Describes mild right lateral foot swelling, mild pain. Twisted his ankle about a few days ago. Went to outside  and told to come to the ED for DVT study for enlarged right calf.     Denies any chest pain, abdominal pain, shortness of breath, nausea/vomiting, headaches, fevers, chills,   weakness,     subjective neurological deficits. Denies recent travel, recent surgery, immobility,   hemoptysis, hormone use, known personal cancer history, or personal/family history of blood clots.     Exam and history concerning for musculoskeletal pain vs fracture. There is no evidence of deformity or joint instability. There are no open wounds overlying the affected site.   DDX: Rule out associated traumatic injuries, abrasions, lacerations, head trauma, neck trauma, open fractures.  PLAN:   -analgesia, ice packs & re-assess  -X-ray negative for fx, DVT negative, likely sprain, however, patient and family member would like a rx for cellulitis if a rash does appear. Rx keflex. given. to be filled if rash appears.

## 2023-12-10 NOTE — ED PROVIDER NOTE - PHYSICAL EXAMINATION
VITAL SIGNS: I have reviewed nursing notes and confirm.   GEN: Well-developed; well-nourished; in no acute distress. Speaking full sentences.  SKIN: Warm, pink, no rash, no diaphoresis, no cyanosis, well perfused.   HEAD: Normocephalic; atraumatic. No scalp lacerations, no abrasions.  NECK: Supple; non tender.   EYES: Pupils 3mm equal, round, reactive to light and accomodation, conjunctiva and sclera clear  ENT: No nasal discharge; airway clear. Trachea is midline.   MSK: (+) RIGHT leg calf larger than LEFT, no ttp, no erythema, no lymphangitis,   NEURO: Alert & oriented x 3, Grossly unremarkable. Sensory and motor intact throughout. No focal deficits. Gait: Fluid. Normal speech and coordination.

## 2023-12-10 NOTE — ED ADULT NURSE NOTE - NSFALLUNIVINTERV_ED_ALL_ED
Bed/Stretcher in lowest position, wheels locked, appropriate side rails in place/Call bell, personal items and telephone in reach/Instruct patient to call for assistance before getting out of bed/chair/stretcher/Non-slip footwear applied when patient is off stretcher/[place] to call system/Physically safe environment - no spills, clutter or unnecessary equipment/Purposeful proactive rounding/Room/bathroom lighting operational, light cord in reach Bed/Stretcher in lowest position, wheels locked, appropriate side rails in place/Call bell, personal items and telephone in reach/Instruct patient to call for assistance before getting out of bed/chair/stretcher/Non-slip footwear applied when patient is off stretcher/[place] to call system/Physically safe environment - no spills, clutter or unnecessary equipment/Purposeful proactive rounding/Room/bathroom lighting operational, light cord in reach

## 2023-12-10 NOTE — ED PROVIDER NOTE - NSFOLLOWUPINSTRUCTIONS_ED_ALL_ED_FT
Rest, drink plenty of fluids.  Advance activity as tolerated.  Continue all previously prescribed medications as directed.  Follow up with your PMD 2-3 days and bring copies of your results.  Return to the ER for worsening symptoms, fevers, increased swelling, increased pain, chest pain, weakness, numbness/tingling, or new concerning symptoms.     Take acetaminophen 650 mg orally every 6-8 hours for pain control as needed. Please do not exceed 4,000 mg of acetaminophen during a 24 hours period. Acetaminophen can be found in many over-the-counter cold medications as well as opioid medications that may be given for pain.    Take ibuprofen (also known as MOTRIN or ADVIL) 400 mg orally every 6-8 hours for pain control as needed with food to avoid an upset stomach. Ibuprofen can be found in many over-the-counter medications. Please do not take ibuprofen if you have a bleeding disorder, stomach or gastrointestinal ulcer, or liver disease.    If needed, you can alternate these medications so that you can take one medication every 3 hours. For example, at noon take ibuprofen, then at 3PM take acetaminophen, then at 6PM take ibuprofen.

## 2023-12-15 NOTE — CHART NOTE - NSCHARTNOTEFT_GEN_A_CORE
78 y o male presenting to the ED on 12/10 complaining of foot pain/injury.  SW made a courtesy to call assist with scheduling the recommended pcp appointment.  Per HIANIKA, the patient has an existing appointment with Dr. Jacome on 2/2/24.  The patient reported feeling much better and declined assistance with scheduling an earlier pcp appointment.

## 2023-12-18 ENCOUNTER — APPOINTMENT (OUTPATIENT)
Dept: CT IMAGING | Facility: HOSPITAL | Age: 78
End: 2023-12-18
Payer: MEDICARE

## 2023-12-18 ENCOUNTER — OUTPATIENT (OUTPATIENT)
Dept: OUTPATIENT SERVICES | Facility: HOSPITAL | Age: 78
LOS: 1 days | End: 2023-12-18
Payer: MEDICARE

## 2023-12-18 DIAGNOSIS — Z98.89 OTHER SPECIFIED POSTPROCEDURAL STATES: Chronic | ICD-10-CM

## 2023-12-18 DIAGNOSIS — R91.1 SOLITARY PULMONARY NODULE: ICD-10-CM

## 2023-12-18 PROCEDURE — 71250 CT THORAX DX C-: CPT

## 2023-12-18 PROCEDURE — 71250 CT THORAX DX C-: CPT | Mod: 26,MH

## 2024-02-02 ENCOUNTER — APPOINTMENT (OUTPATIENT)
Dept: INTERNAL MEDICINE | Facility: CLINIC | Age: 79
End: 2024-02-02
Payer: MEDICARE

## 2024-02-02 VITALS
TEMPERATURE: 96.9 F | HEIGHT: 67 IN | RESPIRATION RATE: 16 BRPM | BODY MASS INDEX: 25.9 KG/M2 | OXYGEN SATURATION: 96 % | HEART RATE: 85 BPM | DIASTOLIC BLOOD PRESSURE: 70 MMHG | SYSTOLIC BLOOD PRESSURE: 128 MMHG | WEIGHT: 165 LBS

## 2024-02-02 DIAGNOSIS — J61 PNEUMOCONIOSIS DUE TO ASBESTOS AND OTHER MINERAL FIBERS: ICD-10-CM

## 2024-02-02 DIAGNOSIS — J45.909 UNSPECIFIED ASTHMA, UNCOMPLICATED: ICD-10-CM

## 2024-02-02 DIAGNOSIS — K21.9 GASTRO-ESOPHAGEAL REFLUX DISEASE W/OUT ESOPHAGITIS: ICD-10-CM

## 2024-02-02 PROCEDURE — 99213 OFFICE O/P EST LOW 20 MIN: CPT

## 2024-02-02 NOTE — HISTORY OF PRESENT ILLNESS
[de-identified] : Patient is here for follow-up for his asthma..  His symptoms are controlled on the current medication regimen

## 2024-02-02 NOTE — PLAN
[FreeTextEntry1] : Continue nebulizer treatment as prescribed. Discussed findings of CAT scan showing new opacities.  Patient needs a follow-up with his pulmonologist. Continue valsartan for blood pressure control.

## 2024-02-24 ENCOUNTER — TRANSCRIPTION ENCOUNTER (OUTPATIENT)
Age: 79
End: 2024-02-24

## 2024-04-07 ENCOUNTER — NON-APPOINTMENT (OUTPATIENT)
Age: 79
End: 2024-04-07

## 2024-06-28 NOTE — ED PROVIDER NOTE - NEUROLOGICAL, MLM
See Flowsheet for immunotherapy administration. Patient waited in clinic 30 min for observation. Pt had epi pen on hand.    
Alert and oriented, no focal deficits, no motor or sensory deficits.

## 2024-07-25 ENCOUNTER — RX RENEWAL (OUTPATIENT)
Age: 79
End: 2024-07-25

## 2024-08-02 ENCOUNTER — APPOINTMENT (OUTPATIENT)
Dept: INTERNAL MEDICINE | Facility: CLINIC | Age: 79
End: 2024-08-02
Payer: MEDICARE

## 2024-08-02 VITALS
DIASTOLIC BLOOD PRESSURE: 80 MMHG | SYSTOLIC BLOOD PRESSURE: 154 MMHG | WEIGHT: 165 LBS | RESPIRATION RATE: 17 BRPM | HEART RATE: 67 BPM | BODY MASS INDEX: 25.9 KG/M2 | HEIGHT: 67 IN | OXYGEN SATURATION: 97 %

## 2024-08-02 DIAGNOSIS — J45.909 UNSPECIFIED ASTHMA, UNCOMPLICATED: ICD-10-CM

## 2024-08-02 DIAGNOSIS — I10 ESSENTIAL (PRIMARY) HYPERTENSION: ICD-10-CM

## 2024-08-02 DIAGNOSIS — J43.8 OTHER EMPHYSEMA: ICD-10-CM

## 2024-08-02 PROCEDURE — 99213 OFFICE O/P EST LOW 20 MIN: CPT

## 2024-08-02 NOTE — PLAN
[FreeTextEntry1] : Will check blood work and have a follow-up CPE in 6 weeks. Will schedule patient for formal memory testing. Discussed stopping Protonix.

## 2024-08-02 NOTE — HISTORY OF PRESENT ILLNESS
[FreeTextEntry8] : Patient accompanied by his daughter.  She is expressing concerns over worsening of his memory.  He does not remember items in a recipe that he has been cooking for his whole life.  He is able to care for himself and perform his ADLs without difficulty.  He is stating he does not believe he has a problem.  He is no longer driving.  His daughter is also concerned that he sits all day and does not get out and exercise. [Family Member] : family member

## 2024-08-05 LAB
24R-OH-CALCIDIOL SERPL-MCNC: 40.7 PG/ML
ALBUMIN SERPL ELPH-MCNC: 4.3 G/DL
ALP BLD-CCNC: 105 U/L
ALT SERPL-CCNC: 8 U/L
ANION GAP SERPL CALC-SCNC: 11 MMOL/L
AST SERPL-CCNC: 17 U/L
BILIRUB SERPL-MCNC: 0.5 MG/DL
BUN SERPL-MCNC: 13 MG/DL
CALCIUM SERPL-MCNC: 9.3 MG/DL
CHLORIDE SERPL-SCNC: 105 MMOL/L
CO2 SERPL-SCNC: 25 MMOL/L
CREAT SERPL-MCNC: 1.11 MG/DL
EGFR: 67 ML/MIN/1.73M2
ESTIMATED AVERAGE GLUCOSE: 131 MG/DL
FERRITIN SERPL-MCNC: 210 NG/ML
GLUCOSE SERPL-MCNC: 106 MG/DL
HBA1C MFR BLD HPLC: 6.2 %
HCT VFR BLD CALC: 46.6 %
HGB BLD-MCNC: 15.2 G/DL
IRON SATN MFR SERPL: 36 %
IRON SERPL-MCNC: 88 UG/DL
MAGNESIUM SERPL-MCNC: 2 MG/DL
MCHC RBC-ENTMCNC: 29.5 PG
MCHC RBC-ENTMCNC: 32.6 GM/DL
MCV RBC AUTO: 90.3 FL
PHOSPHATE SERPL-MCNC: 2.7 MG/DL
PLATELET # BLD AUTO: 273 K/UL
POTASSIUM SERPL-SCNC: 4.8 MMOL/L
PROT SERPL-MCNC: 6.5 G/DL
RBC # BLD: 5.16 M/UL
RBC # FLD: 13.9 %
SODIUM SERPL-SCNC: 140 MMOL/L
TIBC SERPL-MCNC: 243 UG/DL
TSH SERPL-ACNC: 3.66 UIU/ML
UIBC SERPL-MCNC: 154 UG/DL
VIT B12 SERPL-MCNC: 413 PG/ML
WBC # FLD AUTO: 7.6 K/UL

## 2024-09-06 ENCOUNTER — APPOINTMENT (OUTPATIENT)
Dept: INTERNAL MEDICINE | Facility: CLINIC | Age: 79
End: 2024-09-06
Payer: MEDICARE

## 2024-09-06 VITALS
HEART RATE: 65 BPM | BODY MASS INDEX: 26.37 KG/M2 | RESPIRATION RATE: 16 BRPM | WEIGHT: 168 LBS | TEMPERATURE: 97 F | OXYGEN SATURATION: 98 % | DIASTOLIC BLOOD PRESSURE: 82 MMHG | SYSTOLIC BLOOD PRESSURE: 158 MMHG | HEIGHT: 67 IN

## 2024-09-06 DIAGNOSIS — J45.991 COUGH VARIANT ASTHMA: ICD-10-CM

## 2024-09-06 DIAGNOSIS — G31.84 MILD COGNITIVE IMPAIRMENT, SO STATED: ICD-10-CM

## 2024-09-06 DIAGNOSIS — E11.9 TYPE 2 DIABETES MELLITUS W/OUT COMPLICATIONS: ICD-10-CM

## 2024-09-06 DIAGNOSIS — J45.909 UNSPECIFIED ASTHMA, UNCOMPLICATED: ICD-10-CM

## 2024-09-06 PROCEDURE — 99214 OFFICE O/P EST MOD 30 MIN: CPT

## 2024-09-06 RX ORDER — DONEPEZIL HYDROCHLORIDE 10 MG/1
10 TABLET, ORALLY DISINTEGRATING ORAL
Refills: 0 | Status: ACTIVE | COMMUNITY
Start: 2024-09-06

## 2024-09-06 RX ORDER — DONEPEZIL HYDROCHLORIDE 5 MG/1
5 TABLET ORAL
Qty: 90 | Refills: 3 | Status: ACTIVE | COMMUNITY
Start: 2024-09-06

## 2024-09-06 NOTE — HISTORY OF PRESENT ILLNESS
[de-identified] : Patient here for follow-up for his mild memory issues. He went to a neurologist had neuropsych testing which showed that he has some mild cognitive impairment.  He has started on benazepril without any complication. He otherwise feels good without any problems.

## 2024-09-06 NOTE — PLAN
[FreeTextEntry1] : Donepezil to be increased to 10 mg at night. Patient followed by neurologist. He denies any increased memory loss.  Follow-up in 3 months.

## 2024-10-05 ENCOUNTER — NON-APPOINTMENT (OUTPATIENT)
Age: 79
End: 2024-10-05

## 2024-10-12 ENCOUNTER — NON-APPOINTMENT (OUTPATIENT)
Age: 79
End: 2024-10-12

## 2024-12-06 ENCOUNTER — APPOINTMENT (OUTPATIENT)
Dept: INTERNAL MEDICINE | Facility: CLINIC | Age: 79
End: 2024-12-06

## 2024-12-11 ENCOUNTER — RX RENEWAL (OUTPATIENT)
Age: 79
End: 2024-12-11

## 2024-12-12 ENCOUNTER — NON-APPOINTMENT (OUTPATIENT)
Age: 79
End: 2024-12-12

## 2025-04-10 ENCOUNTER — INPATIENT (INPATIENT)
Facility: HOSPITAL | Age: 80
LOS: 3 days | Discharge: NURSING, REHAB OR LONG-TERM CARE | DRG: 522 | End: 2025-04-14
Attending: HOSPITALIST | Admitting: HOSPITALIST
Payer: MEDICARE

## 2025-04-10 VITALS
WEIGHT: 169.98 LBS | OXYGEN SATURATION: 98 % | HEIGHT: 66 IN | SYSTOLIC BLOOD PRESSURE: 131 MMHG | RESPIRATION RATE: 18 BRPM | HEART RATE: 67 BPM | DIASTOLIC BLOOD PRESSURE: 71 MMHG | TEMPERATURE: 98 F

## 2025-04-10 DIAGNOSIS — Z98.89 OTHER SPECIFIED POSTPROCEDURAL STATES: Chronic | ICD-10-CM

## 2025-04-10 DIAGNOSIS — S72.009A FRACTURE OF UNSPECIFIED PART OF NECK OF UNSPECIFIED FEMUR, INITIAL ENCOUNTER FOR CLOSED FRACTURE: ICD-10-CM

## 2025-04-10 LAB
ALBUMIN SERPL ELPH-MCNC: 3.4 G/DL — SIGNIFICANT CHANGE UP (ref 3.3–5)
ALBUMIN SERPL ELPH-MCNC: 3.4 G/DL — SIGNIFICANT CHANGE UP (ref 3.3–5)
ALP SERPL-CCNC: 88 U/L — SIGNIFICANT CHANGE UP (ref 40–120)
ALT FLD-CCNC: 17 U/L — SIGNIFICANT CHANGE UP (ref 10–45)
ANION GAP SERPL CALC-SCNC: 6 MMOL/L — SIGNIFICANT CHANGE UP (ref 5–17)
APTT BLD: 31.1 SEC — SIGNIFICANT CHANGE UP (ref 24.5–35.6)
AST SERPL-CCNC: 21 U/L — SIGNIFICANT CHANGE UP (ref 10–40)
BASOPHILS # BLD AUTO: 0.04 K/UL — SIGNIFICANT CHANGE UP (ref 0–0.2)
BASOPHILS NFR BLD AUTO: 0.6 % — SIGNIFICANT CHANGE UP (ref 0–2)
BILIRUB SERPL-MCNC: 0.5 MG/DL — SIGNIFICANT CHANGE UP (ref 0.2–1.2)
BLD GP AB SCN SERPL QL: SIGNIFICANT CHANGE UP
BUN SERPL-MCNC: 16 MG/DL — SIGNIFICANT CHANGE UP (ref 7–23)
CALCIUM SERPL-MCNC: 8.4 MG/DL — SIGNIFICANT CHANGE UP (ref 8.4–10.5)
CHLORIDE SERPL-SCNC: 104 MMOL/L — SIGNIFICANT CHANGE UP (ref 96–108)
CO2 SERPL-SCNC: 28 MMOL/L — SIGNIFICANT CHANGE UP (ref 22–31)
CREAT SERPL-MCNC: 1.12 MG/DL — SIGNIFICANT CHANGE UP (ref 0.5–1.3)
EGFR: 66 ML/MIN/1.73M2 — SIGNIFICANT CHANGE UP
EGFR: 66 ML/MIN/1.73M2 — SIGNIFICANT CHANGE UP
EOSINOPHIL # BLD AUTO: 0.19 K/UL — SIGNIFICANT CHANGE UP (ref 0–0.5)
EOSINOPHIL NFR BLD AUTO: 2.7 % — SIGNIFICANT CHANGE UP (ref 0–6)
GLUCOSE SERPL-MCNC: 205 MG/DL — HIGH (ref 70–99)
HCT VFR BLD CALC: 41.3 % — SIGNIFICANT CHANGE UP (ref 39–50)
HGB BLD-MCNC: 14 G/DL — SIGNIFICANT CHANGE UP (ref 13–17)
IMM GRANULOCYTES NFR BLD AUTO: 0.9 % — SIGNIFICANT CHANGE UP (ref 0–0.9)
INR BLD: 1 RATIO — SIGNIFICANT CHANGE UP (ref 0.85–1.16)
LYMPHOCYTES # BLD AUTO: 1.2 K/UL — SIGNIFICANT CHANGE UP (ref 1–3.3)
LYMPHOCYTES # BLD AUTO: 17.3 % — SIGNIFICANT CHANGE UP (ref 13–44)
MCHC RBC-ENTMCNC: 29.9 PG — SIGNIFICANT CHANGE UP (ref 27–34)
MCHC RBC-ENTMCNC: 33.9 G/DL — SIGNIFICANT CHANGE UP (ref 32–36)
MCV RBC AUTO: 88.1 FL — SIGNIFICANT CHANGE UP (ref 80–100)
MONOCYTES # BLD AUTO: 0.34 K/UL — SIGNIFICANT CHANGE UP (ref 0–0.9)
MONOCYTES NFR BLD AUTO: 4.9 % — SIGNIFICANT CHANGE UP (ref 2–14)
NEUTROPHILS # BLD AUTO: 5.12 K/UL — SIGNIFICANT CHANGE UP (ref 1.8–7.4)
NEUTROPHILS NFR BLD AUTO: 73.6 % — SIGNIFICANT CHANGE UP (ref 43–77)
NRBC BLD AUTO-RTO: 0 /100 WBCS — SIGNIFICANT CHANGE UP (ref 0–0)
PLATELET # BLD AUTO: 246 K/UL — SIGNIFICANT CHANGE UP (ref 150–400)
POTASSIUM SERPL-MCNC: 3.9 MMOL/L — SIGNIFICANT CHANGE UP (ref 3.5–5.3)
POTASSIUM SERPL-SCNC: 3.9 MMOL/L — SIGNIFICANT CHANGE UP (ref 3.5–5.3)
PROT SERPL-MCNC: 6.3 G/DL — SIGNIFICANT CHANGE UP (ref 6–8.3)
PROTHROM AB SERPL-ACNC: 11.8 SEC — SIGNIFICANT CHANGE UP (ref 9.9–13.4)
RBC # BLD: 4.69 M/UL — SIGNIFICANT CHANGE UP (ref 4.2–5.8)
RBC # FLD: 13.3 % — SIGNIFICANT CHANGE UP (ref 10.3–14.5)
SODIUM SERPL-SCNC: 138 MMOL/L — SIGNIFICANT CHANGE UP (ref 135–145)
WBC # BLD: 6.95 K/UL — SIGNIFICANT CHANGE UP (ref 3.8–10.5)
WBC # FLD AUTO: 6.95 K/UL — SIGNIFICANT CHANGE UP (ref 3.8–10.5)

## 2025-04-10 PROCEDURE — 71045 X-RAY EXAM CHEST 1 VIEW: CPT | Mod: 26

## 2025-04-10 PROCEDURE — 73552 X-RAY EXAM OF FEMUR 2/>: CPT | Mod: 26,LT

## 2025-04-10 PROCEDURE — 99285 EMERGENCY DEPT VISIT HI MDM: CPT

## 2025-04-10 PROCEDURE — 70360 X-RAY EXAM OF NECK: CPT | Mod: 26

## 2025-04-10 PROCEDURE — 73502 X-RAY EXAM HIP UNI 2-3 VIEWS: CPT | Mod: 26,LT

## 2025-04-10 PROCEDURE — 99223 1ST HOSP IP/OBS HIGH 75: CPT

## 2025-04-10 RX ORDER — ONDANSETRON HCL/PF 4 MG/2 ML
4 VIAL (ML) INJECTION EVERY 8 HOURS
Refills: 0 | Status: DISCONTINUED | OUTPATIENT
Start: 2025-04-10 | End: 2025-04-14

## 2025-04-10 RX ORDER — ALBUTEROL SULFATE 2.5 MG/3ML
2 VIAL, NEBULIZER (ML) INHALATION EVERY 6 HOURS
Refills: 0 | Status: DISCONTINUED | OUTPATIENT
Start: 2025-04-10 | End: 2025-04-14

## 2025-04-10 RX ORDER — ENOXAPARIN SODIUM 100 MG/ML
40 INJECTION SUBCUTANEOUS EVERY 24 HOURS
Refills: 0 | Status: DISCONTINUED | OUTPATIENT
Start: 2025-04-10 | End: 2025-04-14

## 2025-04-10 RX ORDER — ACETAMINOPHEN 500 MG/5ML
650 LIQUID (ML) ORAL EVERY 6 HOURS
Refills: 0 | Status: DISCONTINUED | OUTPATIENT
Start: 2025-04-10 | End: 2025-04-13

## 2025-04-10 RX ORDER — BUDESONIDE 90 UG/1
0.5 AEROSOL, POWDER RESPIRATORY (INHALATION)
Refills: 0 | Status: DISCONTINUED | OUTPATIENT
Start: 2025-04-10 | End: 2025-04-14

## 2025-04-10 RX ADMIN — Medication 4 MILLIGRAM(S): at 14:05

## 2025-04-10 RX ADMIN — BUDESONIDE 0.5 MILLIGRAM(S): 90 AEROSOL, POWDER RESPIRATORY (INHALATION) at 21:35

## 2025-04-10 RX ADMIN — Medication 40 MILLIGRAM(S): at 21:18

## 2025-04-10 RX ADMIN — Medication 4 MILLIGRAM(S): at 12:08

## 2025-04-10 RX ADMIN — Medication 1000 MILLILITER(S): at 12:08

## 2025-04-10 NOTE — CONSULT NOTE ADULT - SUBJECTIVE AND OBJECTIVE BOX
HPI:  80yo male w/ hx of Asthma, HTN, presented to the ED accompanied by his wife s/p fall in the driveway while using a leaf blower to clean the driveway. Pt not sure how he lost his balance but landed on his left side w/o LOC. Pt's wife was present and states herself she is not sure how he fell as well. But no LOC or syncopal episode. Pt was unable to move nor was wife able to help him up due to pain in her left leg. Pt denies prior to falling any episode of cp, palpitations, sob, abd pain, N/V, fever, chills.  (10 Apr 2025 13:20)    Orthopedics, Dr Casanova consulted for further evaluation of left femoral neck fx and surgical intervention.      PAST MEDICAL & SURGICAL HISTORY:  Asthma      COPD (chronic obstructive pulmonary disease)      Environmental lung disease      HTN (hypertension)      GERD (gastroesophageal reflux disease)      S/P hernia repair      S/P colonoscopy      MEDICATIONS  (STANDING):  buDESOnide    Inhalation Suspension 0.5 milliGRAM(s) Inhalation two times a day  enoxaparin Injectable 40 milliGRAM(s) SubCutaneous every 24 hours  pantoprazole    Tablet 40 milliGRAM(s) Oral before breakfast  sodium chloride 0.9%. 1000 milliLiter(s) (125 mL/Hr) IV Continuous <Continuous>  valsartan 40 milliGRAM(s) Oral daily    MEDICATIONS  (PRN):  acetaminophen     Tablet .. 650 milliGRAM(s) Oral every 6 hours PRN Temp greater or equal to 38C (100.4F), Mild Pain (1 - 3)  albuterol    90 MICROgram(s) HFA Inhaler 2 Puff(s) Inhalation every 6 hours PRN Shortness of Breath  morphine  - Injectable 1 milliGRAM(s) IV Push every 2 hours PRN Severe Pain (7 - 10)  ondansetron Injectable 4 milliGRAM(s) IV Push every 8 hours PRN Nausea and/or Vomiting      Allergies    No Known Allergies    Intolerances    Social History:  lives w/ wife  Former smoker 40 years ago  No EtOH use (10 Apr 2025 13:20)    PE:  Vital Signs Last 24 Hrs  T(C): 36.6 (10 Apr 2025 11:43), Max: 36.6 (10 Apr 2025 11:43)  T(F): 97.9 (10 Apr 2025 11:43), Max: 97.9 (10 Apr 2025 11:43)  HR: 67 (10 Apr 2025 11:43) (67 - 67)  BP: 131/71 (10 Apr 2025 11:43) (131/71 - 131/71)  BP(mean): --  RR: 18 (10 Apr 2025 11:43) (18 - 18)  SpO2: 98% (10 Apr 2025 11:43) (98% - 98%)    Parameters below as of 10 Apr 2025 11:43  Patient On (Oxygen Delivery Method): room air    CONSTITUTIONAL: well-appearing, non-toxic, NAD  	SKIN: Warm dry, normal skin turgor  	HEAD: NCAT  	EYES: EOMI, PERRLA, no scleral icterus  	ENT: Moist mucous membranes, normal pharynx   	CARD: RRR, no murmurs, rubs or gallops  	RESP: clear to ausculation b/l.  No rales, rhonchi, or wheezing.  	ABD: soft, + BS, non-tender, non-distended, no rebound or guarding. No CVA tenderness  	EXT: Left hip tenderness, LLE externally rotated, shortened  + DP pulses sensory intact  	NEURO: normal motor. normal sensory. no facial droop, no slurred speech                               14.0   6.95  )-----------( 246      ( 10 Apr 2025 12:11 )             41.3       04-10    138  |  104  |  16  ----------------------------<  205[H]  3.9   |  28  |  1.12    Ca    8.4      10 Apr 2025 12:11    TPro  6.3  /  Alb  3.4  /  TBili  0.5  /  DBili  x   /  AST  21  /  ALT  17  /  AlkPhos  88  04-10      PT/INR - ( 10 Apr 2025 12:11 )   PT: 11.8 sec;   INR: 1.00 ratio         PTT - ( 10 Apr 2025 12:11 )  PTT:31.1 sec    ABO RH Interpretation: O POS (04.10.25 @ 12:11) Antibody Screen: NEG (04.10.25 @ 12:11) < from: Xray Femur 2 Views, Left (04.10.25 @ 12:28) >  ACC: [id number] EXAM:  XR FEMUR 2 VIEWS LT   ORDERED BY: TARA VARELA     ACC: [id number] EXAM:  XR CHEST PORTABLE URGENT 1V   ORDERED BY: TARA VARELA     ACC: [id number] EXAM:  XR HIP WITH PELV 2-3V LT   ORDERED BY: TARA VARELA     PROCEDURE DATE:  04/10/2025          INTERPRETATION:  Left hip with pelvis, left femur, and chest. Patient had   a fall.    Left hip with pelvis. 3 views. 4 images.    There is lower lumbar degenerative loss of disc height.    There are mild bilateralhip degenerative findings.    There is a neck fracture of the left hip with displacement.    Left femur. 4 views. 5 images. There is a knee replacement with grossly   normal alignment. No further fracture.    AP chest on April 10, 2025 at 12:07 PM.    Heart normal for projection.    Lungs are clear. No fracture or change from October 6, 2024.    IMPRESSION:  There is a neck fracture of the left femur with displacement. No acute   chest finding.    --- End of Report ---            MAHAD HANSON MD; Attending Radiologist  This document has been electronically signed. Apr 10 2025 12:31PM    < end of copied text >  < from: Xray Hip w/ Pelvis 2 or 3 Views, Left (04.10.25 @ 12:28) >    ACC: [id number] EXAM:  XR FEMUR 2 VIEWS LT   ORDERED BY: TARA VARELA     ACC: [id number] EXAM:  XR CHEST PORTABLE URGENT 1V   ORDERED BY: TARA VARELA     ACC: [id number] EXAM:  XR HIP WITH PELV 2-3V LT   ORDERED BY: TARA VARELA     PROCEDURE DATE:  04/10/2025          INTERPRETATION:  Left hip with pelvis, left femur, and chest. Patient had   a fall.    Left hip with pelvis. 3 views. 4 images.    There is lower lumbar degenerative loss of disc height.    There are mild bilateralhip degenerative findings.    There is a neck fracture of the left hip with displacement.    Left femur. 4 views. 5 images. There is a knee replacement with grossly   normal alignment. No further fracture.    AP chest on April 10, 2025 at 12:07 PM.    Heart normal for projection.    Lungs are clear. No fracture or change from October 6, 2024.    IMPRESSION:  There is a neck fracture of the left femur with displacement. No acute   chest finding.    --- End of Report ---            MAHAD HANSON MD; Attending Radiologist  This document has been electronically signed. Apr 10 2025 12:31PM    < end of copied text >     HPI:  78yo male w/ hx of Asthma, HTN, presented to the ED accompanied by his wife s/p fall in the driveway while using a leaf blower to clean the driveway. Pt not sure how he lost his balance but landed on his left side w/o LOC. Pt's wife was present and states herself she is not sure how he fell as well. But no LOC or syncopal episode. Pt was unable to move nor was wife able to help him up due to pain in her left leg. Pt denies prior to falling any episode of cp, palpitations, sob, abd pain, N/V, fever, chills.  (10 Apr 2025 13:20)    Orthopedics, Dr Casanova consulted for further evaluation of left femoral neck fx and surgical intervention.      PAST MEDICAL & SURGICAL HISTORY:  Asthma      COPD (chronic obstructive pulmonary disease)      Environmental lung disease      HTN (hypertension)      GERD (gastroesophageal reflux disease)      S/P hernia repair      S/P colonoscopy      MEDICATIONS  (STANDING):  buDESOnide    Inhalation Suspension 0.5 milliGRAM(s) Inhalation two times a day  enoxaparin Injectable 40 milliGRAM(s) SubCutaneous every 24 hours  pantoprazole    Tablet 40 milliGRAM(s) Oral before breakfast  sodium chloride 0.9%. 1000 milliLiter(s) (125 mL/Hr) IV Continuous <Continuous>  valsartan 40 milliGRAM(s) Oral daily    MEDICATIONS  (PRN):  acetaminophen     Tablet .. 650 milliGRAM(s) Oral every 6 hours PRN Temp greater or equal to 38C (100.4F), Mild Pain (1 - 3)  albuterol    90 MICROgram(s) HFA Inhaler 2 Puff(s) Inhalation every 6 hours PRN Shortness of Breath  morphine  - Injectable 1 milliGRAM(s) IV Push every 2 hours PRN Severe Pain (7 - 10)  ondansetron Injectable 4 milliGRAM(s) IV Push every 8 hours PRN Nausea and/or Vomiting      Allergies    No Known Allergies    Intolerances    Social History:  lives w/ wife  Former smoker 40 years ago  No EtOH use (10 Apr 2025 13:20)    PE:  Vital Signs Last 24 Hrs  T(C): 36.6 (10 Apr 2025 11:43), Max: 36.6 (10 Apr 2025 11:43)  T(F): 97.9 (10 Apr 2025 11:43), Max: 97.9 (10 Apr 2025 11:43)  HR: 67 (10 Apr 2025 11:43) (67 - 67)  BP: 131/71 (10 Apr 2025 11:43) (131/71 - 131/71)  BP(mean): --  RR: 18 (10 Apr 2025 11:43) (18 - 18)  SpO2: 98% (10 Apr 2025 11:43) (98% - 98%)    Parameters below as of 10 Apr 2025 11:43  Patient On (Oxygen Delivery Method): room air    CONSTITUTIONAL: well-appearing, non-toxic, NAD  	SKIN: Warm dry, normal skin turgor  	HEAD: NCAT  	EYES: EOMI, PERRLA, no scleral icterus  	ENT: Moist mucous membranes, normal pharynx   	CARD: RRR, no murmurs, rubs or gallops  	RESP: clear to ausculation b/l.  No rales, rhonchi, or wheezing.  	ABD: soft, + BS, non-tender, non-distended, no rebound or guarding. No CVA tenderness  	EXT: Left hip tenderness, LLE externally rotated, shortened  + DP pulses +foot drop +1 strength/ minimal ROM  on dorsiflexion with gastroc contracture unable to assess on ambulation pt on bedrest   	NEURO: normal motor. normal sensory. no facial droop, no slurred speech                               14.0   6.95  )-----------( 246      ( 10 Apr 2025 12:11 )             41.3       04-10    138  |  104  |  16  ----------------------------<  205[H]  3.9   |  28  |  1.12    Ca    8.4      10 Apr 2025 12:11    TPro  6.3  /  Alb  3.4  /  TBili  0.5  /  DBili  x   /  AST  21  /  ALT  17  /  AlkPhos  88  04-10      PT/INR - ( 10 Apr 2025 12:11 )   PT: 11.8 sec;   INR: 1.00 ratio         PTT - ( 10 Apr 2025 12:11 )  PTT:31.1 sec    ABO RH Interpretation: O POS (04.10.25 @ 12:11) Antibody Screen: NEG (04.10.25 @ 12:11) < from: Xray Femur 2 Views, Left (04.10.25 @ 12:28) >  ACC: [id number] EXAM:  XR FEMUR 2 VIEWS LT   ORDERED BY: TARA VARELA     ACC: [id number] EXAM:  XR CHEST PORTABLE URGENT 1V   ORDERED BY: TARA VARELA     ACC: [id number] EXAM:  XR HIP WITH PELV 2-3V LT   ORDERED BY: TARA VARELA     PROCEDURE DATE:  04/10/2025          INTERPRETATION:  Left hip with pelvis, left femur, and chest. Patient had   a fall.    Left hip with pelvis. 3 views. 4 images.    There is lower lumbar degenerative loss of disc height.    There are mild bilateralhip degenerative findings.    There is a neck fracture of the left hip with displacement.    Left femur. 4 views. 5 images. There is a knee replacement with grossly   normal alignment. No further fracture.    AP chest on April 10, 2025 at 12:07 PM.    Heart normal for projection.    Lungs are clear. No fracture or change from October 6, 2024.    IMPRESSION:  There is a neck fracture of the left femur with displacement. No acute   chest finding.    --- End of Report ---            MAHAD HANSON MD; Attending Radiologist  This document has been electronically signed. Apr 10 2025 12:31PM    < end of copied text >  < from: Xray Hip w/ Pelvis 2 or 3 Views, Left (04.10.25 @ 12:28) >    ACC: [id number] EXAM:  XR FEMUR 2 VIEWS LT   ORDERED BY: TARA VARELA     ACC: [id number] EXAM:  XR CHEST PORTABLE URGENT 1V   ORDERED BY: TARA VARELA     ACC: [id number] EXAM:  XR HIP WITH PELV 2-3V LT   ORDERED BY: TARA VARELA     PROCEDURE DATE:  04/10/2025          INTERPRETATION:  Left hip with pelvis, left femur, and chest. Patient had   a fall.    Left hip with pelvis. 3 views. 4 images.    There is lower lumbar degenerative loss of disc height.    There are mild bilateralhip degenerative findings.    There is a neck fracture of the left hip with displacement.    Left femur. 4 views. 5 images. There is a knee replacement with grossly   normal alignment. No further fracture.    AP chest on April 10, 2025 at 12:07 PM.    Heart normal for projection.    Lungs are clear. No fracture or change from October 6, 2024.    IMPRESSION:  There is a neck fracture of the left femur with displacement. No acute   chest finding.    --- End of Report ---            MAHAD HANSON MD; Attending Radiologist  This document has been electronically signed. Apr 10 2025 12:31PM    < end of copied text >     HPI:  78yo male w/ hx of Asthma, HTN, presented to the ED accompanied by his wife s/p fall in the driveway while using a leaf blower to clean the driveway. Pt not sure how he lost his balance but landed on his left side w/o LOC. Pt's wife was present and states herself she is not sure how he fell as well. But no LOC or syncopal episode. Pt was unable to move nor was wife able to help him up due to pain in her left leg. Pt denies prior to falling any episode of cp, palpitations, sob, abd pain, N/V, fever, chills.  (10 Apr 2025 13:20)    Orthopedics, Dr Casanova consulted for further evaluation of left femoral neck fx and surgical intervention.      PAST MEDICAL & SURGICAL HISTORY:  Asthma      COPD (chronic obstructive pulmonary disease)      Environmental lung disease      HTN (hypertension)      GERD (gastroesophageal reflux disease)      S/P hernia repair      S/P colonoscopy      MEDICATIONS  (STANDING):  buDESOnide    Inhalation Suspension 0.5 milliGRAM(s) Inhalation two times a day  enoxaparin Injectable 40 milliGRAM(s) SubCutaneous every 24 hours  pantoprazole    Tablet 40 milliGRAM(s) Oral before breakfast  sodium chloride 0.9%. 1000 milliLiter(s) (125 mL/Hr) IV Continuous <Continuous>  valsartan 40 milliGRAM(s) Oral daily    MEDICATIONS  (PRN):  acetaminophen     Tablet .. 650 milliGRAM(s) Oral every 6 hours PRN Temp greater or equal to 38C (100.4F), Mild Pain (1 - 3)  albuterol    90 MICROgram(s) HFA Inhaler 2 Puff(s) Inhalation every 6 hours PRN Shortness of Breath  morphine  - Injectable 1 milliGRAM(s) IV Push every 2 hours PRN Severe Pain (7 - 10)  ondansetron Injectable 4 milliGRAM(s) IV Push every 8 hours PRN Nausea and/or Vomiting      Allergies    No Known Allergies    Intolerances    Social History:  lives w/ wife  Former smoker 40 years ago  No EtOH use (10 Apr 2025 13:20)    PE:  Vital Signs Last 24 Hrs  T(C): 36.6 (10 Apr 2025 11:43), Max: 36.6 (10 Apr 2025 11:43)  T(F): 97.9 (10 Apr 2025 11:43), Max: 97.9 (10 Apr 2025 11:43)  HR: 67 (10 Apr 2025 11:43) (67 - 67)  BP: 131/71 (10 Apr 2025 11:43) (131/71 - 131/71)  BP(mean): --  RR: 18 (10 Apr 2025 11:43) (18 - 18)  SpO2: 98% (10 Apr 2025 11:43) (98% - 98%)    Parameters below as of 10 Apr 2025 11:43  Patient On (Oxygen Delivery Method): room air    CONSTITUTIONAL: well-appearing, non-toxic, NAD  	SKIN: Warm dry, normal skin turgor  	HEAD: NCAT  	EYES: EOMI, PERRLA, no scleral icterus  	ENT: Moist mucous membranes, normal pharynx   	CARD: RRR, no murmurs, rubs or gallops  	RESP: clear to ausculation b/l.  No rales, rhonchi, or wheezing.  	ABD: soft, + BS, non-tender, non-distended, no rebound or guarding. No CVA tenderness  	EXT: Left hip tenderness, LLE externally rotated, shortened  + DP pulses +foot drop +1 strength/ minimal ROM  on dorsiflexion with gastroc contracture unable to assess on ambulation pt on bedrest -EHL -AT no tib ant function.     NEURO: normal motor. normal sensory. no facial droop, no slurred speech                               14.0   6.95  )-----------( 246      ( 10 Apr 2025 12:11 )             41.3       04-10    138  |  104  |  16  ----------------------------<  205[H]  3.9   |  28  |  1.12    Ca    8.4      10 Apr 2025 12:11    TPro  6.3  /  Alb  3.4  /  TBili  0.5  /  DBili  x   /  AST  21  /  ALT  17  /  AlkPhos  88  04-10      PT/INR - ( 10 Apr 2025 12:11 )   PT: 11.8 sec;   INR: 1.00 ratio         PTT - ( 10 Apr 2025 12:11 )  PTT:31.1 sec    ABO RH Interpretation: O POS (04.10.25 @ 12:11) Antibody Screen: NEG (04.10.25 @ 12:11) < from: Xray Femur 2 Views, Left (04.10.25 @ 12:28) >  ACC: [id number] EXAM:  XR FEMUR 2 VIEWS LT   ORDERED BY: TARA VARELA     ACC: [id number] EXAM:  XR CHEST PORTABLE URGENT 1V   ORDERED BY: TARA VARELA     ACC: [id number] EXAM:  XR HIP WITH PELV 2-3V LT   ORDERED BY: TARA VARELA     PROCEDURE DATE:  04/10/2025          INTERPRETATION:  Left hip with pelvis, left femur, and chest. Patient had   a fall.    Left hip with pelvis. 3 views. 4 images.    There is lower lumbar degenerative loss of disc height.    There are mild bilateralhip degenerative findings.    There is a neck fracture of the left hip with displacement.    Left femur. 4 views. 5 images. There is a knee replacement with grossly   normal alignment. No further fracture.    AP chest on April 10, 2025 at 12:07 PM.    Heart normal for projection.    Lungs are clear. No fracture or change from October 6, 2024.    IMPRESSION:  There is a neck fracture of the left femur with displacement. No acute   chest finding.    --- End of Report ---            MAHAD HANSON MD; Attending Radiologist  This document has been electronically signed. Apr 10 2025 12:31PM    < end of copied text >  < from: Xray Hip w/ Pelvis 2 or 3 Views, Left (04.10.25 @ 12:28) >    ACC: [id number] EXAM:  XR FEMUR 2 VIEWS LT   ORDERED BY: TARA VARELA     ACC: [id number] EXAM:  XR CHEST PORTABLE URGENT 1V   ORDERED BY: TARA VARELA     ACC: [id number] EXAM:  XR HIP WITH PELV 2-3V LT   ORDERED BY: TARA VARELA     PROCEDURE DATE:  04/10/2025          INTERPRETATION:  Left hip with pelvis, left femur, and chest. Patient had   a fall.    Left hip with pelvis. 3 views. 4 images.    There is lower lumbar degenerative loss of disc height.    There are mild bilateralhip degenerative findings.    There is a neck fracture of the left hip with displacement.    Left femur. 4 views. 5 images. There is a knee replacement with grossly   normal alignment. No further fracture.    AP chest on April 10, 2025 at 12:07 PM.    Heart normal for projection.    Lungs are clear. No fracture or change from October 6, 2024.    IMPRESSION:  There is a neck fracture of the left femur with displacement. No acute   chest finding.    --- End of Report ---            MAHAD HANSON MD; Attending Radiologist  This document has been electronically signed. Apr 10 2025 12:31PM    < end of copied text >     HPI:  78yo male w/ hx of Asthma, HTN, presented to the ED accompanied by his wife s/p fall in the driveway while using a leaf blower to clean the driveway. Pt not sure how he lost his balance but landed on his left side w/o LOC. Pt's wife was present and states herself she is not sure how he fell as well. But no LOC or syncopal episode. Pt was unable to move nor was wife able to help him up due to pain in her left leg. Pt denies prior to falling any episode of cp, palpitations, sob, abd pain, N/V, fever, chills.  (10 Apr 2025 13:20)    Orthopedics, Dr Casanova consulted for further evaluation of left femoral neck fx and surgical intervention.      PAST MEDICAL & SURGICAL HISTORY:  Asthma      COPD (chronic obstructive pulmonary disease)      Environmental lung disease      HTN (hypertension)      GERD (gastroesophageal reflux disease)      S/P hernia repair      S/P colonoscopy      MEDICATIONS  (STANDING):  buDESOnide    Inhalation Suspension 0.5 milliGRAM(s) Inhalation two times a day  enoxaparin Injectable 40 milliGRAM(s) SubCutaneous every 24 hours  pantoprazole    Tablet 40 milliGRAM(s) Oral before breakfast  sodium chloride 0.9%. 1000 milliLiter(s) (125 mL/Hr) IV Continuous <Continuous>  valsartan 40 milliGRAM(s) Oral daily    MEDICATIONS  (PRN):  acetaminophen     Tablet .. 650 milliGRAM(s) Oral every 6 hours PRN Temp greater or equal to 38C (100.4F), Mild Pain (1 - 3)  albuterol    90 MICROgram(s) HFA Inhaler 2 Puff(s) Inhalation every 6 hours PRN Shortness of Breath  morphine  - Injectable 1 milliGRAM(s) IV Push every 2 hours PRN Severe Pain (7 - 10)  ondansetron Injectable 4 milliGRAM(s) IV Push every 8 hours PRN Nausea and/or Vomiting      Allergies    No Known Allergies    Intolerances    Social History:  lives w/ wife  Former smoker 40 years ago  No EtOH use (10 Apr 2025 13:20)    PE:  Vital Signs Last 24 Hrs  T(C): 36.6 (10 Apr 2025 11:43), Max: 36.6 (10 Apr 2025 11:43)  T(F): 97.9 (10 Apr 2025 11:43), Max: 97.9 (10 Apr 2025 11:43)  HR: 67 (10 Apr 2025 11:43) (67 - 67)  BP: 131/71 (10 Apr 2025 11:43) (131/71 - 131/71)  BP(mean): --  RR: 18 (10 Apr 2025 11:43) (18 - 18)  SpO2: 98% (10 Apr 2025 11:43) (98% - 98%)    Parameters below as of 10 Apr 2025 11:43  Patient On (Oxygen Delivery Method): room air    CONSTITUTIONAL: well-appearing, non-toxic, NAD  	SKIN: Warm dry, normal skin turgor  	HEAD: NCAT  	EYES: EOMI, PERRLA, no scleral icterus  	ENT: Moist mucous membranes, normal pharynx   	CARD: RRR, no murmurs, rubs or gallops  	RESP: clear to ausculation b/l.  No rales, rhonchi, or wheezing.  	ABD: soft, + BS, non-tender, non-distended, no rebound or guarding. No CVA tenderness  	EXT: Left hip tenderness, LLE externally rotated, shortened  + DP pulses +foot drop +1 strength/ minimal ROM  on dorsiflexion with gastroc contracture unable to assess on ambulation pt on bedrest -EHL -AT no tib ant function.     NEURO: normal motor. normal sensory. no facial droop, no slurred speech                               14.0   6.95  )-----------( 246      ( 10 Apr 2025 12:11 )             41.3       04-10    138  |  104  |  16  ----------------------------<  205[H]  3.9   |  28  |  1.12    Ca    8.4      10 Apr 2025 12:11    TPro  6.3  /  Alb  3.4  /  TBili  0.5  /  DBili  x   /  AST  21  /  ALT  17  /  AlkPhos  88  04-10      PT/INR - ( 10 Apr 2025 12:11 )   PT: 11.8 sec;   INR: 1.00 ratio         PTT - ( 10 Apr 2025 12:11 )  PTT:31.1 sec    ABO RH Interpretation: O POS (04.10.25 @ 12:11) Antibody Screen: NEG (04.10.25 @ 12:11) < from: Xray Femur 2 Views, Left (04.10.25 @ 12:28) >  ACC: [id number] EXAM:  XR FEMUR 2 VIEWS LT   ORDERED BY: TARA VARELA     ACC: [id number] EXAM:  XR CHEST PORTABLE URGENT 1V   ORDERED BY: TARA VARELA     ACC: [id number] EXAM:  XR HIP WITH PELV 2-3V LT   ORDERED BY: TARA VARELA     PROCEDURE DATE:  04/10/2025          INTERPRETATION:  Left hip with pelvis, left femur, and chest. Patient had   a fall.    Left hip with pelvis. 3 views. 4 images.    There is lower lumbar degenerative loss of disc height.    There are mild bilateralhip degenerative findings.    There is a neck fracture of the left hip with displacement.    Left femur. 4 views. 5 images. There is a knee replacement with grossly   normal alignment. No further fracture.    AP chest on April 10, 2025 at 12:07 PM.    Heart normal for projection.    Lungs are clear. No fracture or change from October 6, 2024.    IMPRESSION:  There is a neck fracture of the left femur with displacement. No acute   chest finding.    --- End of Report ---            MAHAD HANSON MD; Attending Radiologist  This document has been electronically signed. Apr 10 2025 12:31PM    < end of copied text >  < from: Xray Hip w/ Pelvis 2 or 3 Views, Left (04.10.25 @ 12:28) >    ACC: [id number] EXAM:  XR FEMUR 2 VIEWS LT   ORDERED BY: TARA VARELA     ACC: [id number] EXAM:  XR CHEST PORTABLE URGENT 1V   ORDERED BY: TARA VARELA     ACC: [id number] EXAM:  XR HIP WITH PELV 2-3V LT   ORDERED BY: TARA VARELA     PROCEDURE DATE:  04/10/2025          INTERPRETATION:  Left hip with pelvis, left femur, and chest. Patient had   a fall.    Left hip with pelvis. 3 views. 4 images.    There is lower lumbar degenerative loss of disc height.    There are mild bilateralhip degenerative findings.    There is a neck fracture of the left hip with displacement.    Left femur. 4 views. 5 images. There is a knee replacement with grossly   normal alignment. No further fracture.    AP chest on April 10, 2025 at 12:07 PM.    Heart normal for projection.    Lungs are clear. No fracture or change from October 6, 2024.    IMPRESSION:  There is a neck fracture of the left femur with displacement. No acute   chest finding.    --- End of Report ---            MAHAD HANSON MD; Attending Radiologist  This document has been electronically signed. Apr 10 2025 12:31PM    < end of copied text >

## 2025-04-10 NOTE — ED ADULT NURSE NOTE - OBJECTIVE STATEMENT
79 year old Male comes to the ER with left hip pain after a trip and fall outside landing on left hip. Denies hitting head and loss of consciousness. Patient only has pain to left hip with movement, took no medications prior to arrival. Bed locked and in lowest position for safety.

## 2025-04-10 NOTE — H&P ADULT - ASSESSMENT
80yo male w/ hx of Asthma, HTN, presented to the ED accompanied by his wife s/p fall in the driveway while using a leaf blower to clean the driveway, noted on imaging to have Left femoral Neck fracture    #Fall  #Left Femoral neck fracture  -Xray imaging results noted  -Ortho consulted in the ED, awaiting recommendations  -Pain control  -NPO for now except medications  -NWB for now    #Preoperative clearance  -RCRI : 0   CLASS 1 risk of perioperative  cardiac events with a risk percentage of 0.4%    METS ~ 4 ( expected functional status given age)  According to ACS Surgical risk calculator 6.7% risk for any complication   Denies CP, SOB on exertion  No absolute contraindications    Based on current ACC/AHA guidelines, pt history and physical exam , the pt is considered to have low risk from a cardiovascular standpoint for planned procedure.    No further testing needed prior to procedure.    #HTN  -Valsartan    #Asthma  #Asbestosis exposure  -Stable on RA  -CXR results noted; Clear lungs  -Continue Inhalers    #VTE ppx  -Lovenox    Updated wife at bedside  Updated PMD

## 2025-04-10 NOTE — H&P ADULT - HISTORY OF PRESENT ILLNESS
78yo male w/ hx of Asthma, HTN, presented to the ED accompanied by his wife s/p fall in the driveway while using a leaf blower to clean the driveway. Pt not sure how he lost his balance but landed on his left side w/o LOC. Pt's wife was present and states herself she is not sure how he fell as well. But no LOC or syncopal episode. Pt was unable to move nor was wife able to help him up due to pain in her left leg. Pt denies prior to falling any episode of cp, palpitations, sob, abd pain, N/V, fever, chills.

## 2025-04-10 NOTE — ED ADULT NURSE NOTE - NSFALLRISKINTERV_ED_ALL_ED
Assistance OOB with selected safe patient handling equipment if applicable/Assistance with ambulation/Communicate fall risk and risk factors to all staff, patient, and family/Monitor gait and stability/Provide visual cue: yellow wristband, yellow gown, etc/Reinforce activity limits and safety measures with patient and family/Call bell, personal items and telephone in reach/Instruct patient to call for assistance before getting out of bed/chair/stretcher/Non-slip footwear applied when patient is off stretcher/[place] to call system/Physically safe environment - no spills, clutter or unnecessary equipment/Purposeful Proactive Rounding/Room/bathroom lighting operational, light cord in reach

## 2025-04-10 NOTE — CONSULT NOTE ADULT - NS ATTEND AMEND GEN_ALL_CORE FT
Formal consult dictated  Left femoral neck fracture in minimal household ambulator with assistance device  Known chronic left foot drop per family either from knee replacement vs low back pathology  Risks benefits and alternatives discussed at length  Fam and patient agreed to the purposed intervention  Plan for left hip hemiarthroplasty  Greater than 52min spent discussing and coordination of the patients care.    DLS

## 2025-04-10 NOTE — CONSULT NOTE ADULT - ASSESSMENT
left femoral neck fx  -medically cleared for surgery  Dr Casanova to see patient later today to discuss further and obtain consent.  -NPO  -Bedrest  -pain regimen  -IVF       left femoral neck fx  -medically cleared for surgery  Dr aCsanova to see patient later today to discuss further and obtain consent.  -NPO  -Bedrest  -pain regimen  -IVF

## 2025-04-10 NOTE — ED PROVIDER NOTE - PHYSICAL EXAMINATION
VITAL SIGNS: I have reviewed nursing notes and confirm.  CONSTITUTIONAL: well-appearing, non-toxic, NAD  SKIN: Warm dry, normal skin turgor  HEAD: NCAT  EYES: EOMI, PERRLA, no scleral icterus  ENT: Moist mucous membranes, normal pharynx   CARD: RRR, no murmurs, rubs or gallops  RESP: clear to ausculation b/l.  No rales, rhonchi, or wheezing.  ABD: soft, + BS, non-tender, non-distended, no rebound or guarding. No CVA tenderness  EXT: Left hip tenderness, LLE externally rotated, shortened   NEURO: normal motor. normal sensory. no facial droop, no slurred speech   PSYCH: Cooperative, appropriate.

## 2025-04-10 NOTE — ED PROVIDER NOTE - CLINICAL SUMMARY MEDICAL DECISION MAKING FREE TEXT BOX
79-year-old male with past medical history hypertension GERD asbestos lung disease and asthma presenting to the ED status post mechanical slip and fall onto left hip denies any head injury LOC, no reported chest pain shortness of breath or abdominal pain.  Patient reports isolated left hip pain, fall occurred today not on anticoagulation, patient with left lower extremity externally rotated and shortened, left hip tenderness elicited, x-ray of left hip with displaced femoral neck fracture, orthopedic consult Dr. Casanova, will admit to medicine for clearance for surgery.

## 2025-04-10 NOTE — H&P ADULT - MUSCULOSKELETAL COMMENTS
LLE Shortened and externally rotated 2/5 Patient showing some improving trend will give more time, if needs more medication titration due to ongoing symptoms will increase Seroquel not haldol  2/6 Improving cont meds, offered prn suppository and simethicone  2/7 Slowly improving will cont current meds, eval need for further increase Seroquel  2/8 Partial response continue meds except will increase Seroquel as was on 250mg/d PTA  2/9 Overall gains, still regresses and becomes difficult to manage will increase Seroquel to 200mg/d in divided dosing  2/12 A little calmer since started on additional mid day, cont other meds w/o change  2/13 Patient better still disorganized, hyper Yazidism but less agitated, cont current treatment  2/14 Improved globally with some symptom variability. Cont current med consider further increment Seroquel  2/15 Improved cont current regimen for now  2/16 Showing  some gains continue treatment for now at current doses

## 2025-04-11 ENCOUNTER — TRANSCRIPTION ENCOUNTER (OUTPATIENT)
Age: 80
End: 2025-04-11

## 2025-04-11 LAB
24R-OH-CALCIDIOL SERPL-MCNC: 75.9 NG/ML — SIGNIFICANT CHANGE UP
ANION GAP SERPL CALC-SCNC: 12 MMOL/L — SIGNIFICANT CHANGE UP (ref 5–17)
BUN SERPL-MCNC: 13 MG/DL — SIGNIFICANT CHANGE UP (ref 7–23)
CALCIUM SERPL-MCNC: 8 MG/DL — LOW (ref 8.4–10.5)
CHLORIDE SERPL-SCNC: 101 MMOL/L — SIGNIFICANT CHANGE UP (ref 96–108)
CO2 SERPL-SCNC: 21 MMOL/L — LOW (ref 22–31)
CREAT SERPL-MCNC: 0.89 MG/DL — SIGNIFICANT CHANGE UP (ref 0.5–1.3)
EGFR: 86 ML/MIN/1.73M2 — SIGNIFICANT CHANGE UP
EGFR: 86 ML/MIN/1.73M2 — SIGNIFICANT CHANGE UP
GLUCOSE SERPL-MCNC: 106 MG/DL — HIGH (ref 70–99)
HCT VFR BLD CALC: 40.6 % — SIGNIFICANT CHANGE UP (ref 39–50)
HGB BLD-MCNC: 13.5 G/DL — SIGNIFICANT CHANGE UP (ref 13–17)
MCHC RBC-ENTMCNC: 29.2 PG — SIGNIFICANT CHANGE UP (ref 27–34)
MCHC RBC-ENTMCNC: 33.3 G/DL — SIGNIFICANT CHANGE UP (ref 32–36)
MCV RBC AUTO: 87.7 FL — SIGNIFICANT CHANGE UP (ref 80–100)
NRBC BLD AUTO-RTO: 0 /100 WBCS — SIGNIFICANT CHANGE UP (ref 0–0)
PLATELET # BLD AUTO: 207 K/UL — SIGNIFICANT CHANGE UP (ref 150–400)
POTASSIUM SERPL-MCNC: 3.6 MMOL/L — SIGNIFICANT CHANGE UP (ref 3.5–5.3)
POTASSIUM SERPL-SCNC: 3.6 MMOL/L — SIGNIFICANT CHANGE UP (ref 3.5–5.3)
RBC # BLD: 4.63 M/UL — SIGNIFICANT CHANGE UP (ref 4.2–5.8)
RBC # FLD: 13.3 % — SIGNIFICANT CHANGE UP (ref 10.3–14.5)
SODIUM SERPL-SCNC: 134 MMOL/L — LOW (ref 135–145)
WBC # BLD: 9.85 K/UL — SIGNIFICANT CHANGE UP (ref 3.8–10.5)
WBC # FLD AUTO: 9.85 K/UL — SIGNIFICANT CHANGE UP (ref 3.8–10.5)

## 2025-04-11 PROCEDURE — 99233 SBSQ HOSP IP/OBS HIGH 50: CPT | Mod: FS

## 2025-04-11 PROCEDURE — 73501 X-RAY EXAM HIP UNI 1 VIEW: CPT | Mod: 26,LT

## 2025-04-11 DEVICE — HEAD FEM 12/14 28MM PLUS5MM: Type: IMPLANTABLE DEVICE | Site: LEFT | Status: FUNCTIONAL

## 2025-04-11 DEVICE — STEM FEM ACTIS TAPR STD COLLAR 12/14 SZ 7: Type: IMPLANTABLE DEVICE | Site: LEFT | Status: FUNCTIONAL

## 2025-04-11 DEVICE — IMPLANTABLE DEVICE: Type: IMPLANTABLE DEVICE | Site: LEFT | Status: FUNCTIONAL

## 2025-04-11 RX ORDER — SENNA 187 MG
2 TABLET ORAL AT BEDTIME
Refills: 0 | Status: DISCONTINUED | OUTPATIENT
Start: 2025-04-11 | End: 2025-04-14

## 2025-04-11 RX ORDER — CEFAZOLIN SODIUM IN 0.9 % NACL 3 G/100 ML
2000 INTRAVENOUS SOLUTION, PIGGYBACK (ML) INTRAVENOUS EVERY 8 HOURS
Refills: 0 | Status: COMPLETED | OUTPATIENT
Start: 2025-04-11 | End: 2025-04-12

## 2025-04-11 RX ORDER — SODIUM CHLORIDE 9 G/1000ML
1000 INJECTION, SOLUTION INTRAVENOUS
Refills: 0 | Status: DISCONTINUED | OUTPATIENT
Start: 2025-04-11 | End: 2025-04-11

## 2025-04-11 RX ORDER — HYDROMORPHONE/SOD CHLOR,ISO/PF 2 MG/10 ML
1 SYRINGE (ML) INJECTION EVERY 4 HOURS
Refills: 0 | Status: DISCONTINUED | OUTPATIENT
Start: 2025-04-11 | End: 2025-04-13

## 2025-04-11 RX ORDER — HYDROMORPHONE/SOD CHLOR,ISO/PF 2 MG/10 ML
0.5 SYRINGE (ML) INJECTION
Refills: 0 | Status: DISCONTINUED | OUTPATIENT
Start: 2025-04-11 | End: 2025-04-11

## 2025-04-11 RX ORDER — OXYCODONE HYDROCHLORIDE 30 MG/1
5 TABLET ORAL EVERY 4 HOURS
Refills: 0 | Status: DISCONTINUED | OUTPATIENT
Start: 2025-04-11 | End: 2025-04-13

## 2025-04-11 RX ORDER — ONDANSETRON HCL/PF 4 MG/2 ML
4 VIAL (ML) INJECTION ONCE
Refills: 0 | Status: DISCONTINUED | OUTPATIENT
Start: 2025-04-11 | End: 2025-04-11

## 2025-04-11 RX ADMIN — Medication 100 MILLIGRAM(S): at 23:29

## 2025-04-11 RX ADMIN — Medication 40 MILLIGRAM(S): at 05:35

## 2025-04-11 RX ADMIN — ENOXAPARIN SODIUM 40 MILLIGRAM(S): 100 INJECTION SUBCUTANEOUS at 23:30

## 2025-04-11 RX ADMIN — BUDESONIDE 0.5 MILLIGRAM(S): 90 AEROSOL, POWDER RESPIRATORY (INHALATION) at 08:41

## 2025-04-11 RX ADMIN — Medication 125 MILLILITER(S): at 10:03

## 2025-04-11 RX ADMIN — Medication 1 MILLIGRAM(S): at 10:01

## 2025-04-11 RX ADMIN — Medication 2 TABLET(S): at 23:30

## 2025-04-11 NOTE — PROGRESS NOTE ADULT - ASSESSMENT
78yo male w/ hx of Asthma, HTN, presented to the ED accompanied by his wife s/p fall in the driveway while using a leaf blower to clean the driveway, noted on imaging to have Left femoral Neck fracture    #Fall  #Left Femoral neck fracture  -Xray imaging results noted  -Ortho consulted in the ED, awaiting recommendations  -Pain control  -NPO for now except medications  -NWB for now    #Preoperative clearance  -RCRI : 0   CLASS 1 risk of perioperative  cardiac events with a risk percentage of 0.4%    METS ~ 4 ( expected functional status given age)  According to ACS Surgical risk calculator 6.7% risk for any complication   Denies CP, SOB on exertion  No absolute contraindications    Based on current ACC/AHA guidelines, pt history and physical exam , the pt is considered to have low risk from a cardiovascular standpoint for planned procedure.    No further testing needed prior to procedure.    #HTN  -Valsartan    #Asthma  #Asbestosis exposure  -Stable on RA  -CXR results noted; Clear lungs  -Continue Inhalers    #VTE ppx  -Lovenox   80yo male w/ hx of Asthma, HTN, presented to the ED accompanied by his wife s/p fall in the driveway while using a leaf blower to clean the driveway, noted on imaging to have Left femoral Neck fracture    #Fall  #Left Femoral neck fracture  -Xray imaging results noted  -Ortho consulted, Sx today  -Pain control  -NPO for now except medications  -NWB for now    #Preoperative clearance  -RCRI : 0   CLASS 1 risk of perioperative  cardiac events with a risk percentage of 0.4%    METS ~ 4 ( expected functional status given age)  According to ACS Surgical risk calculator 6.7% risk for any complication   Denies CP, SOB on exertion  No absolute contraindications  Based on current ACC/AHA guidelines, pt history and physical exam , the pt is considered to have low risk from a cardiovascular standpoint for planned procedure.  No further testing needed prior to procedure.    #HTN  -Valsartan    #Asthma  #Asbestosis exposure  -Stable on RA  -CXR results noted; Clear lungs  -Continue Inhalers    #VTE ppx  -Lovenox

## 2025-04-11 NOTE — PRE-OP CHECKLIST - ADDITIONAL CONSENTS
[FreeTextEntry1] : PMR- continue with rheum\par check labs \par ekg done by cards
Representative consent obtained

## 2025-04-11 NOTE — PROGRESS NOTE ADULT - SUBJECTIVE AND OBJECTIVE BOX
80yo male w/ hx of Asthma, HTN, presented to the ED accompanied by his wife s/p fall in the driveway while using a leaf blower to clean the driveway, noted on imaging to have Left femoral Neck fracture    Overnight Events: None  Interval HPI: Patient seen and examined at bedside.     REVIEW OF SYSTEMS:  CONSTITUTIONAL: (-) weakness, (-) fevers, (-) chills  EYES/ENT: (-) visual changes,  (-) vertigo,  (-) throat pain   NECK:  (-) pain, (-) stiffness  RESPIRATORY:  (-) shortness of breath, (-) cough,  (-) wheezing,  (-) hemoptysis   CARDIOVASCULAR:  (-) chest pain, (-) palpitations  GASTROINTESTINAL:  (-) abdominal or epigastric pain, (-) nausea, (-) vomiting, (-) diarrhea, (-) constipation, (-) melena,  (-) hematemesis,  (-) hematochezia  GENITOURINARY: (-) dysuria, (-) frequency, (-) hematuria  NEUROLOGICAL: (-) numbness, (-) weakness  SKIN: (-) itching, (-) rashes, (-) lesions    Vital Signs Last 24 Hrs  T(C): 37 (11 Apr 2025 05:31), Max: 37.1 (10 Apr 2025 21:00)  T(F): 98.6 (11 Apr 2025 05:31), Max: 98.7 (10 Apr 2025 21:00)  HR: 78 (11 Apr 2025 05:31) (67 - 79)  BP: 161/72 (11 Apr 2025 05:31) (131/71 - 172/77)  BP(mean): --  RR: 18 (11 Apr 2025 05:31) (17 - 19)  SpO2: 94% (11 Apr 2025 05:31) (94% - 98%)    Parameters below as of 11 Apr 2025 05:31  Patient On (Oxygen Delivery Method): room air    PHYSICAL EXAM:  GENERAL: NAD, lying in bed comfortably  HEAD:  Atraumatic, Normocephalic  EYES: EOMI, conjunctiva and sclera clear  ENT: Moist mucous membranes  NECK: Supple, No JVD  CHEST/LUNG: Clear to auscultation bilaterally, good air entry bilaterally; No wheezing, rales, or rhonchi. Unlabored respirations  HEART: Regular rate and rhythm. S1 and S2. No murmurs, rubs, or gallops  ABDOMEN: Soft, Nontender, Nondistended. Bowel sounds present.   EXTREMITIES:  2+ Peripheral Pulses. No clubbing, cyanosis, or edema  NERVOUS SYSTEM:  Alert & Oriented X3, speech clear. No deficits.  MSK: FROM all 4 extremities, full and equal strength  SKIN: No rashes, bruises, or other lesions    LABS:   All Labs Personally Reviewed                         13.5   9.85  )-----------( 207      ( 11 Apr 2025 05:30 )             40.6     04-10    138  |  104  |  16  ----------------------------<  205[H]  3.9   |  28  |  1.12    Ca    8.4      10 Apr 2025 12:11    TPro  x   /  Alb  3.4  /  TBili  x   /  DBili  x   /  AST  x   /  ALT  x   /  AlkPhos  x   04-10    PT/INR - ( 10 Apr 2025 12:11 )   PT: 11.8 sec;   INR: 1.00 ratio         PTT - ( 10 Apr 2025 12:11 )  PTT:31.1 sec      Blood Culture:   I&O's Summary    CAPILLARY BLOOD GLUCOSE          RADIOLOGY/EKG:  All Imaging and EKGs Personally Reviewed     MEDICATIONS:  MEDICATIONS  (STANDING):  buDESOnide    Inhalation Suspension 0.5 milliGRAM(s) Inhalation two times a day  enoxaparin Injectable 40 milliGRAM(s) SubCutaneous every 24 hours  pantoprazole    Tablet 40 milliGRAM(s) Oral before breakfast  sodium chloride 0.9%. 1000 milliLiter(s) (125 mL/Hr) IV Continuous <Continuous>  valsartan 40 milliGRAM(s) Oral daily     78yo male w/ hx of Asthma, HTN, presented to the ED accompanied by his wife s/p fall in the driveway while using a leaf blower to clean the driveway, noted on imaging to have Left femoral Neck fracture    Overnight Events: None  Interval HPI: Patient seen and examined at bedside. Patient c/o pain but its well controlled. Reports mechanical fall, no syncope/LOC. No recurrent falls    REVIEW OF SYSTEMS:  CONSTITUTIONAL: (-) weakness, (-) fevers, (-) chills  EYES/ENT: (-) visual changes,  (-) vertigo,  (-) throat pain   NECK:  (-) pain, (-) stiffness  RESPIRATORY:  (-) shortness of breath, (-) cough,  (-) wheezing,  (-) hemoptysis   CARDIOVASCULAR:  (-) chest pain, (-) palpitations  GASTROINTESTINAL:  (-) abdominal or epigastric pain, (-) nausea, (-) vomiting, (-) diarrhea, (-) constipation, (-) melena,  (-) hematemesis,  (-) hematochezia  GENITOURINARY: (-) dysuria, (-) frequency, (-) hematuria  NEUROLOGICAL: (-) numbness, (-) weakness  + Pain left hip area    Vital Signs Last 24 Hrs  T(C): 37 (11 Apr 2025 05:31), Max: 37.1 (10 Apr 2025 21:00)  T(F): 98.6 (11 Apr 2025 05:31), Max: 98.7 (10 Apr 2025 21:00)  HR: 78 (11 Apr 2025 05:31) (67 - 79)  BP: 161/72 (11 Apr 2025 05:31) (131/71 - 172/77)  BP(mean): --  RR: 18 (11 Apr 2025 05:31) (17 - 19)  SpO2: 94% (11 Apr 2025 05:31) (94% - 98%)    Parameters below as of 11 Apr 2025 05:31  Patient On (Oxygen Delivery Method): room air    PHYSICAL EXAM:  GENERAL: NAD, lying in bed comfortably  CHEST/LUNG: Clear to auscultation bilaterally, good air entry bilaterally; No wheezing, rales, or rhonchi. Unlabored respirations  HEART: Regular rate and rhythm. S1 and S2. No murmurs, rubs, or gallops  ABDOMEN: Soft, Nontender, Nondistended.  EXTREMITIES: Moves toes and feet b/l, good pulses, normal sensation to touch  NERVOUS SYSTEM:  Alert & Oriented X3, speech clear      LABS:   All Labs Personally Reviewed                         13.5   9.85  )-----------( 207      ( 11 Apr 2025 05:30 )             40.6     04-10    138  |  104  |  16  ----------------------------<  205[H]  3.9   |  28  |  1.12    Ca    8.4      10 Apr 2025 12:11    TPro  x   /  Alb  3.4  /  TBili  x   /  DBili  x   /  AST  x   /  ALT  x   /  AlkPhos  x   04-10    PT/INR - ( 10 Apr 2025 12:11 )   PT: 11.8 sec;   INR: 1.00 ratio         PTT - ( 10 Apr 2025 12:11 )  PTT:31.1 sec      Blood Culture:   I&O's Summary    CAPILLARY BLOOD GLUCOSE          RADIOLOGY/EKG:  All Imaging and EKGs Personally Reviewed     MEDICATIONS:  MEDICATIONS  (STANDING):  buDESOnide    Inhalation Suspension 0.5 milliGRAM(s) Inhalation two times a day  enoxaparin Injectable 40 milliGRAM(s) SubCutaneous every 24 hours  pantoprazole    Tablet 40 milliGRAM(s) Oral before breakfast  sodium chloride 0.9%. 1000 milliLiter(s) (125 mL/Hr) IV Continuous <Continuous>  valsartan 40 milliGRAM(s) Oral daily

## 2025-04-12 LAB
ALBUMIN SERPL ELPH-MCNC: 2.6 G/DL — LOW (ref 3.3–5)
ALP SERPL-CCNC: 62 U/L — SIGNIFICANT CHANGE UP (ref 40–120)
ALT FLD-CCNC: 13 U/L — SIGNIFICANT CHANGE UP (ref 10–45)
ANION GAP SERPL CALC-SCNC: 6 MMOL/L — SIGNIFICANT CHANGE UP (ref 5–17)
AST SERPL-CCNC: 22 U/L — SIGNIFICANT CHANGE UP (ref 10–40)
BILIRUB SERPL-MCNC: 0.7 MG/DL — SIGNIFICANT CHANGE UP (ref 0.2–1.2)
BUN SERPL-MCNC: 18 MG/DL — SIGNIFICANT CHANGE UP (ref 7–23)
CALCIUM SERPL-MCNC: 7.8 MG/DL — LOW (ref 8.4–10.5)
CHLORIDE SERPL-SCNC: 100 MMOL/L — SIGNIFICANT CHANGE UP (ref 96–108)
CO2 SERPL-SCNC: 26 MMOL/L — SIGNIFICANT CHANGE UP (ref 22–31)
CREAT SERPL-MCNC: 1.17 MG/DL — SIGNIFICANT CHANGE UP (ref 0.5–1.3)
EGFR: 63 ML/MIN/1.73M2 — SIGNIFICANT CHANGE UP
EGFR: 63 ML/MIN/1.73M2 — SIGNIFICANT CHANGE UP
GLUCOSE SERPL-MCNC: 149 MG/DL — HIGH (ref 70–99)
HCT VFR BLD CALC: 35.5 % — LOW (ref 39–50)
HGB BLD-MCNC: 11.7 G/DL — LOW (ref 13–17)
MCHC RBC-ENTMCNC: 29.2 PG — SIGNIFICANT CHANGE UP (ref 27–34)
MCHC RBC-ENTMCNC: 33 G/DL — SIGNIFICANT CHANGE UP (ref 32–36)
MCV RBC AUTO: 88.5 FL — SIGNIFICANT CHANGE UP (ref 80–100)
NRBC BLD AUTO-RTO: 0 /100 WBCS — SIGNIFICANT CHANGE UP (ref 0–0)
PLATELET # BLD AUTO: 206 K/UL — SIGNIFICANT CHANGE UP (ref 150–400)
POTASSIUM SERPL-MCNC: 4.2 MMOL/L — SIGNIFICANT CHANGE UP (ref 3.5–5.3)
POTASSIUM SERPL-SCNC: 4.2 MMOL/L — SIGNIFICANT CHANGE UP (ref 3.5–5.3)
PROT SERPL-MCNC: 5.5 G/DL — LOW (ref 6–8.3)
RBC # BLD: 4.01 M/UL — LOW (ref 4.2–5.8)
RBC # FLD: 13.4 % — SIGNIFICANT CHANGE UP (ref 10.3–14.5)
SODIUM SERPL-SCNC: 132 MMOL/L — LOW (ref 135–145)
WBC # BLD: 13.99 K/UL — HIGH (ref 3.8–10.5)
WBC # FLD AUTO: 13.99 K/UL — HIGH (ref 3.8–10.5)

## 2025-04-12 PROCEDURE — 99233 SBSQ HOSP IP/OBS HIGH 50: CPT

## 2025-04-12 RX ORDER — POLYETHYLENE GLYCOL 3350 17 G/17G
17 POWDER, FOR SOLUTION ORAL
Refills: 0 | Status: DISCONTINUED | OUTPATIENT
Start: 2025-04-12 | End: 2025-04-14

## 2025-04-12 RX ADMIN — OXYCODONE HYDROCHLORIDE 5 MILLIGRAM(S): 30 TABLET ORAL at 20:48

## 2025-04-12 RX ADMIN — OXYCODONE HYDROCHLORIDE 5 MILLIGRAM(S): 30 TABLET ORAL at 16:30

## 2025-04-12 RX ADMIN — Medication 650 MILLIGRAM(S): at 06:40

## 2025-04-12 RX ADMIN — Medication 40 MILLIGRAM(S): at 06:40

## 2025-04-12 RX ADMIN — Medication 2 TABLET(S): at 20:48

## 2025-04-12 RX ADMIN — BUDESONIDE 0.5 MILLIGRAM(S): 90 AEROSOL, POWDER RESPIRATORY (INHALATION) at 21:13

## 2025-04-12 RX ADMIN — ENOXAPARIN SODIUM 40 MILLIGRAM(S): 100 INJECTION SUBCUTANEOUS at 23:18

## 2025-04-12 RX ADMIN — BUDESONIDE 0.5 MILLIGRAM(S): 90 AEROSOL, POWDER RESPIRATORY (INHALATION) at 07:58

## 2025-04-12 RX ADMIN — POLYETHYLENE GLYCOL 3350 17 GRAM(S): 17 POWDER, FOR SOLUTION ORAL at 17:38

## 2025-04-12 RX ADMIN — OXYCODONE HYDROCHLORIDE 5 MILLIGRAM(S): 30 TABLET ORAL at 08:52

## 2025-04-12 RX ADMIN — Medication 100 MILLIGRAM(S): at 06:40

## 2025-04-12 NOTE — OCCUPATIONAL THERAPY INITIAL EVALUATION ADULT - ADDITIONAL COMMENTS
Pt. reports he lives with wife daughter and grandson in  with 3STE +HR. 2 level home with bed/bath on second level. Tub shower with no DME. Reporting independence with ADLs, IADLs and ambulation without device prior to admission. Owns RW

## 2025-04-12 NOTE — CHART NOTE - NSCHARTNOTEFT_GEN_A_CORE
Pt seen at bedside to measure and evaluate for carbon fiiber afo ordered by Dr Casanova.  Awaiting therapy evaluation to determine next step of rehab after surgery to left hip.  Pt daughter states he has had the dropp foot for an extended amount of time and has fallen .  Measurements taken today. Family aware of authorization process.  Please contact me if needed:  [email or web address] on TEAMS    [employer]  [phone] [employer].

## 2025-04-12 NOTE — PHYSICAL THERAPY INITIAL EVALUATION ADULT - RANGE OF MOTION EXAMINATION, REHAB EVAL
Left LE hip abduction 15 degrees, hip flexion to 45 degrees only with guarding due to pain/Left UE ROM was WNL (within normal limits)/Right UE ROM was WNL (within normal limits)/Right LE ROM was WNL (within normal limits)/deficits as listed below

## 2025-04-12 NOTE — OCCUPATIONAL THERAPY INITIAL EVALUATION ADULT - GENERAL OBSERVATIONS, REHAB EVAL
Pt. received semi-supine in bed +cardiac monitor +PIV A&Ox4 with forgetfulness, family present bedside. Agreeable to OT IE. Pt. tolerated OT well, transferring with moderate assist +RW. Pt. left seated in recliner chair with all needs met, all safety measures in place, family present bedside. Posterior hip precautions maintained t/o.

## 2025-04-12 NOTE — PHYSICAL THERAPY INITIAL EVALUATION ADULT - NSACTIVITYREC_GEN_A_PT
Pt will benefit from acute rehabilitation following d/c from acute hospitalization to optimize functional mobility and independence for a safe d/c home.

## 2025-04-12 NOTE — PROGRESS NOTE ADULT - NSPROGADDITIONALINFOA_GEN_ALL_CORE
Patient doing well POD1  Pain controlled with oral meds  Vitals and labs appropriate  Post op xrays reviewed  Patient at NV baseline  Awaiting carbon fiber AFO for chronic foot drop LLE  PT eval  Dispo likely to [facility]  Care per medicine team  DVT ppx    DLS

## 2025-04-12 NOTE — PHYSICAL THERAPY INITIAL EVALUATION ADULT - GENERAL OBSERVATIONS, REHAB EVAL
57 Pt. received seated in bedside chair +cardiac monitor +PIV A&Ox4 with forgetfulness, family present bedside. Agreeable to OT IE. Pt. tolerated OT well, transferring with moderate assist +RW. Pt. left seated in recliner chair with all needs met, all safety measures in place, family present bedside. Posterior hip precautions maintained t/o.

## 2025-04-12 NOTE — OCCUPATIONAL THERAPY INITIAL EVALUATION ADULT - NSACTIVITYREC_GEN_A_OT
Pt. will benefit from skilled OT services at inpatient rehab prior to discharge home to address strength, balance, coordination and functional transfers to improve independence with ADL/IADL tasks.   Recommending OOB to recliner chair and bedside commode with moderate assist x1 +RW.

## 2025-04-12 NOTE — PROGRESS NOTE ADULT - SUBJECTIVE AND OBJECTIVE BOX
Patient is a 79y old  Male who presents with a chief complaint of fall (12 Apr 2025 09:03)    SUBJECTIVE / OVERNIGHT EVENTS: patient seen and examined. Daughter at bedside. Patient denies pain. Feels well. Understands that next step is to work with PT/OT    MEDICATIONS  (STANDING):  buDESOnide    Inhalation Suspension 0.5 milliGRAM(s) Inhalation two times a day  enoxaparin Injectable 40 milliGRAM(s) SubCutaneous every 24 hours  pantoprazole    Tablet 40 milliGRAM(s) Oral before breakfast  polyethylene glycol 3350 17 Gram(s) Oral two times a day  senna 2 Tablet(s) Oral at bedtime  valsartan 40 milliGRAM(s) Oral daily    MEDICATIONS  (PRN):  acetaminophen     Tablet .. 650 milliGRAM(s) Oral every 6 hours PRN Temp greater or equal to 38C (100.4F), Mild Pain (1 - 3)  albuterol    90 MICROgram(s) HFA Inhaler 2 Puff(s) Inhalation every 6 hours PRN Shortness of Breath  HYDROmorphone  Injectable 1 milliGRAM(s) IV Push every 4 hours PRN Severe Pain (7 - 10)  ondansetron Injectable 4 milliGRAM(s) IV Push every 8 hours PRN Nausea and/or Vomiting  oxyCODONE    IR 5 milliGRAM(s) Oral every 4 hours PRN Moderate Pain (4 - 6)    Vital Signs Last 24 Hrs  T(C): 36.4 (12 Apr 2025 10:00), Max: 37.1 (11 Apr 2025 12:16)  T(F): 97.6 (12 Apr 2025 10:00), Max: 98.8 (11 Apr 2025 12:16)  HR: 84 (12 Apr 2025 10:00) (71 - 100)  BP: 104/52 (12 Apr 2025 10:00) (104/52 - 167/81)  BP(mean): 78 (11 Apr 2025 17:30) (78 - 78)  RR: 17 (12 Apr 2025 10:00) (12 - 19)  SpO2: 94% (12 Apr 2025 10:00) (92% - 95%)    Parameters below as of 12 Apr 2025 10:00  Patient On (Oxygen Delivery Method): nasal cannula      PHYSICAL EXAM:  GENERAL: NAD, well-developed  HEAD:  Atraumatic, Normocephalic  EYES: EOMI, PERRLA, conjunctiva and sclera clear  NECK: Supple, No JVD  CHEST/LUNG: Clear to auscultation bilaterally; No wheeze  HEART: Regular rate and rhythm; No murmurs, rubs, or gallops  ABDOMEN: Soft, Nontender, Nondistended; Bowel sounds present  EXTREMITIES:  left hip dressing c/d/i  PSYCH: AAOx3  NEUROLOGY: non-focal  SKIN: No rashes or lesions    LABS:                        11.7   13.99 )-----------( 206      ( 12 Apr 2025 05:44 )             35.5     04-12    132[L]  |  100  |  18  ----------------------------<  149[H]  4.2   |  26  |  1.17    Ca    7.8[L]      12 Apr 2025 05:44    TPro  5.5[L]  /  Alb  2.6[L]  /  TBili  0.7  /  DBili  x   /  AST  22  /  ALT  13  /  AlkPhos  62  04-12    PT/INR - ( 10 Apr 2025 12:11 )   PT: 11.8 sec;   INR: 1.00 ratio         PTT - ( 10 Apr 2025 12:11 )  PTT:31.1 sec      Urinalysis Basic - ( 12 Apr 2025 05:44 )    Color: x / Appearance: x / SG: x / pH: x  Gluc: 149 mg/dL / Ketone: x  / Bili: x / Urobili: x   Blood: x / Protein: x / Nitrite: x   Leuk Esterase: x / RBC: x / WBC x   Sq Epi: x / Non Sq Epi: x / Bacteria: x        RADIOLOGY & ADDITIONAL TESTS:    Imaging Personally Reviewed:    Consultant(s) Notes Reviewed:      Care Discussed with Consultants/Other Providers: ortho    Assessment and Plan:

## 2025-04-12 NOTE — OCCUPATIONAL THERAPY INITIAL EVALUATION ADULT - SITTING BALANCE: DYNAMIC
Bloodwork orders placed to [facility] lab for upcoming physical per protocol. good balance

## 2025-04-12 NOTE — PROGRESS NOTE ADULT - SUBJECTIVE AND OBJECTIVE BOX
INTERVAL HPI:  Pt seen and examined at bedside resting comfortably  Endorses moderate L hip pain- got oxy 5 from RN  Denies subj fever/chills, chest pain, SOB     Vital Signs Last 24 Hrs  T(C): 36.9 (12 Apr 2025 05:35), Max: 37.1 (11 Apr 2025 12:16)  T(F): 98.5 (12 Apr 2025 05:35), Max: 98.8 (11 Apr 2025 12:16)  HR: 95 (12 Apr 2025 07:58) (71 - 100)  BP: 108/62 (12 Apr 2025 05:35) (108/62 - 167/81)  BP(mean): 78 (11 Apr 2025 17:30) (78 - 78)  RR: 17 (12 Apr 2025 05:35) (12 - 19)  SpO2: 92% (12 Apr 2025 07:58) (92% - 95%)    Parameters below as of 12 Apr 2025 07:58  Patient On (Oxygen Delivery Method): room air    PHYSICAL EXAM:  General: NAD  Skin: Warm, dry  Head: Atraumatic, normocephalic  Eyes: EOMI, PERRLA, conjunctiva and sclera clear  Chest/Lungs: Nonlabored breathing  Abdomen: Soft, nontender  : No suprapubic tenderness  Extremities: L hip aquacel CDI. Lt high with mild swelling, no ecchymosis. Gastrocnemius contracture of LLE present. No EHL, tibialis anterior function (pt states this is his baseline). LLE is NVI.    I&O's Detail    11 Apr 2025 07:01  -  12 Apr 2025 07:00  --------------------------------------------------------  IN:    Lactated Ringers: 150 mL  Total IN: 150 mL    OUT:    Voided (mL): 775 mL  Total OUT: 775 mL    Total NET: -625 mL    LABS:                        11.7   13.99 )-----------( 206      ( 12 Apr 2025 05:44 )             35.5     04-12    132[L]  |  100  |  18  ----------------------------<  149[H]  4.2   |  26  |  1.17    Ca    7.8[L]      12 Apr 2025 05:44    TPro  5.5[L]  /  Alb  2.6[L]  /  TBili  0.7  /  DBili  x   /  AST  22  /  ALT  13  /  AlkPhos  62  04-12    PT/INR - ( 10 Apr 2025 12:11 )   PT: 11.8 sec;   INR: 1.00 ratio        ASSESSMENT:  78y/o Male POD #1 s/p L hip hemiarthroplasty recovering well    PLAN:  Pain control PRN   VTE ppx with lovenox  WBAT with PT- eval to be performed today  Trend H/H   Posterior hip precautions   Maintain abduction pillow     Discussed with Dr. Casanova, Orthopedics INTERVAL HPI:  Pt seen and examined at bedside resting comfortably  Endorses moderate L hip pain- got oxy 5 from RN  Denies subj fever/chills, chest pain, SOB     Vital Signs Last 24 Hrs  T(C): 36.9 (12 Apr 2025 05:35), Max: 37.1 (11 Apr 2025 12:16)  T(F): 98.5 (12 Apr 2025 05:35), Max: 98.8 (11 Apr 2025 12:16)  HR: 95 (12 Apr 2025 07:58) (71 - 100)  BP: 108/62 (12 Apr 2025 05:35) (108/62 - 167/81)  BP(mean): 78 (11 Apr 2025 17:30) (78 - 78)  RR: 17 (12 Apr 2025 05:35) (12 - 19)  SpO2: 92% (12 Apr 2025 07:58) (92% - 95%)    Parameters below as of 12 Apr 2025 07:58  Patient On (Oxygen Delivery Method): room air    PHYSICAL EXAM:  General: NAD  Skin: Warm, dry  Head: Atraumatic, normocephalic  Eyes: EOMI, PERRLA, conjunctiva and sclera clear  Chest/Lungs: Nonlabored breathing  Abdomen: Soft, nontender  : No suprapubic tenderness  Extremities: L hip aquacel CDI. Lt high with mild swelling, no ecchymosis. Gastrocnemius contracture of LLE present. No EHL, tibialis anterior function (pt states this is his baseline). LLE is NVI.    I&O's Detail    11 Apr 2025 07:01  -  12 Apr 2025 07:00  --------------------------------------------------------  IN:    Lactated Ringers: 150 mL  Total IN: 150 mL    OUT:    Voided (mL): 775 mL  Total OUT: 775 mL    Total NET: -625 mL    LABS:                        11.7   13.99 )-----------( 206      ( 12 Apr 2025 05:44 )             35.5     04-12    132[L]  |  100  |  18  ----------------------------<  149[H]  4.2   |  26  |  1.17    Ca    7.8[L]      12 Apr 2025 05:44    TPro  5.5[L]  /  Alb  2.6[L]  /  TBili  0.7  /  DBili  x   /  AST  22  /  ALT  13  /  AlkPhos  62  04-12    PT/INR - ( 10 Apr 2025 12:11 )   PT: 11.8 sec;   INR: 1.00 ratio        ASSESSMENT:  80y/o Male POD #1 s/p L hip hemiarthroplasty recovering well    PLAN:  Pain control PRN   VTE ppx with lovenox  WBAT with PT- eval to be performed today. Pt will also work on dorsiflexion with pt to prevent calf wasting  Trend H/H   Posterior hip precautions   Maintain abduction pillow     Discussed with Dr. Casanova, Orthopedics INTERVAL HPI:  Pt seen and examined at bedside resting comfortably  Endorses moderate L hip pain- got oxy 5 from RN  Denies subj fever/chills, chest pain, SOB     Vital Signs Last 24 Hrs  T(C): 36.9 (12 Apr 2025 05:35), Max: 37.1 (11 Apr 2025 12:16)  T(F): 98.5 (12 Apr 2025 05:35), Max: 98.8 (11 Apr 2025 12:16)  HR: 95 (12 Apr 2025 07:58) (71 - 100)  BP: 108/62 (12 Apr 2025 05:35) (108/62 - 167/81)  BP(mean): 78 (11 Apr 2025 17:30) (78 - 78)  RR: 17 (12 Apr 2025 05:35) (12 - 19)  SpO2: 92% (12 Apr 2025 07:58) (92% - 95%)    Parameters below as of 12 Apr 2025 07:58  Patient On (Oxygen Delivery Method): room air    PHYSICAL EXAM:  General: NAD  Skin: Warm, dry  Head: Atraumatic, normocephalic  Eyes: EOMI, PERRLA, conjunctiva and sclera clear  Chest/Lungs: Nonlabored breathing  Abdomen: Soft, nontender  : No suprapubic tenderness  Extremities: L hip aquacel CDI. Lt high with mild swelling, no ecchymosis. SCDs in place. No EHL, tibialis anterior function (pt states this is his baseline). LLE is NVI.    I&O's Detail    11 Apr 2025 07:01  -  12 Apr 2025 07:00  --------------------------------------------------------  IN:    Lactated Ringers: 150 mL  Total IN: 150 mL    OUT:    Voided (mL): 775 mL  Total OUT: 775 mL    Total NET: -625 mL    LABS:                        11.7   13.99 )-----------( 206      ( 12 Apr 2025 05:44 )             35.5     04-12    132[L]  |  100  |  18  ----------------------------<  149[H]  4.2   |  26  |  1.17    Ca    7.8[L]      12 Apr 2025 05:44    TPro  5.5[L]  /  Alb  2.6[L]  /  TBili  0.7  /  DBili  x   /  AST  22  /  ALT  13  /  AlkPhos  62  04-12    PT/INR - ( 10 Apr 2025 12:11 )   PT: 11.8 sec;   INR: 1.00 ratio        ASSESSMENT:  78y/o Male POD #1 s/p L hip hemiarthroplasty recovering well    PLAN:  Pain control PRN   VTE ppx with lovenox  WBAT with PT- eval to be performed today. Pt will also work on dorsiflexion with pt to prevent calf wasting  Trend H/H   Posterior hip precautions   Maintain abduction pillow   Cont SCDs    Discussed with Dr. Casanova, Orthopedics INTERVAL HPI:  Pt seen and examined at bedside resting comfortably  Endorses moderate L hip pain- got oxy 5 from RN  Denies subj fever/chills, chest pain, SOB     Vital Signs Last 24 Hrs  T(C): 36.9 (12 Apr 2025 05:35), Max: 37.1 (11 Apr 2025 12:16)  T(F): 98.5 (12 Apr 2025 05:35), Max: 98.8 (11 Apr 2025 12:16)  HR: 95 (12 Apr 2025 07:58) (71 - 100)  BP: 108/62 (12 Apr 2025 05:35) (108/62 - 167/81)  BP(mean): 78 (11 Apr 2025 17:30) (78 - 78)  RR: 17 (12 Apr 2025 05:35) (12 - 19)  SpO2: 92% (12 Apr 2025 07:58) (92% - 95%)    Parameters below as of 12 Apr 2025 07:58  Patient On (Oxygen Delivery Method): room air    PHYSICAL EXAM:  General: NAD  Skin: Warm, dry  Head: Atraumatic, normocephalic  Eyes: EOMI, PERRLA, conjunctiva and sclera clear  Chest/Lungs: Nonlabored breathing  Abdomen: Soft, nontender  : No suprapubic tenderness  Extremities: L hip aquacel CDI. Lt high with mild swelling, no ecchymosis. SCDs in place. No EHL, tibialis anterior function (pt states this is his baseline). LLE is NVI.    I&O's Detail    11 Apr 2025 07:01  -  12 Apr 2025 07:00  --------------------------------------------------------  IN:    Lactated Ringers: 150 mL  Total IN: 150 mL    OUT:    Voided (mL): 775 mL  Total OUT: 775 mL    Total NET: -625 mL    LABS:                        11.7   13.99 )-----------( 206      ( 12 Apr 2025 05:44 )             35.5     04-12    132[L]  |  100  |  18  ----------------------------<  149[H]  4.2   |  26  |  1.17    Ca    7.8[L]      12 Apr 2025 05:44    TPro  5.5[L]  /  Alb  2.6[L]  /  TBili  0.7  /  DBili  x   /  AST  22  /  ALT  13  /  AlkPhos  62  04-12    PT/INR - ( 10 Apr 2025 12:11 )   PT: 11.8 sec;   INR: 1.00 ratio        ASSESSMENT:  78y/o Male POD #1 s/p L hip hemiarthroplasty recovering well    PLAN:  Ordered carbon fiber AFO for L chronic foot drop  Pain control PRN   VTE ppx with lovenox  WBAT with PT- eval to be performed today. Pt will also work on dorsiflexion with pt to prevent calf wasting  Trend H/H   Posterior hip precautions   Maintain abduction pillow   Cont SCDs    Discussed with Dr. Casanova, Orthopedics

## 2025-04-12 NOTE — PHYSICAL THERAPY INITIAL EVALUATION ADULT - PERTINENT HX OF CURRENT PROBLEM, REHAB EVAL
78yo male w/ hx of Asthma, HTN, presented to the ED accompanied by his wife s/p fall in the driveway while using a leaf blower to clean the driveway. Pt not sure how he lost his balance but landed on his left side w/o LOC. Pt's wife was present and states herself she is not sure how he fell as well. But no LOC or syncopal episode. Pt was unable to move nor was wife able to help him up due to pain in her left leg. Pt denies prior to falling any episode of cp, palpitations, sob, abd pain, N/V, fever, chills. Pt with resultant femoral neck fx and is s/p left hemiarthroplasty on 4/11/25.

## 2025-04-12 NOTE — PROGRESS NOTE ADULT - ASSESSMENT
80yo male w/ hx of Asthma, HTN, presented to the ED accompanied by his wife s/p fall in the driveway while using a leaf blower to clean the driveway, noted on imaging to have Left femoral Neck fracture    #Fall  #Left Femoral neck fracture  -s/p L hip hemiarthroplasty 4/11  -Pain control  -management per ortho; WBAT  -PT/OT/PMR eval  -chronic foot drop - per ortho- carbon fiber AFO. 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

## 2025-04-12 NOTE — OCCUPATIONAL THERAPY INITIAL EVALUATION ADULT - MD ORDER
MD orders received, chart reviewed, contents noted. RN cleared for OT IE. IE completed, please see below for findings.

## 2025-04-13 LAB
ANION GAP SERPL CALC-SCNC: 6 MMOL/L — SIGNIFICANT CHANGE UP (ref 5–17)
BUN SERPL-MCNC: 24 MG/DL — HIGH (ref 7–23)
CALCIUM SERPL-MCNC: 7.8 MG/DL — LOW (ref 8.4–10.5)
CHLORIDE SERPL-SCNC: 101 MMOL/L — SIGNIFICANT CHANGE UP (ref 96–108)
CO2 SERPL-SCNC: 27 MMOL/L — SIGNIFICANT CHANGE UP (ref 22–31)
CREAT SERPL-MCNC: 1.14 MG/DL — SIGNIFICANT CHANGE UP (ref 0.5–1.3)
EGFR: 64 ML/MIN/1.73M2 — SIGNIFICANT CHANGE UP
EGFR: 64 ML/MIN/1.73M2 — SIGNIFICANT CHANGE UP
GLUCOSE SERPL-MCNC: 134 MG/DL — HIGH (ref 70–99)
HCT VFR BLD CALC: 31.5 % — LOW (ref 39–50)
HGB BLD-MCNC: 10.9 G/DL — LOW (ref 13–17)
MCHC RBC-ENTMCNC: 30.1 PG — SIGNIFICANT CHANGE UP (ref 27–34)
MCHC RBC-ENTMCNC: 34.6 G/DL — SIGNIFICANT CHANGE UP (ref 32–36)
MCV RBC AUTO: 87 FL — SIGNIFICANT CHANGE UP (ref 80–100)
NRBC BLD AUTO-RTO: 0 /100 WBCS — SIGNIFICANT CHANGE UP (ref 0–0)
PLATELET # BLD AUTO: 195 K/UL — SIGNIFICANT CHANGE UP (ref 150–400)
POTASSIUM SERPL-MCNC: 3.5 MMOL/L — SIGNIFICANT CHANGE UP (ref 3.5–5.3)
POTASSIUM SERPL-SCNC: 3.5 MMOL/L — SIGNIFICANT CHANGE UP (ref 3.5–5.3)
RBC # BLD: 3.62 M/UL — LOW (ref 4.2–5.8)
RBC # FLD: 13.2 % — SIGNIFICANT CHANGE UP (ref 10.3–14.5)
SODIUM SERPL-SCNC: 134 MMOL/L — LOW (ref 135–145)
WBC # BLD: 9.74 K/UL — SIGNIFICANT CHANGE UP (ref 3.8–10.5)
WBC # FLD AUTO: 9.74 K/UL — SIGNIFICANT CHANGE UP (ref 3.8–10.5)

## 2025-04-13 PROCEDURE — 99232 SBSQ HOSP IP/OBS MODERATE 35: CPT

## 2025-04-13 RX ORDER — OXYCODONE HYDROCHLORIDE 30 MG/1
2.5 TABLET ORAL EVERY 4 HOURS
Refills: 0 | Status: DISCONTINUED | OUTPATIENT
Start: 2025-04-13 | End: 2025-04-14

## 2025-04-13 RX ORDER — ACETAMINOPHEN 500 MG/5ML
975 LIQUID (ML) ORAL EVERY 8 HOURS
Refills: 0 | Status: DISCONTINUED | OUTPATIENT
Start: 2025-04-13 | End: 2025-04-14

## 2025-04-13 RX ORDER — OXYCODONE HYDROCHLORIDE 30 MG/1
5 TABLET ORAL EVERY 4 HOURS
Refills: 0 | Status: DISCONTINUED | OUTPATIENT
Start: 2025-04-13 | End: 2025-04-14

## 2025-04-13 RX ADMIN — POLYETHYLENE GLYCOL 3350 17 GRAM(S): 17 POWDER, FOR SOLUTION ORAL at 05:21

## 2025-04-13 RX ADMIN — Medication 40 MILLIGRAM(S): at 05:21

## 2025-04-13 RX ADMIN — OXYCODONE HYDROCHLORIDE 5 MILLIGRAM(S): 30 TABLET ORAL at 09:17

## 2025-04-13 RX ADMIN — Medication 975 MILLIGRAM(S): at 14:39

## 2025-04-13 RX ADMIN — OXYCODONE HYDROCHLORIDE 2.5 MILLIGRAM(S): 30 TABLET ORAL at 19:44

## 2025-04-13 RX ADMIN — OXYCODONE HYDROCHLORIDE 5 MILLIGRAM(S): 30 TABLET ORAL at 12:39

## 2025-04-13 RX ADMIN — BUDESONIDE 0.5 MILLIGRAM(S): 90 AEROSOL, POWDER RESPIRATORY (INHALATION) at 18:03

## 2025-04-13 RX ADMIN — OXYCODONE HYDROCHLORIDE 2.5 MILLIGRAM(S): 30 TABLET ORAL at 20:14

## 2025-04-13 RX ADMIN — Medication 2 TABLET(S): at 21:58

## 2025-04-13 RX ADMIN — OXYCODONE HYDROCHLORIDE 5 MILLIGRAM(S): 30 TABLET ORAL at 13:15

## 2025-04-13 RX ADMIN — Medication 40 MILLIGRAM(S): at 05:22

## 2025-04-13 RX ADMIN — POLYETHYLENE GLYCOL 3350 17 GRAM(S): 17 POWDER, FOR SOLUTION ORAL at 17:20

## 2025-04-13 RX ADMIN — Medication 975 MILLIGRAM(S): at 21:58

## 2025-04-13 RX ADMIN — ENOXAPARIN SODIUM 40 MILLIGRAM(S): 100 INJECTION SUBCUTANEOUS at 22:04

## 2025-04-13 RX ADMIN — Medication 975 MILLIGRAM(S): at 15:16

## 2025-04-13 RX ADMIN — Medication 975 MILLIGRAM(S): at 22:28

## 2025-04-13 RX ADMIN — Medication 2 PUFF(S): at 17:56

## 2025-04-13 RX ADMIN — OXYCODONE HYDROCHLORIDE 5 MILLIGRAM(S): 30 TABLET ORAL at 08:38

## 2025-04-13 NOTE — PROGRESS NOTE ADULT - ASSESSMENT
80yo male w/ hx of Asthma, HTN, presented to the ED accompanied by his wife s/p fall in the driveway while using a leaf blower to clean the driveway, noted on imaging to have Left femoral Neck fracture    #Fall  #Left Femoral neck fracture  -s/p L hip hemiarthroplasty 4/11  -Pain control - tylenol ATC and oxyIR PRN  -management per ortho; WBAT  -PT/OT/PMR eval  -chronic foot drop - per ortho- carbon fiber AFO. 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   Hope to DC to Acute rehab in AM

## 2025-04-13 NOTE — PROGRESS NOTE ADULT - SUBJECTIVE AND OBJECTIVE BOX
Patient is a 79y old  Male who presents with a chief complaint of fall (12 Apr 2025 09:03)    SUBJECTIVE / OVERNIGHT EVENTS: patient seen and examined. Wife at bedside. Patient reports no pain in left hip but has pain on left foot dorsum. No other complaints. Amenable to PMR evaluation.     MEDICATIONS  (STANDING):  acetaminophen     Tablet .. 975 milliGRAM(s) Oral every 8 hours  buDESOnide    Inhalation Suspension 0.5 milliGRAM(s) Inhalation two times a day  enoxaparin Injectable 40 milliGRAM(s) SubCutaneous every 24 hours  pantoprazole    Tablet 40 milliGRAM(s) Oral before breakfast  polyethylene glycol 3350 17 Gram(s) Oral two times a day  senna 2 Tablet(s) Oral at bedtime  valsartan 40 milliGRAM(s) Oral daily    MEDICATIONS  (PRN):  albuterol    90 MICROgram(s) HFA Inhaler 2 Puff(s) Inhalation every 6 hours PRN Shortness of Breath  ondansetron Injectable 4 milliGRAM(s) IV Push every 8 hours PRN Nausea and/or Vomiting  oxyCODONE    IR 5 milliGRAM(s) Oral every 4 hours PRN Severe Pain (7 - 10)  oxyCODONE    IR 2.5 milliGRAM(s) Oral every 4 hours PRN Moderate Pain (4 - 6)    Vital Signs Last 24 Hrs  T(C): 37.4 (13 Apr 2025 05:27), Max: 37.4 (13 Apr 2025 05:27)  T(F): 99.3 (13 Apr 2025 05:27), Max: 99.3 (13 Apr 2025 05:27)  HR: 86 (13 Apr 2025 08:24) (86 - 95)  BP: 128/53 (13 Apr 2025 05:27) (106/57 - 128/53)  BP(mean): --  RR: 18 (13 Apr 2025 07:31) (16 - 18)  SpO2: 92% (13 Apr 2025 08:24) (92% - 96%)    Parameters below as of 13 Apr 2025 08:24  Patient On (Oxygen Delivery Method): room air      PHYSICAL EXAM:  GENERAL: NAD, well-developed  HEAD:  Atraumatic, Normocephalic  EYES: EOMI, PERRLA, conjunctiva and sclera clear  NECK: Supple, No JVD  CHEST/LUNG: Clear to auscultation bilaterally; No wheeze  HEART: Regular rate and rhythm; No murmurs, rubs, or gallops  ABDOMEN: Soft, Nontender, Nondistended; Bowel sounds present  EXTREMITIES:  left hip dressing c/d/i  PSYCH: AAOx3  NEUROLOGY: non-focal  SKIN: No rashes or lesions    LABS:                                   10.9   9.74  )-----------( 195      ( 13 Apr 2025 05:54 )             31.5   04-13    134[L]  |  101  |  24[H]  ----------------------------<  134[H]  3.5   |  27  |  1.14    Ca    7.8[L]      13 Apr 2025 05:54    TPro  5.5[L]  /  Alb  2.6[L]  /  TBili  0.7  /  DBili  x   /  AST  22  /  ALT  13  /  AlkPhos  62  04-12        Urinalysis Basic - ( 12 Apr 2025 05:44 )    Color: x / Appearance: x / SG: x / pH: x  Gluc: 149 mg/dL / Ketone: x  / Bili: x / Urobili: x   Blood: x / Protein: x / Nitrite: x   Leuk Esterase: x / RBC: x / WBC x   Sq Epi: x / Non Sq Epi: x / Bacteria: x        RADIOLOGY & ADDITIONAL TESTS:    Imaging Personally Reviewed:    Consultant(s) Notes Reviewed:      Care Discussed with Consultants/Other Providers: ortho    Assessment and Plan:

## 2025-04-13 NOTE — PROGRESS NOTE ADULT - SUBJECTIVE AND OBJECTIVE BOX
POD #2 L hemiarthroplasty/ fem neck fx    pt seen and examined at bedside no acute events overnight no new complaints pain contolled no SOB/CP afebrile voiding tolerating diet    Vital Signs Last 24 Hrs  T(C): 37.4 (13 Apr 2025 05:27), Max: 37.4 (13 Apr 2025 05:27)  T(F): 99.3 (13 Apr 2025 05:27), Max: 99.3 (13 Apr 2025 05:27)  HR: 86 (13 Apr 2025 05:27) (84 - 95)  BP: 128/53 (13 Apr 2025 05:27) (104/52 - 128/53)  BP(mean): --  RR: 18 (13 Apr 2025 07:31) (16 - 18)  SpO2: 92% (13 Apr 2025 07:31) (92% - 96%)    Parameters below as of 13 Apr 2025 07:31  Patient On (Oxygen Delivery Method): room air    PE: LHip: supple aquacell intact moderate drainage noted no obvious signs of hematoma    LLE: + DP sensory intact + foot drop gastroc contracture -EHL - AROM dorsiflexion                           10.9   9.74  )-----------( 195      ( 13 Apr 2025 05:54 )             31.5     04-13    134[L]  |  101  |  24[H]  ----------------------------<  134[H]  3.5   |  27  |  1.14    Ca    7.8[L]      13 Apr 2025 05:54    TPro  5.5[L]  /  Alb  2.6[L]  /  TBili  0.7  /  DBili  x   /  AST  22  /  ALT  13  /  AlkPhos  62  04-12    I&O's Detail    12 Apr 2025 07:01  -  13 Apr 2025 07:00  --------------------------------------------------------  IN:  Total IN: 0 mL    OUT:    Voided (mL): 850 mL  Total OUT: 850 mL    Total NET: -850 mL           POD #2 L hemiarthroplasty/ fem neck fx    pt seen and examined at bedside no acute events overnight no new complaints pain controlled no SOB/CP afebrile voiding tolerating diet    Vital Signs Last 24 Hrs  T(C): 37.4 (13 Apr 2025 05:27), Max: 37.4 (13 Apr 2025 05:27)  T(F): 99.3 (13 Apr 2025 05:27), Max: 99.3 (13 Apr 2025 05:27)  HR: 86 (13 Apr 2025 05:27) (84 - 95)  BP: 128/53 (13 Apr 2025 05:27) (104/52 - 128/53)  BP(mean): --  RR: 18 (13 Apr 2025 07:31) (16 - 18)  SpO2: 92% (13 Apr 2025 07:31) (92% - 96%)    Parameters below as of 13 Apr 2025 07:31  Patient On (Oxygen Delivery Method): room air    PE: LHip: supple aquacell intact moderate drainage noted no obvious signs of hematoma,   posterior ecchymosis/ bruising    LLE: + DP sensory intact + foot drop gastroc contracture -EHL - AROM dorsiflexion                           10.9   9.74  )-----------( 195      ( 13 Apr 2025 05:54 )             31.5     04-13    134[L]  |  101  |  24[H]  ----------------------------<  134[H]  3.5   |  27  |  1.14    Ca    7.8[L]      13 Apr 2025 05:54    TPro  5.5[L]  /  Alb  2.6[L]  /  TBili  0.7  /  DBili  x   /  AST  22  /  ALT  13  /  AlkPhos  62  04-12    I&O's Detail    12 Apr 2025 07:01  -  13 Apr 2025 07:00  --------------------------------------------------------  IN:  Total IN: 0 mL    OUT:    Voided (mL): 850 mL  Total OUT: 850 mL    Total NET: -850 mL

## 2025-04-13 NOTE — PROGRESS NOTE ADULT - ASSESSMENT
POD #2 L hemiarthroplasty/ fem neck fx/ foot drop    -PT/OT WBAT  -fitted for brace with vijay brady on 4/12 awaiting brace placement for l foot  -DVT Prophylaxis  -pain regimen  -IS  -awaiting d/c to [facility]  -continue current management  - change aquacell dressing todaY

## 2025-04-13 NOTE — PROGRESS NOTE ADULT - NSPROGADDITIONALINFOA_GEN_ALL_CORE
Patient doing well   Mild oozing from incision - Aquacel dressing changed. If ongoing drainage, consider placement of michael vac for next 7 days and change lovenox to hep  Vitals and labs appropriate  Post op xrays reviewed  Patient at NV baseline  Pain improving  Awaiting carbon fiber AFO for chronic foot drop LLE  PT eval  Dispo likely to [facility]  Discussed with Marlinda orthotist plan for AFO in setting of posterior hip precautions    DLS

## 2025-04-14 ENCOUNTER — TRANSCRIPTION ENCOUNTER (OUTPATIENT)
Age: 80
End: 2025-04-14

## 2025-04-14 ENCOUNTER — INPATIENT (INPATIENT)
Facility: HOSPITAL | Age: 80
LOS: 14 days | Discharge: HOME HEALTH | DRG: 560 | End: 2025-04-29
Attending: INTERNAL MEDICINE | Admitting: INTERNAL MEDICINE
Payer: MEDICARE

## 2025-04-14 VITALS
OXYGEN SATURATION: 94 % | SYSTOLIC BLOOD PRESSURE: 125 MMHG | DIASTOLIC BLOOD PRESSURE: 62 MMHG | RESPIRATION RATE: 18 BRPM | HEART RATE: 94 BPM | TEMPERATURE: 99 F

## 2025-04-14 VITALS
HEIGHT: 64 IN | SYSTOLIC BLOOD PRESSURE: 141 MMHG | WEIGHT: 170.86 LBS | OXYGEN SATURATION: 97 % | DIASTOLIC BLOOD PRESSURE: 65 MMHG | HEART RATE: 76 BPM | TEMPERATURE: 99 F | RESPIRATION RATE: 16 BRPM

## 2025-04-14 DIAGNOSIS — Z98.89 OTHER SPECIFIED POSTPROCEDURAL STATES: Chronic | ICD-10-CM

## 2025-04-14 DIAGNOSIS — S72.146A NONDISPLACED INTERTROCHANTERIC FRACTURE OF UNSPECIFIED FEMUR, INITIAL ENCOUNTER FOR CLOSED FRACTURE: ICD-10-CM

## 2025-04-14 LAB
ANION GAP SERPL CALC-SCNC: 5 MMOL/L — SIGNIFICANT CHANGE UP (ref 5–17)
BUN SERPL-MCNC: 20 MG/DL — SIGNIFICANT CHANGE UP (ref 7–23)
CALCIUM SERPL-MCNC: 8 MG/DL — LOW (ref 8.4–10.5)
CHLORIDE SERPL-SCNC: 102 MMOL/L — SIGNIFICANT CHANGE UP (ref 96–108)
CO2 SERPL-SCNC: 27 MMOL/L — SIGNIFICANT CHANGE UP (ref 22–31)
CREAT SERPL-MCNC: 1.27 MG/DL — SIGNIFICANT CHANGE UP (ref 0.5–1.3)
EGFR: 57 ML/MIN/1.73M2 — LOW
EGFR: 57 ML/MIN/1.73M2 — LOW
GLUCOSE SERPL-MCNC: 165 MG/DL — HIGH (ref 70–99)
HCT VFR BLD CALC: 31.7 % — LOW (ref 39–50)
HGB BLD-MCNC: 10.9 G/DL — LOW (ref 13–17)
MCHC RBC-ENTMCNC: 29.9 PG — SIGNIFICANT CHANGE UP (ref 27–34)
MCHC RBC-ENTMCNC: 34.4 G/DL — SIGNIFICANT CHANGE UP (ref 32–36)
MCV RBC AUTO: 87.1 FL — SIGNIFICANT CHANGE UP (ref 80–100)
NRBC BLD AUTO-RTO: 0 /100 WBCS — SIGNIFICANT CHANGE UP (ref 0–0)
PLATELET # BLD AUTO: 221 K/UL — SIGNIFICANT CHANGE UP (ref 150–400)
POTASSIUM SERPL-MCNC: 3.8 MMOL/L — SIGNIFICANT CHANGE UP (ref 3.5–5.3)
POTASSIUM SERPL-SCNC: 3.8 MMOL/L — SIGNIFICANT CHANGE UP (ref 3.5–5.3)
RBC # BLD: 3.64 M/UL — LOW (ref 4.2–5.8)
RBC # FLD: 13.2 % — SIGNIFICANT CHANGE UP (ref 10.3–14.5)
SARS-COV-2 RNA SPEC QL NAA+PROBE: SIGNIFICANT CHANGE UP
SODIUM SERPL-SCNC: 134 MMOL/L — LOW (ref 135–145)
WBC # BLD: 8.85 K/UL — SIGNIFICANT CHANGE UP (ref 3.8–10.5)
WBC # FLD AUTO: 8.85 K/UL — SIGNIFICANT CHANGE UP (ref 3.8–10.5)

## 2025-04-14 PROCEDURE — 97166 OT EVAL MOD COMPLEX 45 MIN: CPT

## 2025-04-14 PROCEDURE — 73552 X-RAY EXAM OF FEMUR 2/>: CPT

## 2025-04-14 PROCEDURE — 82040 ASSAY OF SERUM ALBUMIN: CPT

## 2025-04-14 PROCEDURE — 36415 COLL VENOUS BLD VENIPUNCTURE: CPT

## 2025-04-14 PROCEDURE — 97530 THERAPEUTIC ACTIVITIES: CPT

## 2025-04-14 PROCEDURE — 80053 COMPREHEN METABOLIC PANEL: CPT

## 2025-04-14 PROCEDURE — 99285 EMERGENCY DEPT VISIT HI MDM: CPT

## 2025-04-14 PROCEDURE — 94640 AIRWAY INHALATION TREATMENT: CPT

## 2025-04-14 PROCEDURE — C9399: CPT

## 2025-04-14 PROCEDURE — 99223 1ST HOSP IP/OBS HIGH 75: CPT | Mod: GC

## 2025-04-14 PROCEDURE — 97110 THERAPEUTIC EXERCISES: CPT

## 2025-04-14 PROCEDURE — 86901 BLOOD TYPING SEROLOGIC RH(D): CPT

## 2025-04-14 PROCEDURE — 85025 COMPLETE CBC W/AUTO DIFF WBC: CPT

## 2025-04-14 PROCEDURE — 86850 RBC ANTIBODY SCREEN: CPT

## 2025-04-14 PROCEDURE — 80048 BASIC METABOLIC PNL TOTAL CA: CPT

## 2025-04-14 PROCEDURE — 97161 PT EVAL LOW COMPLEX 20 MIN: CPT

## 2025-04-14 PROCEDURE — 85730 THROMBOPLASTIN TIME PARTIAL: CPT

## 2025-04-14 PROCEDURE — C1776: CPT

## 2025-04-14 PROCEDURE — 73501 X-RAY EXAM HIP UNI 1 VIEW: CPT

## 2025-04-14 PROCEDURE — 93005 ELECTROCARDIOGRAM TRACING: CPT

## 2025-04-14 PROCEDURE — 86900 BLOOD TYPING SEROLOGIC ABO: CPT

## 2025-04-14 PROCEDURE — 94664 DEMO&/EVAL PT USE INHALER: CPT

## 2025-04-14 PROCEDURE — 85027 COMPLETE CBC AUTOMATED: CPT

## 2025-04-14 PROCEDURE — 99239 HOSP IP/OBS DSCHRG MGMT >30: CPT

## 2025-04-14 PROCEDURE — 70360 X-RAY EXAM OF NECK: CPT

## 2025-04-14 PROCEDURE — 94760 N-INVAS EAR/PLS OXIMETRY 1: CPT

## 2025-04-14 PROCEDURE — 73502 X-RAY EXAM HIP UNI 2-3 VIEWS: CPT

## 2025-04-14 PROCEDURE — 71045 X-RAY EXAM CHEST 1 VIEW: CPT

## 2025-04-14 PROCEDURE — 82306 VITAMIN D 25 HYDROXY: CPT

## 2025-04-14 PROCEDURE — 96374 THER/PROPH/DIAG INJ IV PUSH: CPT

## 2025-04-14 PROCEDURE — 97535 SELF CARE MNGMENT TRAINING: CPT

## 2025-04-14 PROCEDURE — 85610 PROTHROMBIN TIME: CPT

## 2025-04-14 RX ORDER — SENNA 187 MG
2 TABLET ORAL
Qty: 0 | Refills: 0 | DISCHARGE
Start: 2025-04-14

## 2025-04-14 RX ORDER — ACETAMINOPHEN 500 MG/5ML
975 LIQUID (ML) ORAL EVERY 8 HOURS
Refills: 0 | Status: COMPLETED | OUTPATIENT
Start: 2025-04-14 | End: 2025-04-17

## 2025-04-14 RX ORDER — BUDESONIDE 0.25 MG/2ML
0.5 SUSPENSION RESPIRATORY (INHALATION)
Refills: 0 | Status: DISCONTINUED | OUTPATIENT
Start: 2025-04-14 | End: 2025-04-22

## 2025-04-14 RX ORDER — HEPARIN SODIUM 1000 [USP'U]/ML
5000 INJECTION INTRAVENOUS; SUBCUTANEOUS EVERY 8 HOURS
Refills: 0 | Status: DISCONTINUED | OUTPATIENT
Start: 2025-04-14 | End: 2025-04-14

## 2025-04-14 RX ORDER — ENOXAPARIN SODIUM 100 MG/ML
40 INJECTION SUBCUTANEOUS
Qty: 0 | Refills: 0 | DISCHARGE
Start: 2025-04-14

## 2025-04-14 RX ORDER — ACETAMINOPHEN 500 MG/5ML
3 LIQUID (ML) ORAL
Qty: 0 | Refills: 0 | DISCHARGE
Start: 2025-04-14

## 2025-04-14 RX ORDER — ALBUTEROL SULFATE 2.5 MG/3ML
2 VIAL, NEBULIZER (ML) INHALATION EVERY 6 HOURS
Refills: 0 | Status: DISCONTINUED | OUTPATIENT
Start: 2025-04-14 | End: 2025-04-29

## 2025-04-14 RX ORDER — POLYETHYLENE GLYCOL 3350 17 G/17G
17 POWDER, FOR SOLUTION ORAL
Refills: 0 | Status: DISCONTINUED | OUTPATIENT
Start: 2025-04-14 | End: 2025-04-29

## 2025-04-14 RX ORDER — BISACODYL 5 MG
10 TABLET, DELAYED RELEASE (ENTERIC COATED) ORAL DAILY
Refills: 0 | Status: DISCONTINUED | OUTPATIENT
Start: 2025-04-14 | End: 2025-04-14

## 2025-04-14 RX ORDER — SENNA 187 MG
2 TABLET ORAL AT BEDTIME
Refills: 0 | Status: DISCONTINUED | OUTPATIENT
Start: 2025-04-14 | End: 2025-04-29

## 2025-04-14 RX ORDER — OXYCODONE HYDROCHLORIDE 30 MG/1
5 TABLET ORAL EVERY 4 HOURS
Refills: 0 | Status: DISCONTINUED | OUTPATIENT
Start: 2025-04-14 | End: 2025-04-18

## 2025-04-14 RX ORDER — POLYETHYLENE GLYCOL 3350 17 G/17G
17 POWDER, FOR SOLUTION ORAL
Qty: 0 | Refills: 0 | DISCHARGE
Start: 2025-04-14

## 2025-04-14 RX ORDER — HEPARIN SODIUM 1000 [USP'U]/ML
5000 INJECTION INTRAVENOUS; SUBCUTANEOUS EVERY 8 HOURS
Refills: 0 | Status: DISCONTINUED | OUTPATIENT
Start: 2025-04-14 | End: 2025-04-18

## 2025-04-14 RX ORDER — OXYCODONE HYDROCHLORIDE 30 MG/1
1 TABLET ORAL
Qty: 0 | Refills: 0 | DISCHARGE
Start: 2025-04-14

## 2025-04-14 RX ORDER — OXYCODONE HYDROCHLORIDE 30 MG/1
2.5 TABLET ORAL EVERY 4 HOURS
Refills: 0 | Status: DISCONTINUED | OUTPATIENT
Start: 2025-04-14 | End: 2025-04-18

## 2025-04-14 RX ORDER — HEPARIN SODIUM 1000 [USP'U]/ML
5000 INJECTION INTRAVENOUS; SUBCUTANEOUS
Qty: 0 | Refills: 0 | DISCHARGE
Start: 2025-04-14

## 2025-04-14 RX ADMIN — POLYETHYLENE GLYCOL 3350 17 GRAM(S): 17 POWDER, FOR SOLUTION ORAL at 05:14

## 2025-04-14 RX ADMIN — Medication 975 MILLIGRAM(S): at 12:52

## 2025-04-14 RX ADMIN — Medication 40 MILLIGRAM(S): at 05:15

## 2025-04-14 RX ADMIN — BUDESONIDE 0.5 MILLIGRAM(S): 0.25 SUSPENSION RESPIRATORY (INHALATION) at 21:25

## 2025-04-14 RX ADMIN — Medication 975 MILLIGRAM(S): at 22:07

## 2025-04-14 RX ADMIN — HEPARIN SODIUM 5000 UNIT(S): 1000 INJECTION INTRAVENOUS; SUBCUTANEOUS at 21:07

## 2025-04-14 RX ADMIN — Medication 975 MILLIGRAM(S): at 05:14

## 2025-04-14 RX ADMIN — Medication 975 MILLIGRAM(S): at 07:10

## 2025-04-14 RX ADMIN — OXYCODONE HYDROCHLORIDE 2.5 MILLIGRAM(S): 30 TABLET ORAL at 18:40

## 2025-04-14 RX ADMIN — Medication 2 TABLET(S): at 21:07

## 2025-04-14 RX ADMIN — OXYCODONE HYDROCHLORIDE 2.5 MILLIGRAM(S): 30 TABLET ORAL at 17:52

## 2025-04-14 RX ADMIN — BUDESONIDE 0.5 MILLIGRAM(S): 90 AEROSOL, POWDER RESPIRATORY (INHALATION) at 09:26

## 2025-04-14 RX ADMIN — Medication 975 MILLIGRAM(S): at 21:07

## 2025-04-14 NOTE — DISCHARGE NOTE NURSING/CASE MANAGEMENT/SOCIAL WORK - PATIENT PORTAL LINK FT
You can access the FollowMyHealth Patient Portal offered by [facility] by registering at the following website: [email or web address]. By joining [facility]’s FollowMyHealth portal, you will also be able to view your health information using other applications (apps) compatible with our system.

## 2025-04-14 NOTE — H&P ADULT - NSHPLABSRESULTS_GEN_ALL_CORE
LABS:                        10.9   8.85  )-----------( 221      ( 14 Apr 2025 09:12 )             31.7     04-14    134[L]  |  102  |  20  ----------------------------<  165[H]  3.8   |  27  |  1.27    Ca    8.0[L]      14 Apr 2025 09:12    < from: Xray Hip w/ Pelvis 2 or 3 Views, Left (04.10.25 @ 12:28) >    IMPRESSION:  There is a neck fracture of the left femur with displacement. No acute   chest finding. The femoral and acetabular components are in anatomic alignment. There is no fracture. The visualized pelvis is within normal limits.    < from: Xray Chest 1 View- PORTABLE-Urgent (Xray Chest 1 View- PORTABLE-Urgent .) (04.10.25 @ 12:29) >    AP chest on April 10, 2025 at 12:07 PM.    Heart normal for projection.    Lungs are clear. No fracture or change from October 6, 2024.

## 2025-04-14 NOTE — DISCHARGE NOTE PROVIDER - CARE PROVIDER_API CALL
Wolfgang Jacome.  Internal Medicine  [address] [place] [address]  Phone: [phone]  Fax: [phone]  Follow Up Time:     Piotr Casanova  Orthopaedic Surgery  [id number]  [place] [address]  Phone: [phone]  Fax: [phone]  Follow Up Time:

## 2025-04-14 NOTE — PROGRESS NOTE ADULT - NS ATTEND AMEND GEN_ALL_CORE FT
Patient doing well   Wife at bedside  Mild oozing from incision - will change dressing to michael vac for next 7 days and change lovenox to hep  Patient able to ambulate to door  Vitals and labs appropriate  Post op xrays reviewed  Patient at NV baseline  Awaiting carbon fiber AFO for chronic foot drop LLE  PT eval  Dispo likely to [facility]    DLS

## 2025-04-14 NOTE — DISCHARGE NOTE NURSING/CASE MANAGEMENT/SOCIAL WORK - NSDCPEFALRISK_GEN_ALL_CORE
For information on Fall & Injury Prevention, visit: [email or web address] OR  [email or web address] OR  https://www.cdc.gov/steadi/patient.html

## 2025-04-14 NOTE — DISCHARGE NOTE PROVIDER - DISCHARGE DIET
DASH Diet
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

## 2025-04-14 NOTE — PROGRESS NOTE ADULT - SUBJECTIVE AND OBJECTIVE BOX
INTERVAL HPI/OVERNIGHT EVENTS:  POD #3 L hemiarthroplasty/ fem neck fx  Pt. seen and examined at bedside resting comfortably.  Denies fever/chills, chest pain, dyspnea, cough, dizziness.   Pain well controlled    Vital Signs Last 24 Hrs  T(C): 36.7 (14 Apr 2025 05:11), Max: 37 (13 Apr 2025 15:00)  T(F): 98.1 (14 Apr 2025 05:11), Max: 98.6 (13 Apr 2025 15:00)  HR: 73 (14 Apr 2025 05:11) (73 - 90)  BP: 111/53 (14 Apr 2025 05:11) (111/53 - 125/54)  BP(mean): 78 (13 Apr 2025 19:52) (78 - 78)  RR: 17 (14 Apr 2025 05:11) (17 - 19)  SpO2: 92% (14 Apr 2025 05:11) (92% - 94%)    Parameters below as of 13 Apr 2025 19:52  Patient On (Oxygen Delivery Method): room air        PHYSICAL EXAM:    GENERAL: NAD  HEAD:  Atraumatic, Normocephalic  CHEST/LUNG: Equal and bilateral chest rise and fall  HEART: Not tachycardic  ABDOMEN: non distended, no guarding  LEFT HIP: supple aquacell intact moderate drainage noted no obvious signs of hematoma,   posterior ecchymosis/ bruising  LLE: + DP sensory intact + foot drop gastroc contracture -EHL - AROM dorsiflexion     I&O's Detail    13 Apr 2025 07:01  -  14 Apr 2025 07:00  --------------------------------------------------------  IN:  Total IN: 0 mL    OUT:    Voided (mL): 1150 mL  Total OUT: 1150 mL    Total NET: -1150 mL          LABS:                        10.9   9.74  )-----------( 195      ( 13 Apr 2025 05:54 )             31.5     04-13    134[L]  |  101  |  24[H]  ----------------------------<  134[H]  3.5   |  27  |  1.14    Ca    7.8[L]      13 Apr 2025 05:54        Urinalysis Basic - ( 13 Apr 2025 05:54 )    Color: x / Appearance: x / SG: x / pH: x  Gluc: 134 mg/dL / Ketone: x  / Bili: x / Urobili: x   Blood: x / Protein: x / Nitrite: x   Leuk Esterase: x / RBC: x / WBC x   Sq Epi: x / Non Sq Epi: x / Bacteria: x        type    RADIOLOGY & ADDITIONAL STUDIES:    Impression: 79y Male POD #3 L hemiarthroplasty/ fem neck fx/ foot drop    Plan:  - PT/OT WBAT  - Fitted for brace with vijay brady on 4/12 awaiting brace placement for l foot  - DVT Prophylaxis  - Pain regimen  - IS  - Awaiting d/c to [facility]  - Continue current management INTERVAL HPI/OVERNIGHT EVENTS:  POD #3 L hemiarthroplasty/ fem neck fx  Pt. seen and examined at bedside resting comfortably.  Denies fever/chills, chest pain, dyspnea, cough, dizziness.   Pain well controlled    Vital Signs Last 24 Hrs  T(C): 36.7 (14 Apr 2025 05:11), Max: 37 (13 Apr 2025 15:00)  T(F): 98.1 (14 Apr 2025 05:11), Max: 98.6 (13 Apr 2025 15:00)  HR: 73 (14 Apr 2025 05:11) (73 - 90)  BP: 111/53 (14 Apr 2025 05:11) (111/53 - 125/54)  BP(mean): 78 (13 Apr 2025 19:52) (78 - 78)  RR: 17 (14 Apr 2025 05:11) (17 - 19)  SpO2: 92% (14 Apr 2025 05:11) (92% - 94%)    Parameters below as of 13 Apr 2025 19:52  Patient On (Oxygen Delivery Method): room air        PHYSICAL EXAM:    GENERAL: NAD  HEAD:  Atraumatic, Normocephalic  CHEST/LUNG: Equal and bilateral chest rise and fall  HEART: Not tachycardic  ABDOMEN: non distended, no guarding  LEFT HIP: supple aquacell intact moderate drainage noted no obvious signs of hematoma,   posterior ecchymosis/ bruising  LLE: + DP sensory intact + foot drop gastroc contracture -EHL - AROM dorsiflexion     I&O's Detail    13 Apr 2025 07:01  -  14 Apr 2025 07:00  --------------------------------------------------------  IN:  Total IN: 0 mL    OUT:    Voided (mL): 1150 mL  Total OUT: 1150 mL    Total NET: -1150 mL          LABS:                        10.9   9.74  )-----------( 195      ( 13 Apr 2025 05:54 )             31.5     04-13    134[L]  |  101  |  24[H]  ----------------------------<  134[H]  3.5   |  27  |  1.14    Ca    7.8[L]      13 Apr 2025 05:54        Urinalysis Basic - ( 13 Apr 2025 05:54 )    Color: x / Appearance: x / SG: x / pH: x  Gluc: 134 mg/dL / Ketone: x  / Bili: x / Urobili: x   Blood: x / Protein: x / Nitrite: x   Leuk Esterase: x / RBC: x / WBC x   Sq Epi: x / Non Sq Epi: x / Bacteria: x        type    RADIOLOGY & ADDITIONAL STUDIES:    Impression: 79y Male POD #3 L hemiarthroplasty/ fem neck fx/ foot drop    Plan:  - PT/OT WBAT  - Fitted for brace with vijay brady on 4/12 awaiting brace placement for l foot  - DVT Prophylaxis, recommend Subq Heparin  - Pain regimen  - IS  - Awaiting d/c to [facility]  - Continue current management

## 2025-04-14 NOTE — PATIENT PROFILE ADULT - FUNCTIONAL ASSESSMENT - BASIC MOBILITY 6.
1-calculated by average/Not able to assess (calculate score using [facility] averaging method)

## 2025-04-14 NOTE — PROGRESS NOTE ADULT - SUBJECTIVE AND OBJECTIVE BOX
Patient is a 79y old  Male who presents with a chief complaint of fall (12 Apr 2025 09:03)    SUBJECTIVE / OVERNIGHT EVENTS: patient seen and examined. Patient reports constipation. Otherwise feels well    MEDICATIONS  (STANDING):  acetaminophen     Tablet .. 975 milliGRAM(s) Oral every 8 hours  buDESOnide    Inhalation Suspension 0.5 milliGRAM(s) Inhalation two times a day  enoxaparin Injectable 40 milliGRAM(s) SubCutaneous every 24 hours  pantoprazole    Tablet 40 milliGRAM(s) Oral before breakfast  polyethylene glycol 3350 17 Gram(s) Oral two times a day  senna 2 Tablet(s) Oral at bedtime  valsartan 40 milliGRAM(s) Oral daily    MEDICATIONS  (PRN):  albuterol    90 MICROgram(s) HFA Inhaler 2 Puff(s) Inhalation every 6 hours PRN Shortness of Breath  ondansetron Injectable 4 milliGRAM(s) IV Push every 8 hours PRN Nausea and/or Vomiting  oxyCODONE    IR 5 milliGRAM(s) Oral every 4 hours PRN Severe Pain (7 - 10)  oxyCODONE    IR 2.5 milliGRAM(s) Oral every 4 hours PRN Moderate Pain (4 - 6)    Vital Signs Last 24 Hrs  T(C): 37.4 (14 Apr 2025 11:16), Max: 37.4 (14 Apr 2025 11:16)  T(F): 99.3 (14 Apr 2025 11:16), Max: 99.3 (14 Apr 2025 11:16)  HR: 94 (14 Apr 2025 11:16) (73 - 94)  BP: 125/62 (14 Apr 2025 11:16) (111/53 - 125/62)  BP(mean): 78 (13 Apr 2025 19:52) (78 - 78)  RR: 18 (14 Apr 2025 11:16) (17 - 19)  SpO2: 94% (14 Apr 2025 11:16) (92% - 94%)    Parameters below as of 14 Apr 2025 11:16  Patient On (Oxygen Delivery Method): room air      PHYSICAL EXAM:  GENERAL: NAD, well-developed  HEAD:  Atraumatic, Normocephalic  EYES: EOMI, PERRLA, conjunctiva and sclera clear  NECK: Supple, No JVD  CHEST/LUNG: Clear to auscultation bilaterally; No wheeze  HEART: Regular rate and rhythm; No murmurs, rubs, or gallops  ABDOMEN: Soft, Nontender, Nondistended; Bowel sounds present  EXTREMITIES:  left hip dressing c/d/i  PSYCH: AAOx3  NEUROLOGY: non-focal  SKIN: No rashes or lesions    LABS:                                   10.9   8.85  )-----------( 221      ( 14 Apr 2025 09:12 )             31.7           Urinalysis Basic - ( 12 Apr 2025 05:44 )    Color: x / Appearance: x / SG: x / pH: x  Gluc: 149 mg/dL / Ketone: x  / Bili: x / Urobili: x   Blood: x / Protein: x / Nitrite: x   Leuk Esterase: x / RBC: x / WBC x   Sq Epi: x / Non Sq Epi: x / Bacteria: x        RADIOLOGY & ADDITIONAL TESTS:    Imaging Personally Reviewed:    Consultant(s) Notes Reviewed:      Care Discussed with Consultants/Other Providers: ortho    Assessment and Plan:

## 2025-04-14 NOTE — CONSULT NOTE ADULT - TIME BILLING
initial consult-- face to face time encounter and counseling the patient, care coordination, reviewing chart and data-including but not limited to labs and imaging, discussion with rehab team, patient's family at bedside

## 2025-04-14 NOTE — DISCHARGE NOTE PROVIDER - CARE PROVIDERS DIRECT ADDRESSES
,[email or web address]

## 2025-04-14 NOTE — PROGRESS NOTE ADULT - NS ATTEND BILL GEN_ALL_CORE
Pt presents to triage sent by surgeon for c/o severe right hip pain and possible dislocation. Pt had a fall 2 weeks ago and is scheduled for hip replacement on April 9th.   
Attending to bill

## 2025-04-14 NOTE — DISCHARGE NOTE NURSING/CASE MANAGEMENT/SOCIAL WORK - FINANCIAL ASSISTANCE
[facility] provides services at a reduced cost to those who are determined to be eligible through [facility]’s financial assistance program. Information regarding [facility]’s financial assistance program can be found by going to [email or web address] or by calling [phone].

## 2025-04-14 NOTE — H&P ADULT - ATTENDING COMMENTS
I have personally seen and examined the patient.  I fully participated in the care of this patient.  I have made amendments to the documentation where necessary, and agree with the history, physical exam, and plan as documented by the Resident.        78 y/o Community[place]dwelling   Acute care admission post fall at home with  4/10 Dxed  Left femoral Neck fracture- s/p Left hemiarthroplasty on 4/11.Acute rehab admission 4/14    Living with family  Independent with ADLs, has DMEs which he is sparingly using  Hx of cognitive deficits and multiple falls    Alert, and awake  Chest--mod diffuse wheeze  Abd soft   Ext no edema  B/l ant knee scare  Left foot drop  limited Left knee ROM    Antigravity all ext except Left DF 2/5    RECENT LABS/IMAGING                        9.9    8.32  )-----------( 241      ( 15 Apr 2025 06:27 )             29.8     04-15    137  |  101  |  17  ----------------------------<  121[H]  4.0   |  30  |  1.11    Ca    8.1[L]      15 Apr 2025 06:27    TPro  5.6[L]  /  Alb  2.3[L]  /  TBili  0.8  /  DBili  x   /  AST  26  /  ALT  19  /  AlkPhos  85  04-15      Urinalysis Basic - ( 15 Apr 2025 06:27 )    Color: x / Appearance: x / SG: x / pH: x  Gluc: 121 mg/dL / Ketone: x  / Bili: x / Urobili: x   Blood: x / Protein: x / Nitrite: x   Leuk Esterase: x / RBC: x / WBC x   Sq Epi: x / Non Sq Epi: x / Bacteria: x      Dx Left femoral neck fracture s/p fall s/p Left hip hemiarthroplasty  Commence therapy   Post hip precautions  labs unremarkable   Left foot drop--will clarify if patient has a brace, if not, will get orthotics referral  Asthma/Asbestosis exposure: Mod wheeze on exam--Continue resp treatments, will get CXR if non recently, f/u with hospitalist  Hx of cognitive deficits and Left knee pain despite previous replacement  Collateral hx obtained from daughter--hx of cognitive deficits, chronic left knee pain and reduced ROM despite previous replacement, patient declined f/u to address it

## 2025-04-14 NOTE — H&P ADULT - NSHPREVIEWOFSYSTEMS_GEN_ALL_CORE
REVIEW OF SYSTEMS  Constitutional: No fever, No Chills, No fatigue  HEENT: No visual disturbances, No difficulty hearing  Pulm: No cough,  No shortness of breath  Cardio: No chest pain, No palpitations  GI:  No abdominal pain, No nausea, No vomiting, No diarrhea, (+) constipation (last BM 4/14)  : No dysuria, No frequency, No hematuria  Neuro: No headaches, (+) loss of strength, No numbness  Skin: No itching, No rashes  MSK:  No muscle pain, No Neck pain, No back pain (+) left hip pain  Psych:  No depression, No anxiety

## 2025-04-14 NOTE — DISCHARGE NOTE PROVIDER - HOSPITAL COURSE
78yo male w/ hx of Asthma, HTN, presented to the ED accompanied by his wife s/p fall in the driveway while using a leaf blower to clean the driveway, noted on imaging to have Left femoral Neck fracture. Seen by ortho and underwent left hemiarthroplasty on 4/11. Patient also with chronic left foot drop. Seen by orthotist. Patient has h/o asbestosis exposure but no history of COPD. Currently doing well on RA. Patient discharged to acute rehab in stable condition.     #Fall  #Left Femoral neck fracture  -s/p L hip hemiarthroplasty 4/11  #HTN  #Asthma  #Asbestosis exposure

## 2025-04-14 NOTE — H&P ADULT - ASSESSMENT
ASSESSMENT/PLAN    79M with pMHx of Asthma, HTN, GERD, chronic left foot drop,  h/o asbestosis exposure but no history of COPD- presented to the  ED accompanied by his wife s/p fall in the driveway while using a leaf blower on 4/10. X-ray of hip/femur with Left femoral Neck fracture- s/p Left hemiarthroplasty on 4/11. Not on anticoagulation. Patient with Gait Instability, ADL impairments and Functional impairments.      #Gait Instability, ADL impairments and Functional impairments  - Start Comprehensive Rehab Program of PT/OT    #Chronic Left Foot Drop  -Ankle Foot Othosis, carbon fiber: Daily wear for Foot Drop when OOB    #Pain control  - Acetaminophen 975 mg q8 hours  -oxyCODONE 5mg PRN q4 hours As needed Severe Pain (7 - 10)    #Asthma/#Asbestosis exposure  -Advair Diskus 250 mcg-50 mcg 1 puff inhaled BID  -Albuterol CFC free 90 mcg/inh inhalation aerosol: 2 puff(s) inhaled every 4 hours, PRN For SOB  -Arformoterol 15 mcg/2 mL inhalation solution:   -Budesonide 0.5 mg/2 mL inhalation suspension: inhaled once a day    #HTN  -Valsartan 40mg daily    #GI/Bowel Mgmt/#GERD  - Continue Senna at bedtime   - Miralax   - Polyethylene glycol BID  - Protonix 40 mg     #Bladder management  - Continue to monitor PVR q 8 hours (SC if > 400) x once on admission   - Monitor UO    #DVT prophylaxis   - Lovenox  - TEDs     #Skin:  -***    FEN   - Diet - DASH Diet    Precautions / PROPHYLAXIS:   - Falls,  - Ortho: Weight bearing status: WBAT   - Lungs: Aspiration  - Pressure injury/Skin: OOB to Chair, PT/OT        MEDICAL PROGNOSIS: GOOD              REHAB POTENTIAL: GOOD              ESTIMATED DISPOSITION: HOME WITH HOME CARE              ELOS: 10-14 Days   EXPECTED THERAPY:     P.T. 2 hr/day       O.T. 1hr/day      S.L.P. 0 hr/day       P&O Unnecessary       EXP FREQUENCY: 5 days per 7 day period     PRESCREEN COMPARISON:   I have reviewed the prescreen information and I have found no relevant changes between the preadmission screening and my post admission evaluation     RATIONALE FOR INPATIENT ADMISSION - Patient demonstrates the following: (check all that apply)  [X] Medically appropriate for rehabilitation admission  [X] Has attainable rehab goals with an appropriate initial discharge plan  [X] Has rehabilitation potential (expected to make a significant improvement within a reasonable period of time)   [X] Requires close medical management by a rehab physician, rehab nursing care, Hospitalist and comprehensive interdisciplinary team (including PT, OT, & or SLP, Prosthetics and Orthotics)        ASSESSMENT/PLAN    79M with pMHx of Asthma, HTN, GERD, chronic left foot drop,  h/o asbestosis exposure but no history of COPD- presented to the  ED accompanied by his wife s/p fall in the driveway while using a leaf blower on 4/10. X-ray of hip/femur with Left femoral Neck fracture- s/p Left hemiarthroplasty on 4/11. Not on anticoagulation. Patient with Gait Instability, ADL impairments and Functional impairments.      #Gait Instability, ADL impairments and Functional impairments  - Start Comprehensive Rehab Program of PT/OT    #Chronic Left Foot Drop  -Ankle Foot Othosis, carbon fiber: Daily wear for Foot Drop when OOB    #Pain control  - Acetaminophen 975 mg q8 hours  -oxyCODONE 5mg PRN q4 hours As needed Severe Pain (7 - 10)    #Asthma/#Asbestosis exposure  -Advair Diskus 250 mcg-50 mcg 1 puff inhaled BID  -Albuterol CFC free 90 mcg/inh inhalation aerosol: 2 puff(s) inhaled every 4 hours, PRN For SOB  -Arformoterol 15 mcg/2 mL inhalation solution:   -Budesonide 0.5 mg/2 mL inhalation suspension: inhaled once a day    #HTN  -Valsartan 40mg daily    #GI/Bowel Mgmt/#GERD  - Continue Senna at bedtime   - Miralax   - Polyethylene glycol BID  - Protonix 40 mg     #Bladder management  - Continue to monitor PVR q 8 hours (SC if > 400) x once on admission   - Monitor UO    #DVT prophylaxis   - Lovenox  - TEDs     #Skin:  - left hip incision covered with Aquacel (saturated on admission, changed), left hip ecchymosis     FEN   - Diet - DASH Diet    Precautions / PROPHYLAXIS:   - Falls,  - Ortho: Weight bearing status: WBAT   - Lungs: Aspiration  - Pressure injury/Skin: OOB to Chair, PT/OT        MEDICAL PROGNOSIS: GOOD              REHAB POTENTIAL: GOOD              ESTIMATED DISPOSITION: HOME WITH HOME CARE              ELOS: 10-14 Days   EXPECTED THERAPY:     P.T. 2 hr/day       O.T. 1hr/day      S.L.P. 0 hr/day       P&O Unnecessary       EXP FREQUENCY: 5 days per 7 day period     PRESCREEN COMPARISON:   I have reviewed the prescreen information and I have found no relevant changes between the preadmission screening and my post admission evaluation     RATIONALE FOR INPATIENT ADMISSION - Patient demonstrates the following: (check all that apply)  [X] Medically appropriate for rehabilitation admission  [X] Has attainable rehab goals with an appropriate initial discharge plan  [X] Has rehabilitation potential (expected to make a significant improvement within a reasonable period of time)   [X] Requires close medical management by a rehab physician, rehab nursing care, Hospitalist and comprehensive interdisciplinary team (including PT, OT, & or SLP, Prosthetics and Orthotics)        ASSESSMENT/PLAN    79M with pMHx of Asthma, HTN, GERD, chronic left foot drop,  h/o asbestosis exposure but no history of COPD- presented to the  ED accompanied by his wife s/p fall in the driveway while using a leaf blower on 4/10. X-ray of hip/femur with Left femoral Neck fracture- s/p Left hemiarthroplasty on 4/11. Not on anticoagulation. Patient with Gait Instability, ADL impairments and Functional impairments.      #Gait Instability, ADL impairments and Functional impairments  - Start Comprehensive Rehab Program of PT/OT    #Chronic Left Foot Drop  -Ankle Foot Othosis, carbon fiber: Daily wear for Foot Drop when OOB    #Pain control  - Acetaminophen 975 mg q8 hours  -oxyCODONE 5mg PRN q4 hours As needed Severe Pain (7 - 10)    #Asthma/#Asbestosis exposure  -Advair Diskus 250 mcg-50 mcg 1 puff inhaled BID  -Albuterol CFC free 90 mcg/inh inhalation aerosol: 2 puff(s) inhaled every 4 hours, PRN For SOB  -Arformoterol 15 mcg/2 mL inhalation solution:   -Budesonide 0.5 mg/2 mL inhalation suspension: inhaled once a day    #HTN  -Valsartan 40mg daily    #GI/Bowel Mgmt/#GERD  - Continue Senna at bedtime   - Miralax   - Polyethylene glycol BID  - Protonix 40 mg     #Bladder management  - Continue to monitor PVR q 8 hours (SC if > 400) x once on admission   - Monitor UO    #DVT prophylaxis   - Heparin TID  - TEDs     #Skin:  - left hip incision covered with Aquacel (saturated on admission, changed), left hip ecchymosis     FEN   - Diet - DASH Diet    Precautions / PROPHYLAXIS:   - Falls,  - Ortho: Weight bearing status: WBAT   - Lungs: Aspiration  - Pressure injury/Skin: OOB to Chair, PT/OT        MEDICAL PROGNOSIS: GOOD              REHAB POTENTIAL: GOOD              ESTIMATED DISPOSITION: HOME WITH HOME CARE              ELOS: 10-14 Days   EXPECTED THERAPY:     P.T. 2 hr/day       O.T. 1hr/day      S.L.P. 0 hr/day       P&O Unnecessary       EXP FREQUENCY: 5 days per 7 day period     PRESCREEN COMPARISON:   I have reviewed the prescreen information and I have found no relevant changes between the preadmission screening and my post admission evaluation     RATIONALE FOR INPATIENT ADMISSION - Patient demonstrates the following: (check all that apply)  [X] Medically appropriate for rehabilitation admission  [X] Has attainable rehab goals with an appropriate initial discharge plan  [X] Has rehabilitation potential (expected to make a significant improvement within a reasonable period of time)   [X] Requires close medical management by a rehab physician, rehab nursing care, Hospitalist and comprehensive interdisciplinary team (including PT, OT, & or SLP, Prosthetics and Orthotics)        ASSESSMENT/PLAN    79M with pMHx of Asthma, HTN, GERD, chronic left foot drop,  h/o asbestosis exposure but no history of COPD- presented to the  ED accompanied by his wife s/p fall in the driveway while using a leaf blower on 4/10. X-ray of hip/femur with Left femoral Neck fracture- s/p Left hemiarthroplasty on 4/11. Not on anticoagulation. Patient with Gait Instability, ADL impairments and Functional impairments.      #Gait Instability, ADL impairments and Functional impairments  - Start Comprehensive Rehab Program of PT/OT    #Chronic Left Foot Drop  -Ankle Foot Othosis, carbon fiber: Daily wear for Foot Drop when OOB    #Pain control  - Acetaminophen 975 mg q8 hours  -oxyCODONE 5mg PRN q4 hours As needed Severe Pain (7 - 10)    #Asthma/#Asbestosis exposure  -Advair Diskus 250 mcg-50 mcg 1 puff inhaled BID  -Albuterol CFC free 90 mcg/inh inhalation aerosol: 2 puff(s) inhaled every 4 hours, PRN For SOB  -Arformoterol 15 mcg/2 mL inhalation solution:   -Budesonide 0.5 mg/2 mL inhalation suspension: inhaled once a day    #HTN  -Valsartan 40mg daily    #GI/Bowel Mgmt/#GERD  - Continue Senna at bedtime   - Miralax   - Polyethylene glycol BID  - Protonix 40 mg     #Bladder management  - Continue to monitor PVR q 8 hours (SC if > 400) x once on admission   - Monitor UO    #DVT prophylaxis   - Heparin TID  - TEDs     #Skin:  - left hip incision covered with Aquacel (saturated on admission) --> surgery to place pravena vac    FEN   - Diet - DASH Diet    Precautions / PROPHYLAXIS:   - Falls,  - Ortho: Weight bearing status: WBAT   - Lungs: Aspiration  - Pressure injury/Skin: OOB to Chair, PT/OT        MEDICAL PROGNOSIS: GOOD              REHAB POTENTIAL: GOOD              ESTIMATED DISPOSITION: HOME WITH HOME CARE              ELOS: 10-14 Days   EXPECTED THERAPY:     P.T. 2 hr/day       O.T. 1hr/day      S.L.P. 0 hr/day       P&O Unnecessary       EXP FREQUENCY: 5 days per 7 day period     PRESCREEN COMPARISON:   I have reviewed the prescreen information and I have found no relevant changes between the preadmission screening and my post admission evaluation     RATIONALE FOR INPATIENT ADMISSION - Patient demonstrates the following: (check all that apply)  [X] Medically appropriate for rehabilitation admission  [X] Has attainable rehab goals with an appropriate initial discharge plan  [X] Has rehabilitation potential (expected to make a significant improvement within a reasonable period of time)   [X] Requires close medical management by a rehab physician, rehab nursing care, Hospitalist and comprehensive interdisciplinary team (including PT, OT, & or SLP, Prosthetics and Orthotics)            79M with pMHx of Asthma, HTN, GERD, chronic left foot drop,  h/o asbestosis exposure but no history of COPD- presented to the  ED accompanied by his wife s/p fall in the driveway while using a leaf blower on 4/10. X-ray of hip/femur with Left femoral Neck fracture- s/p Left hemiarthroplasty on 4/11. Not on anticoagulation. Patient with Gait Instability, ADL impairments and Functional impairments.    * labs unremarkable   * Left foot drop--will clarify if patient has a brace, if not, will get orthotics referral  * Asthma/Asbestosis exposure: Mod wheeze on exam--Continue resp treatments, will get CXR if non recently, f/u with hospitalist  * Hx of cognitive deficits and Left knee pain despite previous replacement  Collateral hx obtained from daughter--hx of cognitive deficits, chronic left knee pain and reduced ROM despite previous replacement, patient declined f/u to address it  Post hip precautions  DVT ppx heparin     #  Left femoral Neck fracture- s/p Left hemiarthroplasty on 4/11.  Gait Instability, ADL impairments and Functional impairments  - Commence Comprehensive Rehab Program of PT/OT    #Chronic Left Foot Drop  -Ankle Foot Othosis, carbon fiber: Daily wear for Foot Drop when OOB    #Pain control  - Acetaminophen 975 mg q8 hours  -oxyCODONE 5mg PRN q4 hours As needed Severe Pain (7 - 10)    #Asthma/#Asbestosis exposure  -Advair Diskus 250 mcg-50 mcg 1 puff inhaled BID  -Albuterol CFC free 90 mcg/inh inhalation aerosol: 2 puff(s) inhaled every 4 hours, PRN For SOB  -Arformoterol 15 mcg/2 mL inhalation solution:   -Budesonide 0.5 mg/2 mL inhalation suspension: inhaled once a day    #HTN  -Valsartan 40mg daily    #GI/Bowel Mgmt/#GERD  - Continue Senna at bedtime   - Miralax   - Polyethylene glycol BID  - Protonix 40 mg     #Bladder management  - Continue to monitor PVR q 8 hours (SC if > 400) x once on admission   - Monitor UO    #DVT prophylaxis   - Heparin TID  - TEDs     #Skin:  - left hip incision covered with Aquacel (saturated on admission) --> surgery to place pravena vac    FEN   - Diet - DASH Diet    Precautions / PROPHYLAXIS:   - Falls,  - Ortho: Weight bearing status: WBAT   - Lungs: Aspiration  - Pressure injury/Skin: OOB to Chair, PT/OT      liaison with family/providers  4/15--Collateral hx obtained from daughter--hx of cognitive deficits, chronic left knee pain and reduced ROM despite previous replacement, patient declined f/u to address it    MEDICAL PROGNOSIS: GOOD              REHAB POTENTIAL: GOOD              ESTIMATED DISPOSITION: HOME WITH HOME CARE              ELOS: 10-14 Days   EXPECTED THERAPY:     P.T. 2 hr/day       O.T. 1hr/day      S.L.P. 0 hr/day       P&O Unnecessary       EXP FREQUENCY: 5 days per 7 day period     PRESCREEN COMPARISON:   I have reviewed the prescreen information and I have found no relevant changes between the preadmission screening and my post admission evaluation     RATIONALE FOR INPATIENT ADMISSION - Patient demonstrates the following: (check all that apply)  [X] Medically appropriate for rehabilitation admission  [X] Has attainable rehab goals with an appropriate initial discharge plan  [X] Has rehabilitation potential (expected to make a significant improvement within a reasonable period of time)   [X] Requires close medical management by a rehab physician, rehab nursing care, Hospitalist and comprehensive interdisciplinary team (including PT, OT, & or SLP, Prosthetics and Orthotics)

## 2025-04-14 NOTE — PROGRESS NOTE ADULT - ASSESSMENT
78yo male w/ hx of Asthma, HTN, presented to the ED accompanied by his wife s/p fall in the driveway while using a leaf blower to clean the driveway, noted on imaging to have Left femoral Neck fracture    #Fall  #Left Femoral neck fracture  -s/p L hip hemiarthroplasty 4/11  -Pain control - tylenol ATC and oxyIR PRN  -management per ortho; WBAT  -PT/OT/PMR eval  -chronic foot drop - per ortho- carbon fiber AFO. Seen by Fab Holder and measurement taken    #HTN  -Valsartan    #Asthma  #Asbestosis exposure  -Stable on RA  -CXR results noted; Clear lungs  -Continue Inhalers    #VTE ppx  -HSQ per ortho    Plan discussed with wife at bedside.   Hope to DC to Acute rehab in AM

## 2025-04-14 NOTE — CHART NOTE - NSCHARTNOTEFT_GEN_A_CORE
s/P Left hip hemiarthroplasty POD # 3  This am , patient found to have sero-sanguanous drainage from incision.     Discussed with Dr Casanova   Left Hip dressing removed, Aquacel dressing replaced with Prevena vac.    Plan:   D/c Lovenox , resume heparin SQ           Dressing changed and Prevena Vac applied            Discussed with patient and wife.            WBAT left leg            Abduction pillow while in bed            Posterior Total hip precautions s/P Left hip hemiarthroplasty POD # 3  This am , patient found to have sero-sanguanous drainage from incision.     Discussed with Dr Casanova   Left Hip dressing removed, Aquacel dressing replaced with Prevena vac.    Plan:   D/c Lovenox , resume heparin SQ           Dressing changed and Prevena Vac applied            Discussed with patient and wife.            WBAT left leg            Abduction pillow while in bed            Posterior hip precautions  Continue DVT ppx for 6 weeks  Hold on lovenox until michael dressing comes down on 4.20.25      DLS

## 2025-04-14 NOTE — PATIENT PROFILE ADULT - FUNCTIONAL ASSESSMENT - DAILY ACTIVITY 5.
Assessment & Plan     Acute suppurative otitis media of left ear without spontaneous rupture of tympanic membrane, recurrence not specified  - penicillin V (VEETID) 500 MG tablet  Dispense: 14 tablet; Refill: 0     Patient Instructions   Penicillin twice a day for 7 days    Push fluids  Lots of handwashing.   Ibuprofen as needed for fever or pain  Delsymor dayquil/nyquil for cough as needed     Rest as able.   F/u in the clinic if symptoms persist or worsen.          No follow-ups on file.    CARLOS Bullard CNP  [facility] is a 43 year old female who presents to clinic today for the following health issues:  Chief Complaint   Patient presents with    Ear Problem     Left ear pain x 2 weeks      HPI    URI Adult    Onset of symptoms was 2 week(s) ago.  Course of illness is worsening.    Severity moderate  Current and Associated symptoms: ear pain left  Treatment measures tried include Tylenol/Ibuprofen, Fluids, and Rest.  Predisposing factors include None.      Review of Systems  Constitutional, HEENT, cardiovascular, pulmonary, GI, , musculoskeletal, neuro, skin, endocrine and psych systems are negative, except as otherwise noted.      Objective    /78 (BP Location: Right arm, Patient Position: Chair, Cuff Size: Adult Large)   Pulse 70   Temp 98.2  F (36.8  C) (Oral)   Resp 14   Wt (!) 139.7 kg (308 lb)   LMP 03/05/2025 (Approximate)   SpO2 98%   BMI 45.48 kg/m    Physical Exam   GENERAL: alert and no distress  EYES: Eyes grossly normal to inspection, PERRL and conjunctivae and sclerae normal  HENT: normal cephalic/atraumatic, right ear: normal: no effusions, no erythema, normal landmarks, left ear: erythematous, bulging membrane, and mucopurulent effusion, nose and mouth without ulcers or lesions, oropharynx clear, and oral mucous membranes moist  NECK: no adenopathy, no asymmetry, masses, or scars  RESP: lungs clear to auscultation - no  rales, rhonchi or wheezes  CV: regular rate and rhythm, normal S1 S2, no S3 or S4, no murmur, click or rub, no peripheral edema         3 = A little assistance

## 2025-04-14 NOTE — PATIENT PROFILE ADULT - FALL HARM RISK - HARM RISK INTERVENTIONS
Assistance with ambulation/Assistance OOB with selected safe patient handling equipment/Communicate Risk of Fall with Harm to all staff/Discuss with provider need for PT consult/Monitor gait and stability/Reinforce activity limits and safety measures with patient and family/Sit up slowly, dangle for a short time, stand at bedside before walking/Tailored Fall Risk Interventions/Use of alarms - bed, chair and/or voice tab/Visual Cue: Yellow wristband and red socks/Bed in lowest position, wheels locked, appropriate side rails in place/Call bell, personal items and telephone in reach/Instruct patient to call for assistance before getting out of bed or chair/Non-slip footwear when patient is out of bed/[place] to call system/Physically safe environment - no spills, clutter or unnecessary equipment/Purposeful Proactive Rounding/Room/bathroom lighting operational, light cord in reach

## 2025-04-14 NOTE — PATIENT PROFILE ADULT - FALL HARM RISK - TYPE OF ASSESSMENT
Infant nursing well. She states that she was supplementing due to juandice levels being high. She plans on going home and exclusively breastfeeding. She does have a pump at home. Engorgement management discussed. Warm line and group info given. Pt will successfully establish breastfeeding by feeding in response to early feeding cues   or wake every 3h, will obtain deep latch, and will keep log of feedings/output. Taught to BF at hunger cues and or q 2-3 hrs and to offer 10-20 drops of hand expressed colostrum at any non-feeds. Breast Assessment  Left Breast: Large  Left Nipple: Everted, Intact  Right Breast: Large  Right Nipple: Everted, Intact  Breast- Feeding Assessment  Attends Breast-Feeding Classes: No  Breast-Feeding Experience: Yes  Breast Trauma/Surgery: Yes  Type/Quality: Good  Lactation Consultant Visits  Breast-Feedings:  (did not observe at this time)  Mother/Infant Observation  Mother Observation: Breast comfortable, Alignment, Holds breast, Close hold, Lets baby end feeding, Nipple round on release, Recognizes feeding cues  Infant Observation: Lips flanged, upper, Lips flanged, lower, Breast tissue moves, Audible swallows, Feeding cues, Latches nipple and aereolae, Opens mouth (Prominent maxiallary frenum with low insertion point; Diastema noted.)  LATCH Documentation  Latch: Grasps breast, tongue down, lips flanged, rhythmic sucking  Audible Swallowing: Spontaneous and intermittent (24 hours old)  Type of Nipple: Everted (after stimulation)  Comfort (Breast/Nipple): Soft/non-tender  Hold (Positioning): No assist from staff, mother able to position/hold infant  LATCH Score: 10    Pumping:  Guidelines for pumping, milk collection and storage, proper cleaning of pump parts all reviewed. How to establish and maintain breast milk supply through pumping reviewed. Differences between hospital grade rental pumps vs store bought double electric/hand pumps discussed.   Set up pumping with double electric set up. Assisted with pump session. List of area pump rental locations and lactation support services provided. Engorgement Care Guidelines:  Reviewed how milk is made and normal phases of milk production. Taught care of engorged breasts - frequent breastfeeding encouraged, cool packs and motrin as tolerated. Anticipatory guidance shared. Chart shows numerous feedings, void, stool WNL. Discussed importance of monitoring outputs and feedings on first week of life. Discussed ways to tell if baby is  getting enough breast milk, ie  voids and stools, change in color of stool, and return to birth wt within 2 weeks. Follow up with pediatrician visit for weight check in 1-2 days (per AAP guidelines.)  Encouraged to call Warm Line  [phone]  for any questions/problems that arise.  Mother also given breastfeeding support group dates and times for any future needs Admission

## 2025-04-14 NOTE — DISCHARGE NOTE PROVIDER - NSDCCPCAREPLAN_GEN_ALL_CORE_FT
PRINCIPAL DISCHARGE DIAGNOSIS  Diagnosis: Fx femoral neck  Assessment and Plan of Treatment: s/p left hemiarthroplasty. You are being discharged to acute rehab for physical therapy      SECONDARY DISCHARGE DIAGNOSES  Diagnosis: HTN (hypertension)  Assessment and Plan of Treatment: continue with home blood pressure medication

## 2025-04-14 NOTE — DISCHARGE NOTE PROVIDER - NSDCMRMEDTOKEN_GEN_ALL_CORE_FT
acetaminophen 325 mg oral tablet: 3 tab(s) orally every 8 hours  Advair Diskus 250 mcg-50 mcg inhalation powder: 1 puff(s) inhaled 2 times a day  albuterol CFC free 90 mcg/inh inhalation aerosol: 2 puff(s) inhaled every 4 hours, As needed, Shortness of Breath  Ankle Foot Othosis, carbon fiber: Daily wear for Foot Drop  arformoterol 15 mcg/2 mL inhalation solution:   budesonide 0.5 mg/2 mL inhalation suspension: inhaled once a day  enoxaparin: 40 milligram(s) subcutaneous once a day DVT ppx  oxyCODONE 5 mg oral tablet: 1 tab(s) orally every 4 hours As needed Severe Pain (7 - 10)  polyethylene glycol 3350 oral powder for reconstitution: 17 gram(s) orally 2 times a day  Protonix 40 mg oral delayed release tablet: 1 tab(s) orally once a day  senna leaf extract oral tablet: 2 tab(s) orally once a day (at bedtime)  valsartan 40 mg oral tablet: 1 tab(s) orally once a day   acetaminophen 325 mg oral tablet: 3 tab(s) orally every 8 hours  Advair Diskus 250 mcg-50 mcg inhalation powder: 1 puff(s) inhaled 2 times a day  albuterol CFC free 90 mcg/inh inhalation aerosol: 2 puff(s) inhaled every 4 hours, As needed, Shortness of Breath  Ankle Foot Othosis, carbon fiber: Daily wear for Foot Drop  arformoterol 15 mcg/2 mL inhalation solution:   budesonide 0.5 mg/2 mL inhalation suspension: inhaled once a day  heparin: 5,000 unit(s) subcutaneous 3 times a day dvt ppx  oxyCODONE 5 mg oral tablet: 1 tab(s) orally every 4 hours As needed Severe Pain (7 - 10)  polyethylene glycol 3350 oral powder for reconstitution: 17 gram(s) orally 2 times a day  Protonix 40 mg oral delayed release tablet: 1 tab(s) orally once a day  senna leaf extract oral tablet: 2 tab(s) orally once a day (at bedtime)  valsartan 40 mg oral tablet: 1 tab(s) orally once a day

## 2025-04-14 NOTE — H&P ADULT - NSHPSOCIALHISTORY_GEN_ALL_CORE
SOCIAL HISTORY  Smoking - Denied  EtOH - Denied   Drugs - Denied    FUNCTIONAL HISTORY  Lives with wife, daughter and grandson in  with 3STE with HR. 2 level home with bed/bath on second level. Tub shower with no DME. Reporting independence with ADLs, IADLs and ambulation without device prior to admission. Owns RW.  Prior Level of Function: Independent in ADLs and ambulation.     CURRENT FUNCTIONAL STATUS  - Bed Mobility: Moderate assist  - Transfers: Moderate assist;  WBAT LLE with RW  - Gait: Moderate assist;   WBAT LLE with RW  - ADLs: Moderate assist;    WBAT LLE with RW

## 2025-04-14 NOTE — H&P ADULT - HISTORY OF PRESENT ILLNESS
79M with pMHx of Asthma, HTN, GERD, chronic left foot drop,  h/o asbestosis exposure but no history of COPD, Right TKR, & Left TKR on 2014 (Dr. Pimentel). Presented to the  ED accompanied by his wife s/p fall in the driveway while using a leaf blower on 4/10. Denies LOC or syncopal episode. X-ray of hip/femur with Left femoral Neck fracture- s/p Left hemiarthroplasty on 4/11. Not on anticoagulation. Patient was evaluated by PM&R and therapy for functional deficits, gait/ADL impairments and acute rehabilitation was recommended. Patient was cleared for discharge to Health system IRU on 4/14/25.        79M with PMHx of Asthma, HTN, GERD, chronic left foot drop,  h/o asbestosis exposure but no history of COPD, Right TKR, & Left TKR on 2014 (Dr. Pimentel). Presented to the  ED accompanied by his wife s/p fall in the driveway while using a leaf blower on 4/10. Denies LOC or syncopal episode. X-ray of hip/femur with Left femoral Neck fracture- s/p Left hemiarthroplasty on 4/11. Not on anticoagulation. Patient was evaluated by PM&R and therapy for functional deficits, gait/ADL impairments and acute rehabilitation was recommended. Patient was cleared for discharge to [facility] IRU on 4/14/25.

## 2025-04-14 NOTE — H&P ADULT - NSICDXPASTMEDICALHX_GEN_ALL_CORE_FT
PAST MEDICAL HISTORY:  Asthma     Environmental lung disease     GERD (gastroesophageal reflux disease)     HTN (hypertension)

## 2025-04-14 NOTE — H&P ADULT - NSHPPHYSICALEXAM_GEN_ALL_CORE
Gen - NAD, Comfortable  HEENT - NCAT, EOMI  Neck - Supple  Pulm - CTAB, No wheeze, No rhonchi, No crackles  Cardiovascular - RRR, S1S2  Abdomen - Soft, NT/ND  Extremities - No C/C/E, No calf tenderness  Neuro-     Cognitive - AAOx3, follows commands     Motor -                     LEFT    UE - ShAB 5/5, EF 5/5, EE 5/5, WE 5/5,  5/5                    RIGHT UE - ShAB 5/5, EF 5/5, EE 5/5, WE 5/5,  5/5                    LEFT    LE - HF 1/5, KE 4/5, DF 1/5, PF 4/5                    RIGHT LE - HF 4/5, KE 5/5, DF 5/5, PF 5/5        Sensory - Intact to LT  Psychiatric - Mood stable, Affect WNL  Skin: left hip incision covered with Aquacel (saturated on admission, changed), left hip ecchymosis Gen - NAD, Comfortable  HEENT - NCAT, EOMI  Neck - Supple  Pulm - CTAB, No wheeze, No rhonchi, No crackles  Cardiovascular - RRR, S1S2  Abdomen - Soft, NT/ND  Extremities - No C/C/E, No calf tenderness  Neuro-     Cognitive - AAOx3, follows commands     Motor -                     LEFT    UE - ShAB 5/5, EF 5/5, EE 5/5, WE 5/5,  5/5                    RIGHT UE - ShAB 5/5, EF 5/5, EE 5/5, WE 5/5,  5/5                    LEFT    LE - HF 1/5, KE 4/5, DF 1/5, PF 4/5                    RIGHT LE - HF 4/5, KE 5/5, DF 5/5, PF 5/5        Sensory - Intact to LT  Psychiatric - Mood stable, Affect WNL  Skin: left hip incision covered with Aquacel (saturated on admission,), left hip ecchymosis

## 2025-04-14 NOTE — CONSULT NOTE ADULT - SUBJECTIVE AND OBJECTIVE BOX
79yM was admitted on 04-10    CC: Patient is a 79y old  Male who presents with a chief complaint of fall (14 Apr 2025 11:48)    HPI:  80yo male w/ hx of Asthma, HTN, presented to the ED accompanied by his wife s/p fall in the driveway while using a leaf blower to clean the driveway. Pt not sure how he lost his balance but landed on his left side w/o LOC. Pt's wife was present and states herself she is not sure how he fell as well. But no LOC or syncopal episode. Pt was unable to move nor was wife able to help him up due to pain in her left leg. Pt denies prior to falling any episode of cp, palpitations, sob, abd pain, N/V, fever, chills. He was found to have a left hip fracture and is s/p left hemiarthroplasty. PM&R consulted for rehab recommendations.       Imaging performed:  Left Femur X ray 4/10-There is a neck fracture of the left femur with displacement. No acute chest finding.  Left Hip X ray 4/11- The femoral and acetabular components are in anatomic alignment. There is no fracture. The visualized pelvis is within normal limits.    REVIEW OF SYSTEMS  Constitutional - No fever, No weight loss, +fatigue  Respiratory - No cough, No wheezing, No shortness of breath  Cardiovascular - No chest pain, No palpitations  Gastrointestinal - No abdominal pain  Neurological - No headaches, +loss of strength  Musculoskeletal + joint pain      VITALS  T(C): 37.4 (04-14-25 @ 11:16), Max: 37.4 (04-14-25 @ 11:16)  HR: 94 (04-14-25 @ 11:16) (73 - 94)  BP: 125/62 (04-14-25 @ 11:16) (111/53 - 125/62)  RR: 18 (04-14-25 @ 11:16) (17 - 19)  SpO2: 94% (04-14-25 @ 11:16) (92% - 94%)  Wt(kg): --    PAST MEDICAL & SURGICAL HISTORY  Asthma    Osteoarthritis    Seasonal allergies    COPD (chronic obstructive pulmonary disease)    Environmental lung disease    HTN (hypertension)    GERD (gastroesophageal reflux disease)    S/P hernia repair    S/P colonoscopy        FUNCTIONAL HISTORY  Lives with wife in a house 3 to enter, 11 to the bedroom, can accommodate a 1st floor setup  Independent prior     CURRENT FUNCTIONAL STATUS  4/14 PT-Progress Summary: Pt seen for PT follow up, tolerated well. Received supine in bed, lines intact, in NAD, abduction pillow in place. Requiring moderate assist for bed mobility. Moderate assist for sit to stand transfer to RW. Moderate assist to ambulate 15 feet in room, severe gait impairment, low functional endurance. Transferred to bedside chair, lines intact, needs met, RN present.     4/14 OT-   Sit-Stand Transfer Training  Sit-to-Stand Transfer Training Charges: Pt enagaged in blocked practice sit to stands x5 trials with minimal-moderate assistance x1 +RW, improved performance throughout, few rest breaks required. Verbal cues to control eccentric descent.   Transfer Training Sit-to-Stand Transfer: moderate assist (50% patient effort);  1 person assist;  verbal cues;  weight-bearing as tolerated   rolling walker  Transfer Training Stand-to-Sit Transfer: moderate assist (50% patient effort);  1 person assist;  verbal cues;  rolling walker;  weight-bearing as tolerated  Sit-to-Stand Transfer Training Transfer Safety Analysis: decreased strength;  impaired balance    Therapeutic Exercise  Therapeutic Exercise Charges: 3x10 bilateral UE knee extension + hold; hip abduction + clinician-applied resistance.     Balance Skills Training  Balance Skills Training Training Strategies: Pt engaged in static/dynamic standing for 2 minutes, lateral weight shifting left/ right to improve endurance, standing tolerance, and balance needed for ADLs (ie toileting, grooming at sink side).     SOCIAL HISTORY - as per documentation/history  Smoking - None  EtOH - None  Drugs - None    FAMILY HISTORY   Family history of lung cancer (Sibling)    Family history of cervical cancer (Sibling)    Family history of coronary artery disease (Sibling)    Family history of diabetes mellitus type II (Sibling)      RECENT LABS - Reviewed  CBC Full  -  ( 14 Apr 2025 09:12 )  WBC Count : 8.85 K/uL  RBC Count : 3.64 M/uL  Hemoglobin : 10.9 g/dL  Hematocrit : 31.7 %  Platelet Count - Automated : 221 K/uL  Mean Cell Volume : 87.1 fl  Mean Cell Hemoglobin : 29.9 pg  Mean Cell Hemoglobin Concentration : 34.4 g/dL  Auto Neutrophil # : x  Auto Lymphocyte # : x  Auto Monocyte # : x  Auto Eosinophil # : x  Auto Basophil # : x  Auto Neutrophil % : x  Auto Lymphocyte % : x  Auto Monocyte % : x  Auto Eosinophil % : x  Auto Basophil % : x    04-14    134[L]  |  102  |  20  ----------------------------<  165[H]  3.8   |  27  |  1.27    Ca    8.0[L]      14 Apr 2025 09:12      Urinalysis Basic - ( 14 Apr 2025 09:12 )    Color: x / Appearance: x / SG: x / pH: x  Gluc: 165 mg/dL / Ketone: x  / Bili: x / Urobili: x   Blood: x / Protein: x / Nitrite: x   Leuk Esterase: x / RBC: x / WBC x   Sq Epi: x / Non Sq Epi: x / Bacteria: x        ALLERGIES  No Known Allergies      MEDICATIONS   acetaminophen     Tablet .. 975 milliGRAM(s) Oral every 8 hours  albuterol    90 MICROgram(s) HFA Inhaler 2 Puff(s) Inhalation every 6 hours PRN  bisacodyl Suppository 10 milliGRAM(s) Rectal daily PRN  buDESOnide    Inhalation Suspension 0.5 milliGRAM(s) Inhalation two times a day  enoxaparin Injectable 40 milliGRAM(s) SubCutaneous every 24 hours  ondansetron Injectable 4 milliGRAM(s) IV Push every 8 hours PRN  oxyCODONE    IR 5 milliGRAM(s) Oral every 4 hours PRN  oxyCODONE    IR 2.5 milliGRAM(s) Oral every 4 hours PRN  pantoprazole    Tablet 40 milliGRAM(s) Oral before breakfast  polyethylene glycol 3350 17 Gram(s) Oral two times a day  senna 2 Tablet(s) Oral at bedtime  valsartan 40 milliGRAM(s) Oral daily      ----------------------------------------------------------------------------------------  PHYSICAL EXAM  Constitutional - NAD, Comfortable  HEENT - NCAT, EOMI  Chest - Breathing comfortably  Cardiovascular - S1S2   - Condom cath in place  Abdomen - Soft   Extremities - No calf tenderness   Neurologic Exam -                    Cognitive - Awake and Alert     Communication - Fluent, No dysarthria     Motor -                     LEFT    UE - ShAB 5/5, EF 5/5, EE 5/5,  5/5                    RIGHT UE - ShAB 5/5, EF 5/5, EE 5/5,  5/5                    LEFT    LE - HF 1/5, KE 1/5, foot drop                    RIGHT LE - HF 4/5, KE 4/5, DF 4/5, PF 4/5     Psychiatric - Mood stable  ----------------------------------------------------------------------------------------  ASSESSMENT/PLAN  79yMale with functional deficits after a fall sustaining a left hip fracture  Left Hip fracture- s/p hemiarthroplasty. pending AFO for left foot drop  HTN- Diovan  Hyponatremia- Na 134, continue to monitor   Pain - Tylenol, oxycodone   Pulm- Pulmicort, Proventil   Bowel- dulcolax suppository, Miralax, senna  DVT PPX -Lovenox   Rehab -  Recommend ACUTE inpatient rehabilitation for the functional deficits consisting of 3 hours of therapy/day & 24 hour RN/daily PMR physician for comorbid medical management. Patient will be able to tolerate 3 hours a day. Recommend ongoing mobilization by staff to maintain cardiopulmonary function and prevention of secondary complications related to debility. Discussed with rehab team.

## 2025-04-15 LAB
ALBUMIN SERPL ELPH-MCNC: 2.3 G/DL — LOW (ref 3.3–5)
ALP SERPL-CCNC: 85 U/L — SIGNIFICANT CHANGE UP (ref 40–120)
ALT FLD-CCNC: 19 U/L — SIGNIFICANT CHANGE UP (ref 10–45)
ANION GAP SERPL CALC-SCNC: 6 MMOL/L — SIGNIFICANT CHANGE UP (ref 5–17)
AST SERPL-CCNC: 26 U/L — SIGNIFICANT CHANGE UP (ref 10–40)
BASOPHILS # BLD AUTO: 0.04 K/UL — SIGNIFICANT CHANGE UP (ref 0–0.2)
BASOPHILS NFR BLD AUTO: 0.5 % — SIGNIFICANT CHANGE UP (ref 0–2)
BILIRUB SERPL-MCNC: 0.8 MG/DL — SIGNIFICANT CHANGE UP (ref 0.2–1.2)
BUN SERPL-MCNC: 17 MG/DL — SIGNIFICANT CHANGE UP (ref 7–23)
CALCIUM SERPL-MCNC: 8.1 MG/DL — LOW (ref 8.4–10.5)
CHLORIDE SERPL-SCNC: 101 MMOL/L — SIGNIFICANT CHANGE UP (ref 96–108)
CO2 SERPL-SCNC: 30 MMOL/L — SIGNIFICANT CHANGE UP (ref 22–31)
CREAT SERPL-MCNC: 1.11 MG/DL — SIGNIFICANT CHANGE UP (ref 0.5–1.3)
EGFR: 67 ML/MIN/1.73M2 — SIGNIFICANT CHANGE UP
EGFR: 67 ML/MIN/1.73M2 — SIGNIFICANT CHANGE UP
EOSINOPHIL # BLD AUTO: 0.5 K/UL — SIGNIFICANT CHANGE UP (ref 0–0.5)
EOSINOPHIL NFR BLD AUTO: 6 % — SIGNIFICANT CHANGE UP (ref 0–6)
GLUCOSE SERPL-MCNC: 121 MG/DL — HIGH (ref 70–99)
HCT VFR BLD CALC: 29.8 % — LOW (ref 39–50)
HGB BLD-MCNC: 9.9 G/DL — LOW (ref 13–17)
IMM GRANULOCYTES NFR BLD AUTO: 1.2 % — HIGH (ref 0–0.9)
LYMPHOCYTES # BLD AUTO: 1.21 K/UL — SIGNIFICANT CHANGE UP (ref 1–3.3)
LYMPHOCYTES # BLD AUTO: 14.5 % — SIGNIFICANT CHANGE UP (ref 13–44)
MCHC RBC-ENTMCNC: 29.3 PG — SIGNIFICANT CHANGE UP (ref 27–34)
MCHC RBC-ENTMCNC: 33.2 G/DL — SIGNIFICANT CHANGE UP (ref 32–36)
MCV RBC AUTO: 88.2 FL — SIGNIFICANT CHANGE UP (ref 80–100)
MONOCYTES # BLD AUTO: 0.91 K/UL — HIGH (ref 0–0.9)
MONOCYTES NFR BLD AUTO: 10.9 % — SIGNIFICANT CHANGE UP (ref 2–14)
NEUTROPHILS # BLD AUTO: 5.56 K/UL — SIGNIFICANT CHANGE UP (ref 1.8–7.4)
NEUTROPHILS NFR BLD AUTO: 66.9 % — SIGNIFICANT CHANGE UP (ref 43–77)
NRBC BLD AUTO-RTO: 0 /100 WBCS — SIGNIFICANT CHANGE UP (ref 0–0)
PLATELET # BLD AUTO: 241 K/UL — SIGNIFICANT CHANGE UP (ref 150–400)
POTASSIUM SERPL-MCNC: 4 MMOL/L — SIGNIFICANT CHANGE UP (ref 3.5–5.3)
POTASSIUM SERPL-SCNC: 4 MMOL/L — SIGNIFICANT CHANGE UP (ref 3.5–5.3)
PROT SERPL-MCNC: 5.6 G/DL — LOW (ref 6–8.3)
RBC # BLD: 3.38 M/UL — LOW (ref 4.2–5.8)
RBC # FLD: 13.2 % — SIGNIFICANT CHANGE UP (ref 10.3–14.5)
SODIUM SERPL-SCNC: 137 MMOL/L — SIGNIFICANT CHANGE UP (ref 135–145)
WBC # BLD: 8.32 K/UL — SIGNIFICANT CHANGE UP (ref 3.8–10.5)
WBC # FLD AUTO: 8.32 K/UL — SIGNIFICANT CHANGE UP (ref 3.8–10.5)

## 2025-04-15 PROCEDURE — 99223 1ST HOSP IP/OBS HIGH 75: CPT

## 2025-04-15 PROCEDURE — 99222 1ST HOSP IP/OBS MODERATE 55: CPT

## 2025-04-15 RX ORDER — ALBUTEROL SULFATE 2.5 MG/3ML
2.5 VIAL, NEBULIZER (ML) INHALATION EVERY 8 HOURS
Refills: 0 | Status: DISCONTINUED | OUTPATIENT
Start: 2025-04-15 | End: 2025-04-22

## 2025-04-15 RX ADMIN — BUDESONIDE 0.5 MILLIGRAM(S): 0.25 SUSPENSION RESPIRATORY (INHALATION) at 21:34

## 2025-04-15 RX ADMIN — BUDESONIDE 0.5 MILLIGRAM(S): 0.25 SUSPENSION RESPIRATORY (INHALATION) at 07:55

## 2025-04-15 RX ADMIN — Medication 975 MILLIGRAM(S): at 13:53

## 2025-04-15 RX ADMIN — POLYETHYLENE GLYCOL 3350 17 GRAM(S): 17 POWDER, FOR SOLUTION ORAL at 05:47

## 2025-04-15 RX ADMIN — OXYCODONE HYDROCHLORIDE 5 MILLIGRAM(S): 30 TABLET ORAL at 07:20

## 2025-04-15 RX ADMIN — Medication 975 MILLIGRAM(S): at 22:39

## 2025-04-15 RX ADMIN — HEPARIN SODIUM 5000 UNIT(S): 1000 INJECTION INTRAVENOUS; SUBCUTANEOUS at 13:53

## 2025-04-15 RX ADMIN — HEPARIN SODIUM 5000 UNIT(S): 1000 INJECTION INTRAVENOUS; SUBCUTANEOUS at 05:44

## 2025-04-15 RX ADMIN — Medication 2.5 MILLIGRAM(S): at 21:36

## 2025-04-15 RX ADMIN — Medication 975 MILLIGRAM(S): at 06:39

## 2025-04-15 RX ADMIN — Medication 40 MILLIGRAM(S): at 05:44

## 2025-04-15 RX ADMIN — HEPARIN SODIUM 5000 UNIT(S): 1000 INJECTION INTRAVENOUS; SUBCUTANEOUS at 21:39

## 2025-04-15 RX ADMIN — Medication 975 MILLIGRAM(S): at 05:44

## 2025-04-15 RX ADMIN — OXYCODONE HYDROCHLORIDE 5 MILLIGRAM(S): 30 TABLET ORAL at 08:10

## 2025-04-15 RX ADMIN — Medication 975 MILLIGRAM(S): at 21:39

## 2025-04-15 RX ADMIN — Medication 2.5 MILLIGRAM(S): at 15:07

## 2025-04-15 NOTE — DIETITIAN INITIAL EVALUATION ADULT - PERTINENT MEDS FT
MEDICATIONS  (STANDING):  acetaminophen     Tablet .. 975 milliGRAM(s) Oral every 8 hours  albuterol    0.083% 2.5 milliGRAM(s) Nebulizer every 8 hours  buDESOnide    Inhalation Suspension 0.5 milliGRAM(s) Inhalation two times a day  heparin   Injectable 5000 Unit(s) SubCutaneous every 8 hours  pantoprazole    Tablet 40 milliGRAM(s) Oral before breakfast  polyethylene glycol 3350 17 Gram(s) Oral two times a day  senna 2 Tablet(s) Oral at bedtime  valsartan 40 milliGRAM(s) Oral daily    MEDICATIONS  (PRN):  albuterol    90 MICROgram(s) HFA Inhaler 2 Puff(s) Inhalation every 6 hours PRN Shortness of Breath  oxyCODONE    IR 5 milliGRAM(s) Oral every 4 hours PRN Severe Pain (7 - 10)  oxyCODONE    IR 2.5 milliGRAM(s) Oral every 4 hours PRN Moderate Pain (4 - 6)

## 2025-04-15 NOTE — CONSULT NOTE ADULT - ASSESSMENT
78yo male w/ hx of Asthma, HTN, presented to the ED accompanied by his wife s/p fall in the driveway while using a leaf blower to clean the driveway, noted on imaging to have Left femoral Neck fracture      #Fall  #Left Femoral neck fracture  -s/p L hip hemiarthroplasty 4/11  -Pain control as per rehab team  -management per ortho; WBAT  -PT/OT/PMR eval  -chronic foot drop - per ortho- carbon fiber AFO. Seen by Fab Holder and measurement taken    #HTN  -Valsartan    #Asthma  #Asbestosis exposure  -Stable on RA  -CXR results noted; Clear lungs  -Continue Inhalers    #VTE ppx  -HSQ per ortho

## 2025-04-15 NOTE — DIETITIAN INITIAL EVALUATION ADULT - ORAL INTAKE PTA/DIET HISTORY
Pt reports fair PO intake PTA. Eats small breakfast + larger dinner that pt's wife cooks. Pt does not follow a specific diet or monitor anything in his diet PTA.

## 2025-04-15 NOTE — DIETITIAN INITIAL EVALUATION ADULT - PERTINENT LABORATORY DATA
04-15    137  |  101  |  17  ----------------------------<  121[H]  4.0   |  30  |  1.11    Ca    8.1[L]      15 Apr 2025 06:27    TPro  5.6[L]  /  Alb  2.3[L]  /  TBili  0.8  /  DBili  x   /  AST  26  /  ALT  19  /  AlkPhos  85  04-15

## 2025-04-15 NOTE — CHART NOTE - NSCHARTNOTEFT_GEN_A_CORE
Pt seen at bedside to asses Prevena that was placed yesterday 04/14 over L hip incision. Pt now POD#4 s/p L hip hemiarthroplasty. Prevena in place, holding suction, no leakage noted, serosang output in tubing but no drainage noted in canister (difficult to quantify amount of drainage). Prevena to be removed on 04/20/25.    Discussed with Dr. Casanova

## 2025-04-15 NOTE — CONSULT NOTE ADULT - SUBJECTIVE AND OBJECTIVE BOX
Patient is a 79y old  Male who presents with a chief complaint of Left femoral neck fracture s/p left hemiarthroplasty (15 Apr 2025 12:45)      HPI:  79M with PMHx of Asthma, HTN, GERD, chronic left foot drop,  h/o asbestosis exposure but no history of COPD, Right TKR, & Left TKR on 2014 (Dr. iPmentel). Presented to the  ED accompanied by his wife s/p fall in the driveway while using a leaf blower on 4/10. Denies LOC or syncopal episode. X-ray of hip/femur with Left femoral Neck fracture- s/p Left hemiarthroplasty on 4/11. Not on anticoagulation. Patient was evaluated by PM&R and therapy for functional deficits, gait/ADL impairments and acute rehabilitation was recommended. Patient was cleared for discharge to [facility] IRU on 4/14/25.        (14 Apr 2025 13:34)      TODAY:  Patient seen and examined in [facility]  No overnight event    PAST MEDICAL & SURGICAL HISTORY:  Asthma  Environmental lung disease  HTN (hypertension)  GERD (gastroesophageal reflux disease)  S/P hernia repair  S/P colonoscopy          FAMILY HISTORY:  Sibling  Still living? Yes, Estimated age: 61-70  Family history of cervical cancer, Age at diagnosis: Age Unknown  Family history of coronary artery disease, Age at diagnosis: Age Unknown  Family history of diabetes mellitus type II, Age at diagnosis: Age Unknown  Family history of lung cancer, Age at diagnosis: Age Unknown.      SOCIAL HISTORY  Smoking - Denied  EtOH - Denied   Drugs - Denied      ALLERGIES:  Allergies    No Known Allergies    Intolerances        albuterol    0.083% 2.5 milliGRAM(s) Nebulizer every 8 hours  heparin   Injectable 5000 Unit(s) SubCutaneous every 8 hours      REVIEW OF SYSTEMS:  CONSTITUTIONAL: No fever, weight loss, or fatigue  EYES: No eye pain, visual disturbances, or discharge  RESPIRATORY: No cough, wheezing, chills or hemoptysis; No shortness of breath  CARDIOVASCULAR: No chest pain, palpitations, dizziness, or leg swelling  GASTROINTESTINAL: No abdominal or epigastric pain. No nausea, vomiting, or hematemesis; No diarrhea or constipation. No melena or hematochezia.  GENITOURINARY: No dysuria, frequency, hematuria, or incontinence  NEUROLOGICAL: No headaches, memory loss, loss of strength, numbness, or tremors  SKIN: No itching, burning, rashes, or lesions   MUSCULOSKELETAL: No joint pain or swelling; No muscle, back, or extremity pain  PSYCHIATRIC: No depression, anxiety, mood swings, or difficulty sleeping    ALL OTHER SYSTEMS REVIEWED AND ARE NEGATIVE    VITAL SIGNS:  T(C): 36.7 (04-15-25 @ 07:10), Max: 37.2 (04-14-25 @ 20:26)  HR: 67 (04-15-25 @ 07:57) (65 - 83)  BP: 136/65 (04-15-25 @ 07:10) (136/65 - 159/56)  RR: 16 (04-15-25 @ 07:10) (16 - 16)  SpO2: 97% (04-15-25 @ 07:57) (94% - 98%)  Wt(kg): --Vital Signs Last 24 Hrs  T(C): 36.7 (15 Apr 2025 07:10), Max: 37.2 (14 Apr 2025 20:26)  T(F): 98 (15 Apr 2025 07:10), Max: 99 (14 Apr 2025 20:26)  HR: 67 (15 Apr 2025 07:57) (65 - 83)  BP: 136/65 (15 Apr 2025 07:10) (136/65 - 159/56)  BP(mean): --  RR: 16 (15 Apr 2025 07:10) (16 - 16)  SpO2: 97% (15 Apr 2025 07:57) (94% - 98%)    Parameters below as of 15 Apr 2025 07:57  Patient On (Oxygen Delivery Method): room air            LABS:                        9.9    8.32  )-----------( 241      ( 15 Apr 2025 06:27 )             29.8     04-15    137  |  101  |  17  ----------------------------<  121[H]  4.0   |  30  |  1.11    Ca    8.1[L]      15 Apr 2025 06:27    TPro  5.6[L]  /  Alb  2.3[L]  /  TBili  0.8  /  DBili  x   /  AST  26  /  ALT  19  /  AlkPhos  85  04-15      Urinalysis Basic - ( 15 Apr 2025 06:27 )    Color: x / Appearance: x / SG: x / pH: x  Gluc: 121 mg/dL / Ketone: x  / Bili: x / Urobili: x   Blood: x / Protein: x / Nitrite: x   Leuk Esterase: x / RBC: x / WBC x   Sq Epi: x / Non Sq Epi: x / Bacteria: x       CAPILLARY BLOOD GLUCOSE            Urinalysis Basic - ( 15 Apr 2025 06:27 )    Color: x / Appearance: x / SG: x / pH: x  Gluc: 121 mg/dL / Ketone: x  / Bili: x / Urobili: x   Blood: x / Protein: x / Nitrite: x   Leuk Esterase: x / RBC: x / WBC x   Sq Epi: x / Non Sq Epi: x / Bacteria: x          Previous Consultant(s) Notes Reviewed:  YES  Care Discussed with Consultants/Other Providers YES  Previous Imaging Personally Reviewed:  YES Patient is a 79y old  Male who presents with a chief complaint of Left femoral neck fracture s/p left hemiarthroplasty (15 Apr 2025 12:45)      HPI:  79M with PMHx of Asthma, HTN, GERD, chronic left foot drop,  h/o asbestosis exposure but no history of COPD, Right TKR, & Left TKR on 2014 (Dr. Pimentel). Presented to the  ED accompanied by his wife s/p fall in the driveway while using a leaf blower on 4/10. Denies LOC or syncopal episode. X-ray of hip/femur with Left femoral Neck fracture- s/p Left hemiarthroplasty on 4/11. Not on anticoagulation. Patient was evaluated by PM&R and therapy for functional deficits, gait/ADL impairments and acute rehabilitation was recommended. Patient was cleared for discharge to [facility] IRU on 4/14/25.        (14 Apr 2025 13:34)      TODAY:  Patient seen and examined in [facility]  No overnight event    PAST MEDICAL & SURGICAL HISTORY:  Asthma  Environmental lung disease  HTN (hypertension)  GERD (gastroesophageal reflux disease)  S/P hernia repair  S/P colonoscopy          FAMILY HISTORY:  Sibling  Still living? Yes, Estimated age: 61-70  Family history of cervical cancer, Age at diagnosis: Age Unknown  Family history of coronary artery disease, Age at diagnosis: Age Unknown  Family history of diabetes mellitus type II, Age at diagnosis: Age Unknown  Family history of lung cancer, Age at diagnosis: Age Unknown.      SOCIAL HISTORY  Smoking - Denied  EtOH - Denied   Drugs - Denied      ALLERGIES:  Allergies    No Known Allergies    Intolerances        albuterol    0.083% 2.5 milliGRAM(s) Nebulizer every 8 hours  heparin   Injectable 5000 Unit(s) SubCutaneous every 8 hours      REVIEW OF SYSTEMS:  CONSTITUTIONAL: No fever, weight loss, or fatigue  EYES: No eye pain, visual disturbances, or discharge  RESPIRATORY: No cough, wheezing, chills or hemoptysis; No shortness of breath  CARDIOVASCULAR: No chest pain, palpitations, dizziness, or leg swelling  GASTROINTESTINAL: No abdominal or epigastric pain. No nausea, vomiting, or hematemesis; No diarrhea or constipation. No melena or hematochezia.  GENITOURINARY: No dysuria, frequency, hematuria, or incontinence  NEUROLOGICAL: No headaches, memory loss, loss of strength, numbness, or tremors  SKIN: No itching, burning, rashes, or lesions   MUSCULOSKELETAL: No joint pain or swelling; No muscle, back, or extremity pain  PSYCHIATRIC: No depression, anxiety, mood swings, or difficulty sleeping    ALL OTHER SYSTEMS REVIEWED AND ARE NEGATIVE    VITAL SIGNS:  T(C): 36.7 (04-15-25 @ 07:10), Max: 37.2 (04-14-25 @ 20:26)  HR: 67 (04-15-25 @ 07:57) (65 - 83)  BP: 136/65 (04-15-25 @ 07:10) (136/65 - 159/56)  RR: 16 (04-15-25 @ 07:10) (16 - 16)  SpO2: 97% (04-15-25 @ 07:57) (94% - 98%)  Wt(kg): --Vital Signs Last 24 Hrs  T(C): 36.7 (15 Apr 2025 07:10), Max: 37.2 (14 Apr 2025 20:26)  T(F): 98 (15 Apr 2025 07:10), Max: 99 (14 Apr 2025 20:26)  HR: 67 (15 Apr 2025 07:57) (65 - 83)  BP: 136/65 (15 Apr 2025 07:10) (136/65 - 159/56)  BP(mean): --  RR: 16 (15 Apr 2025 07:10) (16 - 16)  SpO2: 97% (15 Apr 2025 07:57) (94% - 98%)    Parameters below as of 15 Apr 2025 07:57  Patient On (Oxygen Delivery Method): room air     Gen - NAD, Comfortable  HEENT - NCAT, EOMI  Neck - Supple  Pulm - CTAB, No wheeze, No rhonchi, No crackles  Cardiovascular - RRR, S1S2  Abdomen - Soft, NT/ND  Extremities - No C/C/E, No calf tenderness  Neuro- AAOx3, follows commands  Psychiatric - Mood stable, Affect WNL          LABS:                        9.9    8.32  )-----------( 241      ( 15 Apr 2025 06:27 )             29.8     04-15    137  |  101  |  17  ----------------------------<  121[H]  4.0   |  30  |  1.11    Ca    8.1[L]      15 Apr 2025 06:27    TPro  5.6[L]  /  Alb  2.3[L]  /  TBili  0.8  /  DBili  x   /  AST  26  /  ALT  19  /  AlkPhos  85  04-15      Urinalysis Basic - ( 15 Apr 2025 06:27 )    Color: x / Appearance: x / SG: x / pH: x  Gluc: 121 mg/dL / Ketone: x  / Bili: x / Urobili: x   Blood: x / Protein: x / Nitrite: x   Leuk Esterase: x / RBC: x / WBC x   Sq Epi: x / Non Sq Epi: x / Bacteria: x       CAPILLARY BLOOD GLUCOSE            Urinalysis Basic - ( 15 Apr 2025 06:27 )    Color: x / Appearance: x / SG: x / pH: x  Gluc: 121 mg/dL / Ketone: x  / Bili: x / Urobili: x   Blood: x / Protein: x / Nitrite: x   Leuk Esterase: x / RBC: x / WBC x   Sq Epi: x / Non Sq Epi: x / Bacteria: x          Previous Consultant(s) Notes Reviewed:  YES  Care Discussed with Consultants/Other Providers YES  Previous Imaging Personally Reviewed:  YES

## 2025-04-16 PROCEDURE — 99232 SBSQ HOSP IP/OBS MODERATE 35: CPT

## 2025-04-16 PROCEDURE — 99233 SBSQ HOSP IP/OBS HIGH 50: CPT

## 2025-04-16 RX ORDER — MELATONIN 5 MG
6 TABLET ORAL AT BEDTIME
Refills: 0 | Status: DISCONTINUED | OUTPATIENT
Start: 2025-04-16 | End: 2025-04-17

## 2025-04-16 RX ADMIN — HEPARIN SODIUM 5000 UNIT(S): 1000 INJECTION INTRAVENOUS; SUBCUTANEOUS at 22:05

## 2025-04-16 RX ADMIN — Medication 975 MILLIGRAM(S): at 14:02

## 2025-04-16 RX ADMIN — BUDESONIDE 0.5 MILLIGRAM(S): 0.25 SUSPENSION RESPIRATORY (INHALATION) at 21:06

## 2025-04-16 RX ADMIN — Medication 975 MILLIGRAM(S): at 22:04

## 2025-04-16 RX ADMIN — Medication 40 MILLIGRAM(S): at 05:18

## 2025-04-16 RX ADMIN — Medication 975 MILLIGRAM(S): at 15:00

## 2025-04-16 RX ADMIN — BUDESONIDE 0.5 MILLIGRAM(S): 0.25 SUSPENSION RESPIRATORY (INHALATION) at 08:44

## 2025-04-16 RX ADMIN — Medication 2.5 MILLIGRAM(S): at 08:44

## 2025-04-16 RX ADMIN — Medication 2.5 MILLIGRAM(S): at 21:07

## 2025-04-16 RX ADMIN — Medication 2.5 MILLIGRAM(S): at 15:23

## 2025-04-16 RX ADMIN — Medication 6 MILLIGRAM(S): at 22:05

## 2025-04-16 RX ADMIN — HEPARIN SODIUM 5000 UNIT(S): 1000 INJECTION INTRAVENOUS; SUBCUTANEOUS at 05:19

## 2025-04-16 RX ADMIN — Medication 975 MILLIGRAM(S): at 05:18

## 2025-04-16 RX ADMIN — HEPARIN SODIUM 5000 UNIT(S): 1000 INJECTION INTRAVENOUS; SUBCUTANEOUS at 14:03

## 2025-04-16 NOTE — PROGRESS NOTE ADULT - ASSESSMENT
80yo male w/ hx of Asthma, HTN, presented to the ED accompanied by his wife s/p fall in the driveway while using a leaf blower to clean the driveway, noted on imaging to have Left femoral Neck fracture      #Fall  #Left Femoral neck fracture  -s/p L hip hemiarthroplasty 4/11  -Pain control as per rehab team  -management per ortho; WBAT  -PT/OT/PMR eval  -chronic foot drop - per ortho- carbon fiber AFO. Seen by Fab Holder and measurement taken    #HTN  -Valsartan    #Asthma  #Asbestosis exposure  -Stable on RA  -CXR results noted; Clear lungs  -Continue Inhalers    #VTE ppx  -HSQ per ortho

## 2025-04-16 NOTE — PROGRESS NOTE ADULT - SUBJECTIVE AND OBJECTIVE BOX
CC: S/p L hip hemiarthroplasty     Today's Subjective & Objective Findings:  Patient seen and examined at bedside this AM.  Daughter present.  Admits to poor sleep.  Overall feeling well.  Temporary L AFO in use, await custom AFO.     Denies headache, dizziness, visual changes, chest pain, SOB/ROQUE, abdominal pain, nausea, vomiting, diarrhea, dysuria, numbness or tingling.    Therapy-- engaging, motivated    VITALS  T(C): 36.8 (04-16-25 @ 07:57), Max: 36.8 (04-16-25 @ 07:57)  HR: 80 (04-16-25 @ 07:57) (63 - 80)  BP: 147/68 (04-16-25 @ 07:57) (147/68 - 149/73)  RR: 16 (04-16-25 @ 07:57) (16 - 16)  SpO2: 95% (04-16-25 @ 07:57) (94% - 98%)      PHYSICAL EXAM  Constitutional - No acute distress, Comfortable  Neck - Supple  Cardiovascular - Palpable peripheral pulses   Respiratory/Chest- Symmetrical chest expansion, No dyspnea  Abdomen - Soft, Non-tender  Extremities - No cyanosis, No edema,   Neurologic Exam - Alert, Oriented, Interactive  Sensory - Light touch sensation intact  Motor Function - Moves all extremities spontaneously  Skin -  left hip incision covered with Aquacel (saturated on admission,), left hip ecchymosis        LABS:                        9.9    8.32  )-----------( 241      ( 15 Apr 2025 06:27 )             29.8     04-15    137  |  101  |  17  ----------------------------<  121[H]  4.0   |  30  |  1.11    Ca    8.1[L]      15 Apr 2025 06:27    TPro  5.6[L]  /  Alb  2.3[L]  /  TBili  0.8  /  DBili  x   /  AST  26  /  ALT  19  /  AlkPhos  85  04-15      Urinalysis Basic - ( 15 Apr 2025 06:27 )    Color: x / Appearance: x / SG: x / pH: x  Gluc: 121 mg/dL / Ketone: x  / Bili: x / Urobili: x   Blood: x / Protein: x / Nitrite: x   Leuk Esterase: x / RBC: x / WBC x   Sq Epi: x / Non Sq Epi: x / Bacteria: x      CAPILLARY BLOOD GLUCOSE        MEDICATIONS  (STANDING):  acetaminophen     Tablet .. 975 milliGRAM(s) Oral every 8 hours  albuterol    0.083% 2.5 milliGRAM(s) Nebulizer every 8 hours  buDESOnide    Inhalation Suspension 0.5 milliGRAM(s) Inhalation two times a day  heparin   Injectable 5000 Unit(s) SubCutaneous every 8 hours  melatonin 6 milliGRAM(s) Oral at bedtime  pantoprazole    Tablet 40 milliGRAM(s) Oral before breakfast  polyethylene glycol 3350 17 Gram(s) Oral two times a day  senna 2 Tablet(s) Oral at bedtime  valsartan 40 milliGRAM(s) Oral daily    MEDICATIONS  (PRN):  albuterol    90 MICROgram(s) HFA Inhaler 2 Puff(s) Inhalation every 6 hours PRN Shortness of Breath  oxyCODONE    IR 5 milliGRAM(s) Oral every 4 hours PRN Severe Pain (7 - 10)  oxyCODONE    IR 2.5 milliGRAM(s) Oral every 4 hours PRN Moderate Pain (4 - 6)   CC: S/p L hip hemiarthroplasty     Today's Subjective & Objective Findings:  Patient seen and examined at bedside this AM.  Daughter present.  Reports improved sleep and energy  Overall feeling well.  Temporary L AFO in use, await custom AFO.     Denies headache, dizziness, visual changes, chest pain, SOB/ROQUE, abdominal pain, nausea, vomiting, diarrhea, dysuria, numbness or tingling.  LBM 4/15    Therapy  Ambulated 50, 100ft with RW min A +left LE AFO donned with improved clearance of  left LEs noted.  Patient with slight increased internal rotation of left LE, provided with verbal  cue for correction. Patient step to pattern, educated patient on eventual  progression to step through.    VITALS  T(C): 36.8 (04-16-25 @ 07:57), Max: 36.8 (04-16-25 @ 07:57)  HR: 80 (04-16-25 @ 07:57) (63 - 80)  BP: 147/68 (04-16-25 @ 07:57) (147/68 - 149/73)  RR: 16 (04-16-25 @ 07:57) (16 - 16)  SpO2: 95% (04-16-25 @ 07:57) (94% - 98%)      PHYSICAL EXAM  Constitutional - No acute distress, Comfortable  Neck - Supple  Cardiovascular - Palpable peripheral pulses   Respiratory/Chest- Symmetrical chest expansion, No dyspnea  Abdomen - Soft, Non-tender  Extremities - No cyanosis, No edema,   Neurologic Exam - Alert, Oriented, Interactive  Sensory - Light touch sensation intact  Motor Function - Moves all extremities spontaneously  Skin -  left hip incision covered with Aquacel (saturated on admission,), left hip ecchymosis, no active bleed         LABS:                        9.9    8.32  )-----------( 241      ( 15 Apr 2025 06:27 )             29.8     04-15    137  |  101  |  17  ----------------------------<  121[H]  4.0   |  30  |  1.11    Ca    8.1[L]      15 Apr 2025 06:27    TPro  5.6[L]  /  Alb  2.3[L]  /  TBili  0.8  /  DBili  x   /  AST  26  /  ALT  19  /  AlkPhos  85  04-15      Urinalysis Basic - ( 15 Apr 2025 06:27 )    Color: x / Appearance: x / SG: x / pH: x  Gluc: 121 mg/dL / Ketone: x  / Bili: x / Urobili: x   Blood: x / Protein: x / Nitrite: x   Leuk Esterase: x / RBC: x / WBC x   Sq Epi: x / Non Sq Epi: x / Bacteria: x      CAPILLARY BLOOD GLUCOSE        MEDICATIONS  (STANDING):  acetaminophen     Tablet .. 975 milliGRAM(s) Oral every 8 hours  albuterol    0.083% 2.5 milliGRAM(s) Nebulizer every 8 hours  buDESOnide    Inhalation Suspension 0.5 milliGRAM(s) Inhalation two times a day  heparin   Injectable 5000 Unit(s) SubCutaneous every 8 hours  melatonin 6 milliGRAM(s) Oral at bedtime  pantoprazole    Tablet 40 milliGRAM(s) Oral before breakfast  polyethylene glycol 3350 17 Gram(s) Oral two times a day  senna 2 Tablet(s) Oral at bedtime  valsartan 40 milliGRAM(s) Oral daily    MEDICATIONS  (PRN):  albuterol    90 MICROgram(s) HFA Inhaler 2 Puff(s) Inhalation every 6 hours PRN Shortness of Breath  oxyCODONE    IR 5 milliGRAM(s) Oral every 4 hours PRN Severe Pain (7 - 10)  oxyCODONE    IR 2.5 milliGRAM(s) Oral every 4 hours PRN Moderate Pain (4 - 6)

## 2025-04-16 NOTE — PROGRESS NOTE ADULT - NS ATTEND AMEND GEN_ALL_CORE FT
I have made amendments to the documentation where necessary. Additional comments: I have personally seen and examined the patient independently Medical records reviewed. I have made amendments to the documentation where necessary and adjusted the history, physical examination, and plan as documented by the NP.     Patient seen with daughter  improvement with sleep and energy    Engaging in therapy ambulating 50ft    Regular urine void  Got temporary left AFO, which is beneficial    Continue therapy   DVT ppx I have made amendments to the documentation where necessary. Additional comments: I have personally seen and examined the patient independently Medical records reviewed. I have made amendments to the documentation where necessary and adjusted the history, physical examination, and plan as documented by the NP.     Patient seen with daughter  improvement with sleep and energy    Engaging in therapy ambulating 50ft    Regular urine void  Got temporary left AFO, which is beneficial    Continue therapy   DVT ppx    Spent 53 mins, patient review, discussion of treatment and update to family

## 2025-04-16 NOTE — PROGRESS NOTE ADULT - ASSESSMENT
79M with pMHx of Asthma, HTN, GERD, chronic left foot drop,  h/o asbestosis exposure but no history of COPD- presented to the  ED accompanied by his wife s/p fall in the driveway while using a leaf blower on 4/10. X-ray of hip/femur with Left femoral Neck fracture- s/p Left hemiarthroplasty on 4/11. Not on anticoagulation. Patient with Gait Instability, ADL impairments and Functional impairments.    * Left foot drop - temporary AFO in use - await custom AFO  * Asthma/Asbestosis exposure: Mod wheeze on exam--Continue resp treatments, will get CXR if none recently, f/u with hospitalist  * Hx of cognitive deficits and Left knee pain despite previous replacement  * Collateral hx obtained from daughter--hx of cognitive deficits, chronic left knee pain and reduced ROM despite previous replacement, patient declined f/u to address it      #  Left femoral Neck fracture- s/p Left hemiarthroplasty on 4/11.  Gait Instability, ADL impairments and Functional impairments  - [facility]  - Posterior hip precautions    #Chronic Left Foot Drop  -Ankle Foot Othosis, carbon fiber: Daily wear for Foot Drop when OOB    #Pain control  - Acetaminophen 975 mg q8 hours  -oxyCODONE 5mg PRN q4 hours As needed Severe Pain (7 - 10)    #Asthma/#Asbestosis exposure  -Advair Diskus 250 mcg-50 mcg 1 puff inhaled BID  -Albuterol CFC free 90 mcg/inh inhalation aerosol: 2 puff(s) inhaled every 4 hours, PRN For SOB  -Arformoterol 15 mcg/2 mL inhalation solution:   -Budesonide 0.5 mg/2 mL inhalation suspension: inhaled once a day    #HTN  -Valsartan 40mg daily    #GI/Bowel Mgmt/#GERD  - Continue Senna at bedtime   - Miralax   - Polyethylene glycol BID  - Protonix 40 mg     #Bladder management  - Continue to monitor PVR q 8 hours (SC if > 400) x once on admission   - Monitor UO    #DVT prophylaxis   - Heparin TID  - TEDs     #Skin:  - left hip incision covered with Aquacel (saturated on admission) --> surgery to place pravena vac    FEN   - Diet - DASH Diet    Precautions / PROPHYLAXIS:   - Falls,  - Ortho: Weight bearing status: WBAT   - Lungs: Aspiration  - Pressure injury/Skin: OOB to Chair, PT/OT    -------------------  liaison with family/providers  4/15--Collateral hx obtained from daughter--hx of cognitive deficits, chronic left knee pain and reduced ROM despite previous replacement, patient declined f/u to address it  -----------------------     79M with pMHx of Asthma, HTN, GERD, chronic left foot drop,  h/o asbestosis exposure but no history of COPD- presented to the  ED accompanied by his wife s/p fall in the driveway while using a leaf blower on 4/10. X-ray of hip/femur with Left femoral Neck fracture- s/p Left hemiarthroplasty on 4/11. Not on anticoagulation. Patient with Gait Instability, ADL impairments and Functional impairments.    * Left foot drop - temporary AFO in use - await custom AFO, c/w current temporary AFO  * Hx of cognitive deficits and Left knee pain despite previous replacement  * Improvement with ambulatory distance and breathing  * Will reduce heparin to bid with improved ambulatory distance > 100ft       #  Left femoral Neck fracture- s/p Left hemiarthroplasty on 4/11.  Gait Instability, ADL impairments and Functional impairments  - [facility]  - Posterior hip precautions    #Chronic Left Foot Drop  -Ankle Foot Othosis, carbon fiber: Daily wear for Foot Drop when OOB  - c/w current temporary AFO from orthotist, while awaiting definitive AFO    #Pain control  - Acetaminophen 975 mg q8 hours  -oxyCODONE 5mg PRN q4 hours As needed Severe Pain (7 - 10)    #Asthma/#Asbestosis exposure  -Advair Diskus 250 mcg-50 mcg 1 puff inhaled BID  -Albuterol CFC free 90 mcg/inh inhalation aerosol: 2 puff(s) inhaled every 4 hours, PRN For SOB  -Arformoterol 15 mcg/2 mL inhalation solution:   -Budesonide 0.5 mg/2 mL inhalation suspension: inhaled once a day    #HTN  -Valsartan 40mg daily    #GI/Bowel Mgmt/#GERD  - Continue Senna at bedtime   - Miralax   - Polyethylene glycol BID  - Protonix 40 mg     #Bladder management--voiding     #DVT prophylaxis   - Heparin TID      #Skin:  - left hip incision covered with Aquacel (saturated on admission) --> surgery to place pravena vac    FEN   - Diet - DASH Diet    Precautions / PROPHYLAXIS:   - Falls,  - Ortho: Weight bearing status: WBAT   - Lungs: Aspiration  - Pressure injury/Skin: OOB to Chair, PT/OT    -------------------  liaison with family/providers  4/15--Collateral hx obtained from daughter--hx of cognitive deficits, chronic left knee pain and reduced ROM despite previous replacement, patient declined f/u to address it  -----------------------

## 2025-04-16 NOTE — PROGRESS NOTE ADULT - SUBJECTIVE AND OBJECTIVE BOX
PROGRESS NOTE:     Patient is a 79y old  Male who presents with a chief complaint of Left femoral neck fracture s/p left hemiarthroplasty (16 Apr 2025 11:22)      SUBJECTIVE / OVERNIGHT EVENTS: pt was seen and evaluated today  no complains offered  resting comfortably today    ADDITIONAL REVIEW OF SYSTEMS:    MEDICATIONS  (STANDING):  acetaminophen     Tablet .. 975 milliGRAM(s) Oral every 8 hours  albuterol    0.083% 2.5 milliGRAM(s) Nebulizer every 8 hours  buDESOnide    Inhalation Suspension 0.5 milliGRAM(s) Inhalation two times a day  heparin   Injectable 5000 Unit(s) SubCutaneous every 8 hours  melatonin 6 milliGRAM(s) Oral at bedtime  pantoprazole    Tablet 40 milliGRAM(s) Oral before breakfast  polyethylene glycol 3350 17 Gram(s) Oral two times a day  senna 2 Tablet(s) Oral at bedtime  valsartan 40 milliGRAM(s) Oral daily    MEDICATIONS  (PRN):  albuterol    90 MICROgram(s) HFA Inhaler 2 Puff(s) Inhalation every 6 hours PRN Shortness of Breath  oxyCODONE    IR 5 milliGRAM(s) Oral every 4 hours PRN Severe Pain (7 - 10)  oxyCODONE    IR 2.5 milliGRAM(s) Oral every 4 hours PRN Moderate Pain (4 - 6)      CAPILLARY BLOOD GLUCOSE        I&O's Summary      PHYSICAL EXAM:  Vital Signs Last 24 Hrs  T(C): 36.8 (16 Apr 2025 07:57), Max: 36.8 (16 Apr 2025 07:57)  T(F): 98.2 (16 Apr 2025 07:57), Max: 98.2 (16 Apr 2025 07:57)  HR: 80 (16 Apr 2025 07:57) (63 - 80)  BP: 147/68 (16 Apr 2025 07:57) (147/68 - 149/73)  BP(mean): --  RR: 16 (16 Apr 2025 07:57) (16 - 16)  SpO2: 95% (16 Apr 2025 07:57) (94% - 98%)    Parameters below as of 16 Apr 2025 07:57  Patient On (Oxygen Delivery Method): room air        Gen - NAD, Comfortable  HEENT - NCAT, EOMI  Neck - Supple  Pulm - CTAB, No wheeze, No rhonchi, No crackles  Cardiovascular - RRR, S1S2  Abdomen - Soft, NT/ND  Extremities - No C/C/E, No calf tenderness  Neuro- AAOx3, follows commands  Psychiatric - Mood stable, Affect WNL      LABS:                        9.9    8.32  )-----------( 241      ( 15 Apr 2025 06:27 )             29.8     04-15    137  |  101  |  17  ----------------------------<  121[H]  4.0   |  30  |  1.11    Ca    8.1[L]      15 Apr 2025 06:27    TPro  5.6[L]  /  Alb  2.3[L]  /  TBili  0.8  /  DBili  x   /  AST  26  /  ALT  19  /  AlkPhos  85  04-15          Urinalysis Basic - ( 15 Apr 2025 06:27 )    Color: x / Appearance: x / SG: x / pH: x  Gluc: 121 mg/dL / Ketone: x  / Bili: x / Urobili: x   Blood: x / Protein: x / Nitrite: x   Leuk Esterase: x / RBC: x / WBC x   Sq Epi: x / Non Sq Epi: x / Bacteria: x          RADIOLOGY & ADDITIONAL TESTS:  Results Reviewed:   Imaging Personally Reviewed:  Electrocardiogram Personally Reviewed:    COORDINATION OF CARE:  Care Discussed with Consultants/Other Providers [Y/N]:  Prior or Outpatient Records Reviewed [Y/N]:

## 2025-04-17 LAB
ALBUMIN SERPL ELPH-MCNC: 2.4 G/DL — LOW (ref 3.3–5)
ALP SERPL-CCNC: 103 U/L — SIGNIFICANT CHANGE UP (ref 40–120)
ALT FLD-CCNC: 23 U/L — SIGNIFICANT CHANGE UP (ref 10–45)
ANION GAP SERPL CALC-SCNC: 6 MMOL/L — SIGNIFICANT CHANGE UP (ref 5–17)
AST SERPL-CCNC: 28 U/L — SIGNIFICANT CHANGE UP (ref 10–40)
BASOPHILS # BLD AUTO: 0.04 K/UL — SIGNIFICANT CHANGE UP (ref 0–0.2)
BASOPHILS NFR BLD AUTO: 0.5 % — SIGNIFICANT CHANGE UP (ref 0–2)
BILIRUB SERPL-MCNC: 0.9 MG/DL — SIGNIFICANT CHANGE UP (ref 0.2–1.2)
BUN SERPL-MCNC: 17 MG/DL — SIGNIFICANT CHANGE UP (ref 7–23)
CALCIUM SERPL-MCNC: 8.6 MG/DL — SIGNIFICANT CHANGE UP (ref 8.4–10.5)
CHLORIDE SERPL-SCNC: 103 MMOL/L — SIGNIFICANT CHANGE UP (ref 96–108)
CO2 SERPL-SCNC: 29 MMOL/L — SIGNIFICANT CHANGE UP (ref 22–31)
CREAT SERPL-MCNC: 1.05 MG/DL — SIGNIFICANT CHANGE UP (ref 0.5–1.3)
EGFR: 71 ML/MIN/1.73M2 — SIGNIFICANT CHANGE UP
EGFR: 71 ML/MIN/1.73M2 — SIGNIFICANT CHANGE UP
EOSINOPHIL # BLD AUTO: 0.41 K/UL — SIGNIFICANT CHANGE UP (ref 0–0.5)
EOSINOPHIL NFR BLD AUTO: 5.1 % — SIGNIFICANT CHANGE UP (ref 0–6)
GLUCOSE SERPL-MCNC: 122 MG/DL — HIGH (ref 70–99)
HCT VFR BLD CALC: 31.1 % — LOW (ref 39–50)
HGB BLD-MCNC: 10.4 G/DL — LOW (ref 13–17)
IMM GRANULOCYTES NFR BLD AUTO: 2.1 % — HIGH (ref 0–0.9)
LYMPHOCYTES # BLD AUTO: 1.33 K/UL — SIGNIFICANT CHANGE UP (ref 1–3.3)
LYMPHOCYTES # BLD AUTO: 16.5 % — SIGNIFICANT CHANGE UP (ref 13–44)
MCHC RBC-ENTMCNC: 29.5 PG — SIGNIFICANT CHANGE UP (ref 27–34)
MCHC RBC-ENTMCNC: 33.4 G/DL — SIGNIFICANT CHANGE UP (ref 32–36)
MCV RBC AUTO: 88.1 FL — SIGNIFICANT CHANGE UP (ref 80–100)
MONOCYTES # BLD AUTO: 0.78 K/UL — SIGNIFICANT CHANGE UP (ref 0–0.9)
MONOCYTES NFR BLD AUTO: 9.7 % — SIGNIFICANT CHANGE UP (ref 2–14)
NEUTROPHILS # BLD AUTO: 5.33 K/UL — SIGNIFICANT CHANGE UP (ref 1.8–7.4)
NEUTROPHILS NFR BLD AUTO: 66.1 % — SIGNIFICANT CHANGE UP (ref 43–77)
NRBC BLD AUTO-RTO: 0 /100 WBCS — SIGNIFICANT CHANGE UP (ref 0–0)
PLATELET # BLD AUTO: 321 K/UL — SIGNIFICANT CHANGE UP (ref 150–400)
POTASSIUM SERPL-MCNC: 4.1 MMOL/L — SIGNIFICANT CHANGE UP (ref 3.5–5.3)
POTASSIUM SERPL-SCNC: 4.1 MMOL/L — SIGNIFICANT CHANGE UP (ref 3.5–5.3)
PROT SERPL-MCNC: 5.9 G/DL — LOW (ref 6–8.3)
RBC # BLD: 3.53 M/UL — LOW (ref 4.2–5.8)
RBC # FLD: 13.3 % — SIGNIFICANT CHANGE UP (ref 10.3–14.5)
SODIUM SERPL-SCNC: 138 MMOL/L — SIGNIFICANT CHANGE UP (ref 135–145)
WBC # BLD: 8.06 K/UL — SIGNIFICANT CHANGE UP (ref 3.8–10.5)
WBC # FLD AUTO: 8.06 K/UL — SIGNIFICANT CHANGE UP (ref 3.8–10.5)

## 2025-04-17 PROCEDURE — 99232 SBSQ HOSP IP/OBS MODERATE 35: CPT

## 2025-04-17 PROCEDURE — 99233 SBSQ HOSP IP/OBS HIGH 50: CPT

## 2025-04-17 RX ORDER — MELATONIN 5 MG
9 TABLET ORAL AT BEDTIME
Refills: 0 | Status: DISCONTINUED | OUTPATIENT
Start: 2025-04-17 | End: 2025-04-29

## 2025-04-17 RX ADMIN — BUDESONIDE 0.5 MILLIGRAM(S): 0.25 SUSPENSION RESPIRATORY (INHALATION) at 08:00

## 2025-04-17 RX ADMIN — Medication 40 MILLIGRAM(S): at 05:44

## 2025-04-17 RX ADMIN — HEPARIN SODIUM 5000 UNIT(S): 1000 INJECTION INTRAVENOUS; SUBCUTANEOUS at 05:44

## 2025-04-17 RX ADMIN — Medication 40 MILLIGRAM(S): at 05:43

## 2025-04-17 RX ADMIN — Medication 9 MILLIGRAM(S): at 21:39

## 2025-04-17 RX ADMIN — HEPARIN SODIUM 5000 UNIT(S): 1000 INJECTION INTRAVENOUS; SUBCUTANEOUS at 21:39

## 2025-04-17 RX ADMIN — Medication 2.5 MILLIGRAM(S): at 15:28

## 2025-04-17 RX ADMIN — Medication 2.5 MILLIGRAM(S): at 21:00

## 2025-04-17 RX ADMIN — HEPARIN SODIUM 5000 UNIT(S): 1000 INJECTION INTRAVENOUS; SUBCUTANEOUS at 12:46

## 2025-04-17 RX ADMIN — Medication 975 MILLIGRAM(S): at 12:47

## 2025-04-17 RX ADMIN — BUDESONIDE 0.5 MILLIGRAM(S): 0.25 SUSPENSION RESPIRATORY (INHALATION) at 21:00

## 2025-04-17 RX ADMIN — Medication 2.5 MILLIGRAM(S): at 08:00

## 2025-04-17 RX ADMIN — Medication 975 MILLIGRAM(S): at 05:44

## 2025-04-17 NOTE — PROGRESS NOTE ADULT - NS ATTEND AMEND GEN_ALL_CORE FT
I have made amendments to the documentation where necessary. Additional comments: I have personally seen and examined the patient independently Medical records reviewed. I have made amendments to the documentation where necessary and adjusted the history, physical examination, and plan as documented by the NP.     Patient seen with daughter at bedside  Slept better   Pain controlled    Making progress in therapy as stated    IDT function as noted    Continue therapy   DVT ppx--reduce heparin to bid  F/u surgical recs for Left hip preveena dressing       Spent 52 mins, patient review, discussion of treatment plan and care co ordination

## 2025-04-17 NOTE — PROGRESS NOTE ADULT - SUBJECTIVE AND OBJECTIVE BOX
Called to assess beeping Prevena    pt seen and examined at bedside  Prevena wound vac in place, holding suction properly  Prevena restarted, beeping resolved    will remove it on 4/20    recall prn

## 2025-04-17 NOTE — PROGRESS NOTE ADULT - SUBJECTIVE AND OBJECTIVE BOX
CC: S/p L hip hemiarthroplasty     Today's Subjective & Objective Findings:  Patient seen and examined at bedside this AM- daughter at bedside.   Reports poor sleep due to Prevena VAC making noise, may increase melatonin to 9mg if there are no other overnight events. Will ask Othro to assess/troubleshoot with VAC noise.   Overall feeling well but not eating and drinking adequate amounts as he did not want to get up to urinate or have BM- discussed importance of diet and hydration to optimize his care while at [facility].   Temporary L AFO in use, await custom AFO.     Denies headache, dizziness, visual changes, chest pain, SOB/ROQUE, abdominal pain, nausea, vomiting, diarrhea, dysuria, numbness or tingling.  LB 4/16    Therapy  Ambulated 50, 100ft with RW min A +left LE AFO donned with improved clearance of  left LEs noted.  Patient with slight increased internal rotation of left LE, provided with verbal  cue for correction. Patient step to pattern, educated patient on eventual  progression to step through.    Vital Signs Last 24 Hrs  T(C): 36.6 (16 Apr 2025 20:17), Max: 36.6 (16 Apr 2025 20:17)  T(F): 97.9 (16 Apr 2025 20:17), Max: 97.9 (16 Apr 2025 20:17)  HR: 67 (17 Apr 2025 05:42) (67 - 70)  BP: 151/67 (17 Apr 2025 05:42) (138/56 - 151/67)  BP(mean): --  RR: 16 (17 Apr 2025 05:42) (16 - 16)  SpO2: 94% (17 Apr 2025 05:42) (94% - 97%)    Parameters below as of 17 Apr 2025 05:42  Patient On (Oxygen Delivery Method): room air    PHYSICAL EXAM  Constitutional - No acute distress, Comfortable  Neck - Supple  Cardiovascular - Palpable peripheral pulses   Respiratory/Chest- Symmetrical chest expansion, No dyspnea  Abdomen - Soft, Non-tender  Extremities - No cyanosis, No edema,   Neurologic Exam - Alert, Oriented, Interactive  Sensory - Light touch sensation intact  Motor Function - Moves all extremities spontaneously  Skin -  left hip incision covered with Aquacel (saturated on admission,), left hip ecchymosis, no active bleed       LABS:                      10.4   8.06  )-----------( 321      ( 17 Apr 2025 05:48 )             31.1     04-17    138  |  103  |  17  ----------------------------<  122[H]  4.1   |  29  |  1.05    Ca    8.6      17 Apr 2025 05:48    TPro  5.9[L]  /  Alb  2.4[L]  /  TBili  0.9  /  DBili  x   /  AST  28  /  ALT  23  /  AlkPhos  103  04-17    LIVER FUNCTIONS - ( 17 Apr 2025 05:48 )  Alb: 2.4 g/dL / Pro: 5.9 g/dL / ALK PHOS: 103 U/L / ALT: 23 U/L / AST: 28 U/L / GGT: x           Urinalysis Basic - ( 17 Apr 2025 05:48 )    Color: x / Appearance: x / SG: x / pH: x  Gluc: 122 mg/dL / Ketone: x  / Bili: x / Urobili: x   Blood: x / Protein: x / Nitrite: x   Leuk Esterase: x / RBC: x / WBC x   Sq Epi: x / Non Sq Epi: x / Bacteria: x    CAPILLARY BLOOD GLUCOSE    MEDICATIONS  (STANDING):  acetaminophen     Tablet .. 975 milliGRAM(s) Oral every 8 hours  albuterol    0.083% 2.5 milliGRAM(s) Nebulizer every 8 hours  buDESOnide    Inhalation Suspension 0.5 milliGRAM(s) Inhalation two times a day  heparin   Injectable 5000 Unit(s) SubCutaneous every 8 hours  melatonin 6 milliGRAM(s) Oral at bedtime  pantoprazole    Tablet 40 milliGRAM(s) Oral before breakfast  polyethylene glycol 3350 17 Gram(s) Oral two times a day  senna 2 Tablet(s) Oral at bedtime  valsartan 40 milliGRAM(s) Oral daily    MEDICATIONS  (PRN):  albuterol    90 MICROgram(s) HFA Inhaler 2 Puff(s) Inhalation every 6 hours PRN Shortness of Breath  oxyCODONE    IR 5 milliGRAM(s) Oral every 4 hours PRN Severe Pain (7 - 10)  oxyCODONE    IR 2.5 milliGRAM(s) Oral every 4 hours PRN Moderate Pain (4 - 6)   CC: S/p L hip hemiarthroplasty     Today's Subjective & Objective Findings:  Patient seen and examined at bedside this AM- daughter at bedside.   Reports poor sleep due to Prevena VAC making noise, may increase melatonin to 9mg if there are no other overnight events. Will ask Othro to assess/troubleshoot with VAC noise.   Overall feeling well but not eating and drinking adequate amounts as he did not want to get up to urinate or have BM- discussed importance of diet and hydration to optimize his care while at IRF.   Temporary L AFO in use, await custom AFO.   Reports reduced oral fluid and food intake, to reduce frequency of BM and urine void  Counseled against this  Encouraged to improve intake    Denies headache, dizziness, visual changes, chest pain, SOB/ROQUE, abdominal pain, nausea, vomiting, diarrhea, dysuria, numbness or tingling.  [facility] 4/16    Therapy  Ambulated 50 x 2 ft with RW min A +left LE AFO donned. Patient required verbal  cues and assistance for RW placement with cues for progressing to step through  pattern. Patient with difficulty and often presenting with step to 2/2  discomfort and decreased left weight bearing tolerance/muscle guarding.  Patient with effort demonstrated. Patient continues with slight internal  rotation of left LE - discussed hip precautions, able to self correct left LE  steps with verbal cues.      Vital Signs Last 24 Hrs  T(C): 36.6 (16 Apr 2025 20:17), Max: 36.6 (16 Apr 2025 20:17)  T(F): 97.9 (16 Apr 2025 20:17), Max: 97.9 (16 Apr 2025 20:17)  HR: 67 (17 Apr 2025 05:42) (67 - 70)  BP: 151/67 (17 Apr 2025 05:42) (138/56 - 151/67)  RR: 16 (17 Apr 2025 05:42) (16 - 16)  SpO2: 94% (17 Apr 2025 05:42) (94% - 97%)  Parameters below as of 17 Apr 2025 05:42  Patient On (Oxygen Delivery Method): room air    PHYSICAL EXAM  Constitutional - No acute distress, Comfortable  Neck - Supple  Cardiovascular - Palpable peripheral pulses   Respiratory/Chest- Symmetrical chest expansion, No dyspnea  Abdomen - Soft, Non-tender  Extremities - No cyanosis, No edema,     Neurologic Exam - Alert, Oriented, Interactive  Sensory - Light touch sensation intact  Motor Function - Moves all extremities spontaneously  Skin -  left hip incision covered with Aquacel (saturated on admission,), left hip ecchymosis, no active bleed       LABS:                      10.4   8.06  )-----------( 321      ( 17 Apr 2025 05:48 )             31.1     04-17    138  |  103  |  17  ----------------------------<  122[H]  4.1   |  29  |  1.05    Ca    8.6      17 Apr 2025 05:48    TPro  5.9[L]  /  Alb  2.4[L]  /  TBili  0.9  /  DBili  x   /  AST  28  /  ALT  23  /  AlkPhos  103  04-17    LIVER FUNCTIONS - ( 17 Apr 2025 05:48 )  Alb: 2.4 g/dL / Pro: 5.9 g/dL / ALK PHOS: 103 U/L / ALT: 23 U/L / AST: 28 U/L / GGT: x           Urinalysis Basic - ( 17 Apr 2025 05:48 )    Color: x / Appearance: x / SG: x / pH: x  Gluc: 122 mg/dL / Ketone: x  / Bili: x / Urobili: x   Blood: x / Protein: x / Nitrite: x   Leuk Esterase: x / RBC: x / WBC x   Sq Epi: x / Non Sq Epi: x / Bacteria: x    CAPILLARY BLOOD GLUCOSE    MEDICATIONS  (STANDING):  acetaminophen     Tablet .. 975 milliGRAM(s) Oral every 8 hours  albuterol    0.083% 2.5 milliGRAM(s) Nebulizer every 8 hours  buDESOnide    Inhalation Suspension 0.5 milliGRAM(s) Inhalation two times a day  heparin   Injectable 5000 Unit(s) SubCutaneous every 8 hours  melatonin 6 milliGRAM(s) Oral at bedtime  pantoprazole    Tablet 40 milliGRAM(s) Oral before breakfast  polyethylene glycol 3350 17 Gram(s) Oral two times a day  senna 2 Tablet(s) Oral at bedtime  valsartan 40 milliGRAM(s) Oral daily    MEDICATIONS  (PRN):  albuterol    90 MICROgram(s) HFA Inhaler 2 Puff(s) Inhalation every 6 hours PRN Shortness of Breath  oxyCODONE    IR 5 milliGRAM(s) Oral every 4 hours PRN Severe Pain (7 - 10)  oxyCODONE    IR 2.5 milliGRAM(s) Oral every 4 hours PRN Moderate Pain (4 - 6)

## 2025-04-17 NOTE — PROGRESS NOTE ADULT - ASSESSMENT
79M with pMHx of Asthma, HTN, GERD, chronic left foot drop,  h/o asbestosis exposure but no history of COPD- presented to the  ED accompanied by his wife s/p fall in the driveway while using a leaf blower on 4/10. X-ray of hip/femur with Left femoral Neck fracture- s/p Left hemiarthroplasty on 4/11. Not on anticoagulation. Patient with Gait Instability, ADL impairments and Functional impairments.    * Left foot drop - temporary AFO in use - await custom AFO, c/w current temporary AFO  * Hx of cognitive deficits and Left knee pain despite previous replacement  * Improvement with ambulatory distance and breathing  * Will reduce heparin to bid with improved ambulatory distance > 100ft       #  Left femoral Neck fracture- s/p Left hemiarthroplasty on 4/11.  Gait Instability, ADL impairments and Functional impairments  - [facility]  - Posterior hip precautions    #Chronic Left Foot Drop  -Ankle Foot Othosis, carbon fiber: Daily wear for Foot Drop when OOB  - c/w current temporary AFO from orthotist, while awaiting definitive AFO    #Pain control  - Acetaminophen 975 mg q8 hours  -oxyCODONE 5mg PRN q4 hours As needed Severe Pain (7 - 10)    #Asthma/#Asbestosis exposure  -Advair Diskus 250 mcg-50 mcg 1 puff inhaled BID  -Albuterol CFC free 90 mcg/inh inhalation aerosol: 2 puff(s) inhaled every 4 hours, PRN For SOB  -Arformoterol 15 mcg/2 mL inhalation solution:   -Budesonide 0.5 mg/2 mL inhalation suspension: inhaled once a day    #HTN  -Valsartan 40mg daily    #GI/Bowel Mgmt/#GERD  - Continue Senna at bedtime   - Miralax   - Polyethylene glycol BID  - Protonix 40 mg     #Bladder management--voiding     #DVT prophylaxis   - Heparin TID    #Skin:  - Left hip incision covered with Aquacel (saturated on admission) --> surgery to place Pravena VAC    FEN   - Diet - DASH Diet    Precautions / PROPHYLAXIS:   - Falls,  - Ortho: Weight bearing status: WBAT   - Lungs: Aspiration  - Pressure injury/Skin: OOB to Chair, PT/OT    -------------------  liaison with family/providers  4/15--Collateral hx obtained from daughter--hx of cognitive deficits, chronic left knee pain and reduced ROM despite previous replacement, patient declined f/u to address it  ----------------------- 79M with pMHx of Asthma, HTN, GERD, chronic left foot drop,  h/o asbestosis exposure but no history of COPD- presented to the  ED accompanied by his wife s/p fall in the driveway while using a leaf blower on 4/10. X-ray of hip/femur with Left femoral Neck fracture- s/p Left hemiarthroplasty on 4/11. Not on anticoagulation. Patient with Gait Instability, ADL impairments and Functional impairments.    * Left foot drop - temporary AFO in use - await custom AFO, c/w current temporary AFO  * Hx of cognitive deficits --referred to SLP for cognitve assessment  * Improvement with ambulatory distance and breathing   DVT ppx--reduce heparin to bid  F/u surgical recs for Left hip preveena dressing     #  Left femoral Neck fracture- s/p Left hemiarthroplasty on 4/11.  Gait Instability, ADL impairments and Functional impairments  - [facility]  - Posterior hip precautions    #Chronic Left Foot Drop  -Ankle Foot Othosis, carbon fiber: Daily wear for Foot Drop when OOB  - c/w current temporary AFO from orthotist, while awaiting definitive AFO    #Pain control  - Acetaminophen 975 mg q8 hours  -oxyCODONE 5mg PRN q4 hours As needed Severe Pain (7 - 10)    #Asthma/#Asbestosis exposure  -Advair Diskus 250 mcg-50 mcg 1 puff inhaled BID  -Albuterol CFC free 90 mcg/inh inhalation aerosol: 2 puff(s) inhaled every 4 hours, PRN For SOB  -Arformoterol 15 mcg/2 mL inhalation solution:   -Budesonide 0.5 mg/2 mL inhalation suspension: inhaled once a day    #HTN  -Valsartan 40mg daily    #GI/Bowel Mgmt/#GERD  - Continue Senna at bedtime   - Miralax   - Polyethylene glycol BID  - Protonix 40 mg     #Bladder management--voiding     #DVT prophylaxis   - Heparin bid    #Skin:  - Left hip incision covered with Aquacel (saturated on admission) --> surgery to place Pravena VAC    FEN   - Diet - DASH Diet    Precautions / PROPHYLAXIS:   - Falls,  - Ortho: Weight bearing status: WBAT   - Lungs: Aspiration  - Pressure injury/Skin: OOB to Chair, PT/OT      HEALTH MAINTEANCE  * Hx of cognitive deficits --referred to SLP for cognitve assessment  Falls prevention   -------------------  liaison with family/providers  4/17-Daughter at bedside, participated during therapy, update on treatment discussed   -----------------------    IDT 4/17  Ambulating 100ft with walker, min assist  Left foot drop--pre fabricated AFO left leg, awaiting customized AFO  Est dc 4/29    F/U     Orthopedics  PCP

## 2025-04-17 NOTE — PROGRESS NOTE ADULT - SUBJECTIVE AND OBJECTIVE BOX
PROGRESS NOTE:     Patient is a 79y old  Male who presents with a chief complaint of Left femoral neck fracture s/p left hemiarthroplasty (17 Apr 2025 09:22)      SUBJECTIVE / OVERNIGHT EVENTS: pt was seen and evaluated today  no complains offered      ADDITIONAL REVIEW OF SYSTEMS: as per HPI    MEDICATIONS  (STANDING):  acetaminophen     Tablet .. 975 milliGRAM(s) Oral every 8 hours  albuterol    0.083% 2.5 milliGRAM(s) Nebulizer every 8 hours  buDESOnide    Inhalation Suspension 0.5 milliGRAM(s) Inhalation two times a day  heparin   Injectable 5000 Unit(s) SubCutaneous every 8 hours  melatonin 9 milliGRAM(s) Oral at bedtime  pantoprazole    Tablet 40 milliGRAM(s) Oral before breakfast  polyethylene glycol 3350 17 Gram(s) Oral two times a day  senna 2 Tablet(s) Oral at bedtime  valsartan 40 milliGRAM(s) Oral daily    MEDICATIONS  (PRN):  albuterol    90 MICROgram(s) HFA Inhaler 2 Puff(s) Inhalation every 6 hours PRN Shortness of Breath  oxyCODONE    IR 5 milliGRAM(s) Oral every 4 hours PRN Severe Pain (7 - 10)  oxyCODONE    IR 2.5 milliGRAM(s) Oral every 4 hours PRN Moderate Pain (4 - 6)      CAPILLARY BLOOD GLUCOSE        I&O's Summary      PHYSICAL EXAM:  Vital Signs Last 24 Hrs  T(C): 36.6 (16 Apr 2025 20:17), Max: 36.6 (16 Apr 2025 20:17)  T(F): 97.9 (16 Apr 2025 20:17), Max: 97.9 (16 Apr 2025 20:17)  HR: 67 (17 Apr 2025 05:42) (67 - 70)  BP: 151/67 (17 Apr 2025 05:42) (138/56 - 151/67)  BP(mean): --  RR: 16 (17 Apr 2025 05:42) (16 - 16)  SpO2: 94% (17 Apr 2025 05:42) (94% - 97%)    Parameters below as of 17 Apr 2025 05:42  Patient On (Oxygen Delivery Method): room air    Gen - NAD, Comfortable  HEENT - NCAT, EOMI  Neck - Supple  Pulm - CTAB, No wheeze, No rhonchi, No crackles  Cardiovascular - RRR, S1S2  Abdomen - Soft, NT/ND  Extremities - No C/C/E, No calf tenderness  Neuro- AAOx3, follows commands  Psychiatric - Mood stable, Affect WNL        LABS:                        10.4   8.06  )-----------( 321      ( 17 Apr 2025 05:48 )             31.1     04-17    138  |  103  |  17  ----------------------------<  122[H]  4.1   |  29  |  1.05    Ca    8.6      17 Apr 2025 05:48    TPro  5.9[L]  /  Alb  2.4[L]  /  TBili  0.9  /  DBili  x   /  AST  28  /  ALT  23  /  AlkPhos  103  04-17          Urinalysis Basic - ( 17 Apr 2025 05:48 )    Color: x / Appearance: x / SG: x / pH: x  Gluc: 122 mg/dL / Ketone: x  / Bili: x / Urobili: x   Blood: x / Protein: x / Nitrite: x   Leuk Esterase: x / RBC: x / WBC x   Sq Epi: x / Non Sq Epi: x / Bacteria: x          RADIOLOGY & ADDITIONAL TESTS:  Results Reviewed:   Imaging Personally Reviewed:  Electrocardiogram Personally Reviewed:    COORDINATION OF CARE:  Care Discussed with Consultants/Other Providers [Y/N]:  Prior or Outpatient Records Reviewed [Y/N]:

## 2025-04-18 PROCEDURE — 99232 SBSQ HOSP IP/OBS MODERATE 35: CPT

## 2025-04-18 RX ORDER — HEPARIN SODIUM 1000 [USP'U]/ML
5000 INJECTION INTRAVENOUS; SUBCUTANEOUS EVERY 12 HOURS
Refills: 0 | Status: DISCONTINUED | OUTPATIENT
Start: 2025-04-18 | End: 2025-04-29

## 2025-04-18 RX ORDER — OXYCODONE HYDROCHLORIDE 30 MG/1
5 TABLET ORAL
Refills: 0 | Status: DISCONTINUED | OUTPATIENT
Start: 2025-04-19 | End: 2025-04-25

## 2025-04-18 RX ORDER — OXYCODONE HYDROCHLORIDE 30 MG/1
2.5 TABLET ORAL EVERY 4 HOURS
Refills: 0 | Status: DISCONTINUED | OUTPATIENT
Start: 2025-04-18 | End: 2025-04-25

## 2025-04-18 RX ORDER — ACETAMINOPHEN 500 MG/5ML
975 LIQUID (ML) ORAL EVERY 8 HOURS
Refills: 0 | Status: DISCONTINUED | OUTPATIENT
Start: 2025-04-18 | End: 2025-04-25

## 2025-04-18 RX ORDER — LACTOBACILLUS ACIDOPHILUS/PECT 75 MM-100
1 CAPSULE ORAL
Refills: 0 | Status: DISCONTINUED | OUTPATIENT
Start: 2025-04-18 | End: 2025-04-29

## 2025-04-18 RX ORDER — OXYCODONE HYDROCHLORIDE 30 MG/1
5 TABLET ORAL EVERY 4 HOURS
Refills: 0 | Status: DISCONTINUED | OUTPATIENT
Start: 2025-04-18 | End: 2025-04-25

## 2025-04-18 RX ADMIN — BUDESONIDE 0.5 MILLIGRAM(S): 0.25 SUSPENSION RESPIRATORY (INHALATION) at 22:04

## 2025-04-18 RX ADMIN — HEPARIN SODIUM 5000 UNIT(S): 1000 INJECTION INTRAVENOUS; SUBCUTANEOUS at 05:09

## 2025-04-18 RX ADMIN — POLYETHYLENE GLYCOL 3350 17 GRAM(S): 17 POWDER, FOR SOLUTION ORAL at 17:14

## 2025-04-18 RX ADMIN — Medication 1 TABLET(S): at 17:14

## 2025-04-18 RX ADMIN — Medication 975 MILLIGRAM(S): at 08:39

## 2025-04-18 RX ADMIN — HEPARIN SODIUM 5000 UNIT(S): 1000 INJECTION INTRAVENOUS; SUBCUTANEOUS at 17:14

## 2025-04-18 RX ADMIN — Medication 40 MILLIGRAM(S): at 05:09

## 2025-04-18 RX ADMIN — Medication 975 MILLIGRAM(S): at 09:24

## 2025-04-18 RX ADMIN — Medication 2.5 MILLIGRAM(S): at 16:02

## 2025-04-18 RX ADMIN — Medication 975 MILLIGRAM(S): at 21:39

## 2025-04-18 RX ADMIN — Medication 9 MILLIGRAM(S): at 21:09

## 2025-04-18 RX ADMIN — Medication 975 MILLIGRAM(S): at 21:09

## 2025-04-18 NOTE — PROGRESS NOTE ADULT - ASSESSMENT
78yo male w/ hx of Asthma, HTN, presented to the ED accompanied by his wife s/p fall in the driveway while using a leaf blower to clean the driveway, noted on imaging to have Left femoral Neck fracture    #Fall  #Left Femoral neck fracture  -s/p L hip hemiarthroplasty 4/11  -Pain control as per rehab team  -management per ortho; WBAT  -PT/OT/PMR eval  -chronic foot drop - per ortho- carbon fiber AFO. Seen by Fab Holder and measurement taken    #HTN  -Valsartan  -monitor BP    #Asthma  #Asbestosis exposure  -Stable on RA  -CXR results noted; Clear lungs  -Continue Inhalers    #VTE ppx  -HSQ per ortho      4/17 labs reviewed

## 2025-04-18 NOTE — PROGRESS NOTE ADULT - SUBJECTIVE AND OBJECTIVE BOX
CC: Patient is a 79y old  Male who presents with a chief complaint of Left femoral neck fracture s/p left hemiarthroplasty (18 Apr 2025 12:37)    Interval History:    Patient seen and examined at bedside.  No overnight events.  No new complaints.    ALLERGIES:  No Known Allergies    MEDICATIONS  (STANDING):  acetaminophen     Tablet .. 975 milliGRAM(s) Oral every 8 hours  albuterol    0.083% 2.5 milliGRAM(s) Nebulizer every 8 hours  buDESOnide    Inhalation Suspension 0.5 milliGRAM(s) Inhalation two times a day  heparin   Injectable 5000 Unit(s) SubCutaneous every 12 hours  lactobacillus acidophilus 1 Tablet(s) Oral two times a day with meals  melatonin 9 milliGRAM(s) Oral at bedtime  pantoprazole    Tablet 40 milliGRAM(s) Oral before breakfast  polyethylene glycol 3350 17 Gram(s) Oral two times a day  senna 2 Tablet(s) Oral at bedtime  valsartan 40 milliGRAM(s) Oral daily    MEDICATIONS  (PRN):  albuterol    90 MICROgram(s) HFA Inhaler 2 Puff(s) Inhalation every 6 hours PRN Shortness of Breath  oxyCODONE    IR 5 milliGRAM(s) Oral every 4 hours PRN Severe Pain (7 - 10)  oxyCODONE    IR 2.5 milliGRAM(s) Oral every 4 hours PRN Moderate Pain (4 - 6)    Vital Signs Last 24 Hrs  T(F): 98.4 (18 Apr 2025 07:46), Max: 98.6 (17 Apr 2025 21:37)  HR: 73 (18 Apr 2025 07:46) (73 - 85)  BP: 151/69 (18 Apr 2025 07:46) (151/69 - 160/63)  RR: 16 (18 Apr 2025 07:46) (16 - 16)  SpO2: 96% (18 Apr 2025 07:46) (94% - 96%)  I&O's Summary    17 Apr 2025 07:01  -  18 Apr 2025 07:00  --------------------------------------------------------  IN: 0 mL / OUT: 600 mL / NET: -600 mL      BMI (kg/m2): 29.3 (04-14-25 @ 15:51)    PHYSICAL EXAM:  GENERAL: NAD  CHEST/LUNG: No rales, rhonchi, wheezing; normal respiratory effort  HEART: RRR; No murmurs, rubs, or gallops  ABDOMEN: Soft, Nontender, Nondistended; Bowel sounds present  MSK/EXT:  No peripheral edema, 2+ Peripheral Pulses, No clubbing or digital cyanosis  PSYCH: Appropriate affect, Alert & Oriented    LABS:                        10.4   8.06  )-----------( 321      ( 17 Apr 2025 05:48 )             31.1       04-17    138  |  103  |  17  ----------------------------<  122  4.1   |  29  |  1.05    Ca    8.6      17 Apr 2025 05:48    TPro  5.9  /  Alb  2.4  /  TBili  0.9  /  DBili  x   /  AST  28  /  ALT  23  /  AlkPhos  103  04-17       Urinalysis Basic - ( 17 Apr 2025 05:48 )    Color: x / Appearance: x / SG: x / pH: x  Gluc: 122 mg/dL / Ketone: x  / Bili: x / Urobili: x   Blood: x / Protein: x / Nitrite: x   Leuk Esterase: x / RBC: x / WBC x   Sq Epi: x / Non Sq Epi: x / Bacteria: x      COVID-19 PCR: NotDetec (04-14-25 @ 17:45)      Care Discussed with Consultants/Other Providers: Yes

## 2025-04-18 NOTE — PROGRESS NOTE ADULT - SUBJECTIVE AND OBJECTIVE BOX
CC: S/p L hip hemiarthroplasty     Today's Subjective & Objective Findings:  Patient seen and examined at bedside this AM.   Admits to improved sleep.  Prevena dressing removed by surgical PA yesterday, may be replaced.   Temporary L AFO in use, await custom AFO.   Reports reduced oral fluid and food intake, to reduce frequency of BM and urine void. Counseled against this. Encouraged to improve intake.  Await SLP eval for cog deficits.     Denies headache, dizziness, visual changes, chest pain, SOB/ROQUE, abdominal pain, nausea, vomiting, diarrhea, dysuria, numbness or tingling.  [facility] 4/18    Therapy  Ambulated 50 x 2 ft with RW min A +left LE AFO donned. Patient required verbal  cues and assistance for RW placement with cues for progressing to step through  pattern. Patient with difficulty and often presenting with step to 2/2  discomfort and decreased left weight bearing tolerance/muscle guarding.  Patient with effort demonstrated. Patient continues with slight internal  rotation of left LE - discussed hip precautions, able to self correct left LE  steps with verbal cues.    Vital Signs Last 24 Hrs  T(C): 36.9 (18 Apr 2025 07:46), Max: 37 (17 Apr 2025 21:37)  T(F): 98.4 (18 Apr 2025 07:46), Max: 98.6 (17 Apr 2025 21:37)  HR: 73 (18 Apr 2025 07:46) (73 - 85)  BP: 151/69 (18 Apr 2025 07:46) (151/69 - 160/63)  BP(mean): --  RR: 16 (18 Apr 2025 07:46) (16 - 16)  SpO2: 96% (18 Apr 2025 07:46) (94% - 96%)      PHYSICAL EXAM  Constitutional - No acute distress, Comfortable  Neck - Supple  Cardiovascular - Palpable peripheral pulses   Respiratory/Chest- Symmetrical chest expansion, No dyspnea  Abdomen - Soft, Non-tender  Extremities - No cyanosis, No edema,     Neurologic Exam - Alert, Oriented, Interactive  Sensory - Light touch sensation intact  Motor Function - Moves all extremities spontaneously  Skin -  left hip incision covered with Aquacel (saturated on admission,), left hip ecchymosis, no active bleed       LABS:                      10.4   8.06  )-----------( 321      ( 17 Apr 2025 05:48 )             31.1     04-17    138  |  103  |  17  ----------------------------<  122[H]  4.1   |  29  |  1.05    Ca    8.6      17 Apr 2025 05:48    TPro  5.9[L]  /  Alb  2.4[L]  /  TBili  0.9  /  DBili  x   /  AST  28  /  ALT  23  /  AlkPhos  103  04-17    LIVER FUNCTIONS - ( 17 Apr 2025 05:48 )  Alb: 2.4 g/dL / Pro: 5.9 g/dL / ALK PHOS: 103 U/L / ALT: 23 U/L / AST: 28 U/L / GGT: x           Urinalysis Basic - ( 17 Apr 2025 05:48 )    Color: x / Appearance: x / SG: x / pH: x  Gluc: 122 mg/dL / Ketone: x  / Bili: x / Urobili: x   Blood: x / Protein: x / Nitrite: x   Leuk Esterase: x / RBC: x / WBC x   Sq Epi: x / Non Sq Epi: x / Bacteria: x    CAPILLARY BLOOD GLUCOSE      MEDICATIONS  (STANDING):  acetaminophen     Tablet .. 975 milliGRAM(s) Oral every 8 hours  albuterol    0.083% 2.5 milliGRAM(s) Nebulizer every 8 hours  buDESOnide    Inhalation Suspension 0.5 milliGRAM(s) Inhalation two times a day  heparin   Injectable 5000 Unit(s) SubCutaneous every 8 hours  lactobacillus acidophilus 1 Tablet(s) Oral two times a day with meals  melatonin 9 milliGRAM(s) Oral at bedtime  pantoprazole    Tablet 40 milliGRAM(s) Oral before breakfast  polyethylene glycol 3350 17 Gram(s) Oral two times a day  senna 2 Tablet(s) Oral at bedtime  valsartan 40 milliGRAM(s) Oral daily    MEDICATIONS  (PRN):  albuterol    90 MICROgram(s) HFA Inhaler 2 Puff(s) Inhalation every 6 hours PRN Shortness of Breath  oxyCODONE    IR 5 milliGRAM(s) Oral every 4 hours PRN Severe Pain (7 - 10)  oxyCODONE    IR 2.5 milliGRAM(s) Oral every 4 hours PRN Moderate Pain (4 - 6)   CC: S/p L hip hemiarthroplasty     Today's Subjective & Objective Findings:  Patient seen and examined at bedside this AM.   Admits to improved sleep.  Prevena dressing removed by surgical PA yesterday.  Temporary L AFO in use, await custom AFO.   Reports reduced oral fluid and food intake, to reduce frequency of BM and urine void. Counseled against this. Encouraged to improve intake.  Await SLP eval for cog deficits.     Denies headache, dizziness, visual changes, chest pain, SOB/ROQUE, abdominal pain, nausea, vomiting, diarrhea, dysuria, numbness or tingling.  [facility] 4/18    Therapy  Ambulated 50 x 2 ft with RW min A +left LE AFO donned. Patient required verbal  cues and assistance for RW placement with cues for progressing to step through  pattern. Patient with difficulty and often presenting with step to 2/2  discomfort and decreased left weight bearing tolerance/muscle guarding.  Patient with effort demonstrated. Patient continues with slight internal  rotation of left LE - discussed hip precautions, able to self correct left LE  steps with verbal cues.    Vital Signs Last 24 Hrs  T(C): 36.9 (18 Apr 2025 07:46), Max: 37 (17 Apr 2025 21:37)  T(F): 98.4 (18 Apr 2025 07:46), Max: 98.6 (17 Apr 2025 21:37)  HR: 73 (18 Apr 2025 07:46) (73 - 85)  BP: 151/69 (18 Apr 2025 07:46) (151/69 - 160/63)  BP(mean): --  RR: 16 (18 Apr 2025 07:46) (16 - 16)  SpO2: 96% (18 Apr 2025 07:46) (94% - 96%)      PHYSICAL EXAM  Constitutional - No acute distress, Comfortable  Neck - Supple  Cardiovascular - Palpable peripheral pulses   Respiratory/Chest- Symmetrical chest expansion, No dyspnea  Abdomen - Soft, Non-tender  Extremities - No cyanosis, No edema,     Neurologic Exam - Alert, Oriented, Interactive  Sensory - Light touch sensation intact  Motor Function - Moves all extremities spontaneously  Skin -  left hip incision covered with Aquacel (saturated on admission,), left hip ecchymosis, no active bleed       LABS:                      10.4   8.06  )-----------( 321      ( 17 Apr 2025 05:48 )             31.1     04-17    138  |  103  |  17  ----------------------------<  122[H]  4.1   |  29  |  1.05    Ca    8.6      17 Apr 2025 05:48    TPro  5.9[L]  /  Alb  2.4[L]  /  TBili  0.9  /  DBili  x   /  AST  28  /  ALT  23  /  AlkPhos  103  04-17    LIVER FUNCTIONS - ( 17 Apr 2025 05:48 )  Alb: 2.4 g/dL / Pro: 5.9 g/dL / ALK PHOS: 103 U/L / ALT: 23 U/L / AST: 28 U/L / GGT: x           Urinalysis Basic - ( 17 Apr 2025 05:48 )    Color: x / Appearance: x / SG: x / pH: x  Gluc: 122 mg/dL / Ketone: x  / Bili: x / Urobili: x   Blood: x / Protein: x / Nitrite: x   Leuk Esterase: x / RBC: x / WBC x   Sq Epi: x / Non Sq Epi: x / Bacteria: x    CAPILLARY BLOOD GLUCOSE      MEDICATIONS  (STANDING):  acetaminophen     Tablet .. 975 milliGRAM(s) Oral every 8 hours  albuterol    0.083% 2.5 milliGRAM(s) Nebulizer every 8 hours  buDESOnide    Inhalation Suspension 0.5 milliGRAM(s) Inhalation two times a day  heparin   Injectable 5000 Unit(s) SubCutaneous every 8 hours  lactobacillus acidophilus 1 Tablet(s) Oral two times a day with meals  melatonin 9 milliGRAM(s) Oral at bedtime  pantoprazole    Tablet 40 milliGRAM(s) Oral before breakfast  polyethylene glycol 3350 17 Gram(s) Oral two times a day  senna 2 Tablet(s) Oral at bedtime  valsartan 40 milliGRAM(s) Oral daily    MEDICATIONS  (PRN):  albuterol    90 MICROgram(s) HFA Inhaler 2 Puff(s) Inhalation every 6 hours PRN Shortness of Breath  oxyCODONE    IR 5 milliGRAM(s) Oral every 4 hours PRN Severe Pain (7 - 10)  oxyCODONE    IR 2.5 milliGRAM(s) Oral every 4 hours PRN Moderate Pain (4 - 6)

## 2025-04-18 NOTE — CHART NOTE - NSCHARTNOTEFT_GEN_A_CORE
S/P left hip hemiarthroplasty  POD#7     Yesterday 4/17 called to see patient for full drainage cannister on the Prevena .  Discussed with Dr Casanova and removed Prevena vac.    Trial of dry dressings over the weekend, change dressing if soiled.    Today, Dressing clean, dry and intact.   Will continue to follow   Dr Casanova aware   .

## 2025-04-18 NOTE — PROGRESS NOTE ADULT - ASSESSMENT
79M with pMHx of Asthma, HTN, GERD, chronic left foot drop,  h/o asbestosis exposure but no history of COPD- presented to the  ED accompanied by his wife s/p fall in the driveway while using a leaf blower on 4/10. X-ray of hip/femur with Left femoral Neck fracture- s/p Left hemiarthroplasty on 4/11. Not on anticoagulation. Patient with Gait Instability, ADL impairments and Functional impairments.    * Left foot drop - temporary AFO in use - await custom AFO  * Await SLP for cognitve assessment  * Fall - await osteoporosis labs   * DVT ppx--reduce heparin to bid  * Surgery/Ortho following for Prevena dressing management - removed on 4/17 - may be replaced by Surg/ortho     #  Left femoral Neck fracture- s/p Left hemiarthroplasty on 4/11.  Gait Instability, ADL impairments and Functional impairments  - [facility] PT/OT  - Posterior hip precautions    #History of Cognitive Deficits  - Await SLP eval     #Chronic Left Foot Drop  -Ankle Foot Othosis, carbon fiber: Daily wear for Foot Drop when OOB  - c/w current temporary AFO from orthotist, while awaiting custom AFO    #Pain control  - Acetaminophen 975 mg q8 hours  -oxyCODONE 5mg PRN q4 hours As needed Severe Pain (7 - 10)    #Asthma/#Asbestosis exposure  -Advair Diskus 250 mcg-50 mcg 1 puff inhaled BID  -Albuterol CFC free 90 mcg/inh inhalation aerosol: 2 puff(s) inhaled every 4 hours, PRN For SOB  -Arformoterol 15 mcg/2 mL inhalation solution:   -Budesonide 0.5 mg/2 mL inhalation suspension: inhaled once a day    #HTN  -Valsartan 40mg daily    #GI/Bowel Mgmt/#GERD  - Continue Senna at bedtime   - Miralax   - Polyethylene glycol BID  - Protonix 40 mg     #Bladder management--voiding     #DVT prophylaxis   - Heparin BID     #Skin:  - Left hip incision covered with Aquacel (saturated on admission) --> Pravena VAC placed by surgery on 4/14, removed on 4/17 - may be replaced by Surg    FEN   - Diet - DASH Diet    Precautions / PROPHYLAXIS:   - Falls,  - Ortho: Weight bearing status: WBAT   - Lungs: Aspiration  - Pressure injury/Skin: OOB to Chair, PT/OT      HEALTH MAINTEANCE  Hx of cognitive deficits --referred to SLP for cognitve assessment  Falls prevention - await Osteoporosis labs   -------------------  liaison with family/providers    4/17-Daughter at bedside, participated during therapy, update on treatment discussed   -----------------------    IDT 4/17  Ambulating 100ft with walker, min assist  Left foot drop--pre fabricated AFO left leg, awaiting customized AFO  Est dc 4/29    F/U     Orthopedics  PCP 79M with pMHx of Asthma, HTN, GERD, chronic left foot drop,  h/o asbestosis exposure but no history of COPD- presented to the  ED accompanied by his wife s/p fall in the driveway while using a leaf blower on 4/10. X-ray of hip/femur with Left femoral Neck fracture- s/p Left hemiarthroplasty on 4/11. Not on anticoagulation. Patient with Gait Instability, ADL impairments and Functional impairments.    * Left foot drop - temporary AFO in use - await custom AFO  * Await SLP for cognitve assessment  * Fall - await osteoporosis labs   * DVT ppx--reduce heparin to bid  * Surgery/Ortho following for Prevena dressing management - removed on 4/17     #  Left femoral Neck fracture- s/p Left hemiarthroplasty on 4/11.  Gait Instability, ADL impairments and Functional impairments  - [facility] PT/OT  - Posterior hip precautions    #History of Cognitive Deficits  - Await SLP eval     #Chronic Left Foot Drop  -Ankle Foot Othosis, carbon fiber: Daily wear for Foot Drop when OOB  - c/w current temporary AFO from orthotist, while awaiting custom AFO    #Pain control  - Acetaminophen 975 mg q8 hours  -oxyCODONE 5mg PRN q4 hours As needed Severe Pain (7 - 10)    #Asthma/#Asbestosis exposure  -Advair Diskus 250 mcg-50 mcg 1 puff inhaled BID  -Albuterol CFC free 90 mcg/inh inhalation aerosol: 2 puff(s) inhaled every 4 hours, PRN For SOB  -Arformoterol 15 mcg/2 mL inhalation solution:   -Budesonide 0.5 mg/2 mL inhalation suspension: inhaled once a day    #HTN  -Valsartan 40mg daily    #GI/Bowel Mgmt/#GERD  - Continue Senna at bedtime   - Miralax   - Polyethylene glycol BID  - Protonix 40 mg     #Bladder management--voiding     #DVT prophylaxis   - Heparin BID     #Skin:  - Left hip incision covered with Aquacel (saturated on admission) --> Pravena VAC placed by surgery on 4/14, removed on 4/17     FEN   - Diet - DASH Diet    Precautions / PROPHYLAXIS:   - Falls,  - Ortho: Weight bearing status: WBAT   - Lungs: Aspiration  - Pressure injury/Skin: OOB to Chair, PT/OT      HEALTH MAINTEANCE  Hx of cognitive deficits --referred to SLP for cognitve assessment  Falls prevention - await Osteoporosis labs   -------------------  liaison with family/providers    4/17-Daughter at bedside, participated during therapy, update on treatment discussed   -----------------------    IDT 4/17  Ambulating 100ft with walker, min assist  Left foot drop--pre fabricated AFO left leg, awaiting customized AFO  Est dc 4/29    F/U     Orthopedics  PCP 79M with pMHx of Asthma, HTN, GERD, chronic left foot drop,  h/o asbestosis exposure but no history of COPD- presented to the  ED accompanied by his wife s/p fall in the driveway while using a leaf blower on 4/10. X-ray of hip/femur with Left femoral Neck fracture- s/p Left hemiarthroplasty on 4/11. Not on anticoagulation. Patient with Gait Instability, ADL impairments and Functional impairments.    * Left foot drop - temporary AFO in use - await custom AFO  * Await SLP for cognitve assessment  * Fall - await osteoporosis labs   * DVT ppx--reduce heparin to bid  * Surgery/Ortho following for Prevena dressing management - removed on 4/17     #  Left femoral Neck fracture- s/p Left hemiarthroplasty on 4/11.  Gait Instability, ADL impairments and Functional impairments  - [facility] PT/OT  - Posterior hip precautions    #History of Cognitive Deficits  - Await SLP eval     #Chronic Left Foot Drop  -Ankle Foot Othosis, carbon fiber: Daily wear for Foot Drop when OOB  - c/w current temporary AFO from orthotist, while awaiting custom AFO    #Pain control  - Acetaminophen 975 mg q8 hours  -oxyCODONE 5mg PRN q4 hours As needed Severe Pain (7 - 10)    #Asthma/#Asbestosis exposure  -Advair Diskus 250 mcg-50 mcg 1 puff inhaled BID  -Albuterol CFC free 90 mcg/inh inhalation aerosol: 2 puff(s) inhaled every 4 hours, PRN For SOB  -Arformoterol 15 mcg/2 mL inhalation solution:   -Budesonide 0.5 mg/2 mL inhalation suspension: inhaled once a day    #HTN  -Valsartan 40mg daily    #GI/Bowel Mgmt/#GERD  - Continue Senna at bedtime   - Miralax   - Polyethylene glycol BID  - Protonix 40 mg     #Bladder management--voiding     #DVT prophylaxis   - Heparin BID     #Skin:  - Left hip incision covered with Aquacel (saturated on admission) --> Pravena VAC placed by surgery on 4/14, removed on 4/17     FEN   - Diet - DASH Diet    Precautions / PROPHYLAXIS:   - Falls,  - Ortho: Weight bearing status: WBAT   - Lungs: Aspiration  - Pressure injury/Skin: OOB to Chair, PT/OT      HEALTH MAINTEANCE  Hx of cognitive deficits --referred to SLP for cognitve assessment  Falls prevention - await Osteoporosis labs   -------------------  liaison with family/providers    4/17-Daughter at bedside, participated during therapy, update on treatment discussed   4/18 Daughter reports that patient had neuro evaluation for memory deficits about 1yr prior, but there was no formal dx of overt cognitive dysfunction   -----------------------    IDT 4/17  Ambulating 100ft with walker, min assist  Left foot drop--pre fabricated AFO left leg, awaiting customized AFO  Est dc 4/29    F/U     Orthopedics  PCP

## 2025-04-18 NOTE — PROGRESS NOTE ADULT - NS ATTEND AMEND GEN_ALL_CORE FT
I have made amendments to the documentation where necessary. Additional comments: I have personally seen and examined the patient independently Medical records reviewed. I have made amendments to the documentation where necessary and adjusted the history, physical examination, and plan as documented by the NP.       Reports good night rest as confirmed by daughter   Still anxiously avoiding some meals due to fear of frequent urine void or BMs, counseled to continue to take his meals regularly    Observed engaging well in therapy   No acute distress  Breathing well on R.A    No bleeding or drainage from surgical site  Preveena dressing removed yesterday     Continue therapy   DVT ppx

## 2025-04-19 PROCEDURE — 99232 SBSQ HOSP IP/OBS MODERATE 35: CPT | Mod: GC

## 2025-04-19 PROCEDURE — 99232 SBSQ HOSP IP/OBS MODERATE 35: CPT

## 2025-04-19 RX ADMIN — Medication 40 MILLIGRAM(S): at 06:02

## 2025-04-19 RX ADMIN — HEPARIN SODIUM 5000 UNIT(S): 1000 INJECTION INTRAVENOUS; SUBCUTANEOUS at 17:06

## 2025-04-19 RX ADMIN — Medication 975 MILLIGRAM(S): at 06:32

## 2025-04-19 RX ADMIN — HEPARIN SODIUM 5000 UNIT(S): 1000 INJECTION INTRAVENOUS; SUBCUTANEOUS at 06:02

## 2025-04-19 RX ADMIN — Medication 9 MILLIGRAM(S): at 20:29

## 2025-04-19 RX ADMIN — OXYCODONE HYDROCHLORIDE 5 MILLIGRAM(S): 30 TABLET ORAL at 07:22

## 2025-04-19 RX ADMIN — POLYETHYLENE GLYCOL 3350 17 GRAM(S): 17 POWDER, FOR SOLUTION ORAL at 17:06

## 2025-04-19 RX ADMIN — POLYETHYLENE GLYCOL 3350 17 GRAM(S): 17 POWDER, FOR SOLUTION ORAL at 06:03

## 2025-04-19 RX ADMIN — BUDESONIDE 0.5 MILLIGRAM(S): 0.25 SUSPENSION RESPIRATORY (INHALATION) at 20:54

## 2025-04-19 RX ADMIN — Medication 975 MILLIGRAM(S): at 13:18

## 2025-04-19 RX ADMIN — Medication 2.5 MILLIGRAM(S): at 23:31

## 2025-04-19 RX ADMIN — OXYCODONE HYDROCHLORIDE 5 MILLIGRAM(S): 30 TABLET ORAL at 06:52

## 2025-04-19 RX ADMIN — BUDESONIDE 0.5 MILLIGRAM(S): 0.25 SUSPENSION RESPIRATORY (INHALATION) at 08:05

## 2025-04-19 RX ADMIN — Medication 975 MILLIGRAM(S): at 06:02

## 2025-04-19 RX ADMIN — Medication 2.5 MILLIGRAM(S): at 08:04

## 2025-04-19 RX ADMIN — Medication 1 TABLET(S): at 08:44

## 2025-04-19 RX ADMIN — Medication 975 MILLIGRAM(S): at 20:29

## 2025-04-19 RX ADMIN — Medication 975 MILLIGRAM(S): at 21:00

## 2025-04-19 RX ADMIN — Medication 1 TABLET(S): at 17:06

## 2025-04-19 NOTE — PROGRESS NOTE ADULT - SUBJECTIVE AND OBJECTIVE BOX
Patient is a 79y old  Male who presents with a chief complaint of Left femoral neck fracture s/p left hemiarthroplasty (19 Apr 2025 12:59)  pt POD # 8 s/p left hip hemiarthroplasty  Ortho following for serous drainage from incision  no events noted overnight, pt doing well in rehab  denies incisional pain     Patient seen and examined at bedside    ALLERGIES:  No Known Allergies    MEDICATIONS  (STANDING):  acetaminophen     Tablet .. 975 milliGRAM(s) Oral every 8 hours  albuterol    0.083% 2.5 milliGRAM(s) Nebulizer every 8 hours  buDESOnide    Inhalation Suspension 0.5 milliGRAM(s) Inhalation two times a day  heparin   Injectable 5000 Unit(s) SubCutaneous every 12 hours  lactobacillus acidophilus 1 Tablet(s) Oral two times a day with meals  melatonin 9 milliGRAM(s) Oral at bedtime  oxyCODONE    IR 5 milliGRAM(s) Oral <User Schedule>  pantoprazole    Tablet 40 milliGRAM(s) Oral before breakfast  polyethylene glycol 3350 17 Gram(s) Oral two times a day  senna 2 Tablet(s) Oral at bedtime  valsartan 40 milliGRAM(s) Oral daily    MEDICATIONS  (PRN):  albuterol    90 MICROgram(s) HFA Inhaler 2 Puff(s) Inhalation every 6 hours PRN Shortness of Breath  oxyCODONE    IR 5 milliGRAM(s) Oral every 4 hours PRN Severe Pain (7 - 10)  oxyCODONE    IR 2.5 milliGRAM(s) Oral every 4 hours PRN Moderate Pain (4 - 6)    Vital Signs Last 24 Hrs  T(F): 97.8 (19 Apr 2025 08:48), Max: 98.2 (18 Apr 2025 21:11)  HR: 69 (19 Apr 2025 08:48) (68 - 74)  BP: 139/69 (19 Apr 2025 08:48) (139/69 - 155/80)  RR: 16 (19 Apr 2025 08:48) (16 - 16)  SpO2: 96% (19 Apr 2025 08:48) (96% - 96%)    I&O's Summary    18 Apr 2025 07:01  -  19 Apr 2025 07:00  --------------------------------------------------------  IN: 0 mL / OUT: 150 mL / NET: -150 mL    PHYSICAL EXAM:  General: NAD, A/O x 3  ENT: MMM  Neck: Supple, No JVD  Abdomen: Soft, Nontender, Nondistended; Bowel sounds present  Extremities: left thigh is swollen, not tense, staples in place, ecchymosis noted, nttp, old dressing removed, abd mildly soiled with serous fluid, sensation intact, NVI, skin warm and dry     LABS:                        10.4   8.06  )-----------( 321      ( 17 Apr 2025 05:48 )             31.1     04-17    138  |  103  |  17  ----------------------------<  122  4.1   |  29  |  1.05    Ca    8.6      17 Apr 2025 05:48    TPro  5.9  /  Alb  2.4  /  TBili  0.9  /  DBili  x   /  AST  28  /  ALT  23  /  AlkPhos  103  04-17                                Urinalysis Basic - ( 17 Apr 2025 05:48 )    Color: x / Appearance: x / SG: x / pH: x  Gluc: 122 mg/dL / Ketone: x  / Bili: x / Urobili: x   Blood: x / Protein: x / Nitrite: x   Leuk Esterase: x / RBC: x / WBC x   Sq Epi: x / Non Sq Epi: x / Bacteria: x        COVID-19 PCR: NotDetec (04-14-25 @ 17:45)      RADIOLOGY & ADDITIONAL TESTS:    Care Discussed with Consultants/Other Providers:

## 2025-04-19 NOTE — PROGRESS NOTE ADULT - ASSESSMENT
79M with pMHx of Asthma, HTN, GERD, chronic left foot drop,  h/o asbestosis exposure but no history of COPD- presented to the  ED accompanied by his wife s/p fall in the driveway while using a leaf blower on 4/10. X-ray of hip/femur with Left femoral Neck fracture- s/p Left hemiarthroplasty on 4/11. Not on anticoagulation. Patient with Gait Instability, ADL impairments and Functional impairments.    * Left foot drop - temporary AFO in use - await custom AFO  * Await SLP for cognitve assessment  * Fall - await osteoporosis labs   * DVT ppx--reduce heparin to bid  * Surgery/Ortho following for Prevena dressing management - removed on 4/17     #  Left femoral Neck fracture- s/p Left hemiarthroplasty on 4/11.  Gait Instability, ADL impairments and Functional impairments  - [facility] PT/OT  - Posterior hip precautions    #History of Cognitive Deficits  - Await SLP eval     #Chronic Left Foot Drop  -Ankle Foot Othosis, carbon fiber: Daily wear for Foot Drop when OOB  - c/w current temporary AFO from orthotist, while awaiting custom AFO    #Pain control  - Acetaminophen 975 mg q8 hours  -oxyCODONE 5mg PRN q4 hours As needed Severe Pain (7 - 10)    #Asthma/#Asbestosis exposure  -Advair Diskus 250 mcg-50 mcg 1 puff inhaled BID  -Albuterol CFC free 90 mcg/inh inhalation aerosol: 2 puff(s) inhaled every 4 hours, PRN For SOB  -Arformoterol 15 mcg/2 mL inhalation solution:   -Budesonide 0.5 mg/2 mL inhalation suspension: inhaled once a day    #HTN  -Valsartan 40mg daily    #GI/Bowel Mgmt/#GERD  - Continue Senna at bedtime   - Miralax   - Polyethylene glycol BID  - Protonix 40 mg     #Bladder management--voiding     #DVT prophylaxis   - Heparin BID     #Skin:  - Left hip incision covered with Aquacel (saturated on admission) --> Pravena VAC placed by surgery on 4/14, removed on 4/17     FEN   - Diet - DASH Diet    Precautions / PROPHYLAXIS:   - Falls,  - Ortho: Weight bearing status: WBAT   - Lungs: Aspiration  - Pressure injury/Skin: OOB to Chair, PT/OT      HEALTH MAINTEANCE  Hx of cognitive deficits --referred to SLP for cognitve assessment  Falls prevention - await Osteoporosis labs   -------------------  liaison with family/providers    4/17-Daughter at bedside, participated during therapy, update on treatment discussed   4/18 Daughter reports that patient had neuro evaluation for memory deficits about 1yr prior, but there was no formal dx of overt cognitive dysfunction   -----------------------    IDT 4/17  Ambulating 100ft with walker, min assist  Left foot drop--pre fabricated AFO left leg, awaiting customized AFO  Est dc 4/29    F/U     Orthopedics  PCP  ------------------------------------  Weekend coverage:    - Chart reviewed  - Continue comprehensive rehabilitation program. Patient requires active and ongoing therapeutic interventions of multiple disciplines and can tolerate 3h/d of therapies. Can actively participate and benefit from an intensive rehabilitation program. Requires supervision of a rehabilitation physician and a coordinated interdisciplinary approach to providing rehabilitation.   - Continue current medications  - Follow up: routine labs  - Discussed with resident on call and interdisciplinary team

## 2025-04-19 NOTE — PROGRESS NOTE ADULT - SUBJECTIVE AND OBJECTIVE BOX
Patient is a 79y old  Male who presents with a chief complaint of Left femoral neck fracture s/p left hemiarthroplasty (18 Apr 2025 12:49)    ---------------------------------------------------------------------  SUBJECTIVE:  Seen and evaluated at bedside this AM. No acute issues overnight. Tolerated PT/OT sessions today without issues.     Other ROS:  Denies: headache, CP, SOB, pain, bowel or bladder issues  ----------------------------------------------------------------------  PHYSICAL EXAM:    Vital Signs Last 24 Hrs  T(C): 36.6 (19 Apr 2025 08:48), Max: 36.8 (18 Apr 2025 21:11)  T(F): 97.8 (19 Apr 2025 08:48), Max: 98.2 (18 Apr 2025 21:11)  HR: 69 (19 Apr 2025 08:48) (68 - 79)  BP: 139/69 (19 Apr 2025 08:48) (139/69 - 155/80)  BP(mean): --  RR: 16 (19 Apr 2025 08:48) (16 - 16)  SpO2: 96% (19 Apr 2025 08:48) (95% - 96%)    Parameters below as of 19 Apr 2025 08:48  Patient On (Oxygen Delivery Method): room air      Daily     Daily     PHYSICAL EXAM:    General - NAD, alert, follows commands  Heart- pulse regular  Lungs- breathing comfortably without respiratory distress  Abd- no visible abdominal distension   Ext- No calf pain, extremities well perfused  Neuro- no new focal deficits, moves all extremities, L foot drop  Skin:  left hip incision covered   ----------------------------------------------------------------------  RECENT LABS:                CAPILLARY BLOOD GLUCOSE        ----------------------------------------------------------------------  RECENT IMAGING:    ***  ----------------------------------------------------------------------  MEDICATIONS:  MEDICATIONS  (STANDING):  acetaminophen     Tablet .. 975 milliGRAM(s) Oral every 8 hours  albuterol    0.083% 2.5 milliGRAM(s) Nebulizer every 8 hours  buDESOnide    Inhalation Suspension 0.5 milliGRAM(s) Inhalation two times a day  heparin   Injectable 5000 Unit(s) SubCutaneous every 12 hours  lactobacillus acidophilus 1 Tablet(s) Oral two times a day with meals  melatonin 9 milliGRAM(s) Oral at bedtime  oxyCODONE    IR 5 milliGRAM(s) Oral <User Schedule>  pantoprazole    Tablet 40 milliGRAM(s) Oral before breakfast  polyethylene glycol 3350 17 Gram(s) Oral two times a day  senna 2 Tablet(s) Oral at bedtime  valsartan 40 milliGRAM(s) Oral daily    MEDICATIONS  (PRN):  albuterol    90 MICROgram(s) HFA Inhaler 2 Puff(s) Inhalation every 6 hours PRN Shortness of Breath  oxyCODONE    IR 5 milliGRAM(s) Oral every 4 hours PRN Severe Pain (7 - 10)  oxyCODONE    IR 2.5 milliGRAM(s) Oral every 4 hours PRN Moderate Pain (4 - 6)    ----------------------------------------------------------------------

## 2025-04-19 NOTE — PROGRESS NOTE ADULT - ASSESSMENT
Patient is a 79y old  Male who presents with a chief complaint of Left femoral neck fracture s/p left hemiarthroplasty (19 Apr 2025 12:59)  pt POD # 8 s/p left hip hemiarthroplasty  Ortho following for serous drainage from incision  no events noted overnight, pt doing well in rehab  denies incisional pain       pt doing well from surgical standpoint    drainage from incision tapering    plan  - continue to monitor drainage from incision  - DVT ppx  - WBAT LLE  - total hip precautions  - PT    case d/w Dr. Casanova

## 2025-04-19 NOTE — PROGRESS NOTE ADULT - SUBJECTIVE AND OBJECTIVE BOX
CC: Patient is a 79y old  Male who presents with a chief complaint of Left femoral neck fracture s/p left hemiarthroplasty (19 Apr 2025 12:47)      Interval History:    No overnight events.  No new complaints.    ALLERGIES:  No Known Allergies    MEDICATIONS  (STANDING):  acetaminophen     Tablet .. 975 milliGRAM(s) Oral every 8 hours  albuterol    0.083% 2.5 milliGRAM(s) Nebulizer every 8 hours  buDESOnide    Inhalation Suspension 0.5 milliGRAM(s) Inhalation two times a day  heparin   Injectable 5000 Unit(s) SubCutaneous every 12 hours  lactobacillus acidophilus 1 Tablet(s) Oral two times a day with meals  melatonin 9 milliGRAM(s) Oral at bedtime  oxyCODONE    IR 5 milliGRAM(s) Oral <User Schedule>  pantoprazole    Tablet 40 milliGRAM(s) Oral before breakfast  polyethylene glycol 3350 17 Gram(s) Oral two times a day  senna 2 Tablet(s) Oral at bedtime  valsartan 40 milliGRAM(s) Oral daily    MEDICATIONS  (PRN):  albuterol    90 MICROgram(s) HFA Inhaler 2 Puff(s) Inhalation every 6 hours PRN Shortness of Breath  oxyCODONE    IR 5 milliGRAM(s) Oral every 4 hours PRN Severe Pain (7 - 10)  oxyCODONE    IR 2.5 milliGRAM(s) Oral every 4 hours PRN Moderate Pain (4 - 6)    Vital Signs Last 24 Hrs  T(F): 97.8 (19 Apr 2025 08:48), Max: 98.2 (18 Apr 2025 21:11)  HR: 69 (19 Apr 2025 08:48) (68 - 79)  BP: 139/69 (19 Apr 2025 08:48) (139/69 - 155/80)  RR: 16 (19 Apr 2025 08:48) (16 - 16)  SpO2: 96% (19 Apr 2025 08:48) (95% - 96%)  I&O's Summary    18 Apr 2025 07:01  -  19 Apr 2025 07:00  --------------------------------------------------------  IN: 0 mL / OUT: 150 mL / NET: -150 mL      BMI (kg/m2): 29.3 (04-14-25 @ 15:51)    PHYSICAL EXAM:  GENERAL: NAD  CHEST/LUNG: No rales, rhonchi, wheezing; normal respiratory effort  HEART: RRR; No murmurs, rubs, or gallops  ABDOMEN: Soft, Nontender, Nondistended; Bowel sounds present  MSK/EXT:  No peripheral edema, 2+ Peripheral Pulses, No clubbing or digital cyanosis  PSYCH: Appropriate affect, Alert & Oriented    LABS:                        10.4   8.06  )-----------( 321      ( 17 Apr 2025 05:48 )             31.1       04-17    138  |  103  |  17  ----------------------------<  122  4.1   |  29  |  1.05    Ca    8.6      17 Apr 2025 05:48    TPro  5.9  /  Alb  2.4  /  TBili  0.9  /  DBili  x   /  AST  28  /  ALT  23  /  AlkPhos  103  04-17              Urinalysis Basic - ( 17 Apr 2025 05:48 )    Color: x / Appearance: x / SG: x / pH: x  Gluc: 122 mg/dL / Ketone: x  / Bili: x / Urobili: x   Blood: x / Protein: x / Nitrite: x   Leuk Esterase: x / RBC: x / WBC x   Sq Epi: x / Non Sq Epi: x / Bacteria: x        COVID-19 PCR: NotDetec (04-14-25 @ 17:45)      Care Discussed with Consultants/Other Providers: Yes

## 2025-04-19 NOTE — PROGRESS NOTE ADULT - ASSESSMENT
80yo male w/ hx of Asthma, HTN, presented to the ED accompanied by his wife s/p fall in the driveway while using a leaf blower to clean the driveway, noted on imaging to have Left femoral Neck fracture    #Fall  #Left Femoral neck fracture  -s/p L hip hemiarthroplasty 4/11  -Pain control as per rehab team  -management per ortho; WBAT  -PT/OT/PMR eval  -chronic foot drop - per ortho- carbon fiber AFO. Seen by Fab Holder and measurement taken    #HTN  -Valsartan  -monitor BP    #Asthma  #Asbestosis exposure  -Stable on RA  -CXR results noted; Clear lungs  -Continue Inhalers    #VTE ppx  -HSQ per ortho      4/17 labs reviewed

## 2025-04-20 DIAGNOSIS — S72.009A FRACTURE OF UNSPECIFIED PART OF NECK OF UNSPECIFIED FEMUR, INITIAL ENCOUNTER FOR CLOSED FRACTURE: ICD-10-CM

## 2025-04-20 PROCEDURE — 99232 SBSQ HOSP IP/OBS MODERATE 35: CPT | Mod: GC

## 2025-04-20 RX ADMIN — Medication 975 MILLIGRAM(S): at 14:03

## 2025-04-20 RX ADMIN — Medication 40 MILLIGRAM(S): at 05:35

## 2025-04-20 RX ADMIN — Medication 975 MILLIGRAM(S): at 22:20

## 2025-04-20 RX ADMIN — Medication 9 MILLIGRAM(S): at 21:42

## 2025-04-20 RX ADMIN — Medication 975 MILLIGRAM(S): at 06:05

## 2025-04-20 RX ADMIN — Medication 40 MILLIGRAM(S): at 05:36

## 2025-04-20 RX ADMIN — Medication 2.5 MILLIGRAM(S): at 08:15

## 2025-04-20 RX ADMIN — Medication 1 TABLET(S): at 09:02

## 2025-04-20 RX ADMIN — HEPARIN SODIUM 5000 UNIT(S): 1000 INJECTION INTRAVENOUS; SUBCUTANEOUS at 05:35

## 2025-04-20 RX ADMIN — POLYETHYLENE GLYCOL 3350 17 GRAM(S): 17 POWDER, FOR SOLUTION ORAL at 18:11

## 2025-04-20 RX ADMIN — Medication 1 TABLET(S): at 18:11

## 2025-04-20 RX ADMIN — Medication 975 MILLIGRAM(S): at 21:40

## 2025-04-20 RX ADMIN — HEPARIN SODIUM 5000 UNIT(S): 1000 INJECTION INTRAVENOUS; SUBCUTANEOUS at 18:11

## 2025-04-20 RX ADMIN — BUDESONIDE 0.5 MILLIGRAM(S): 0.25 SUSPENSION RESPIRATORY (INHALATION) at 20:52

## 2025-04-20 RX ADMIN — BUDESONIDE 0.5 MILLIGRAM(S): 0.25 SUSPENSION RESPIRATORY (INHALATION) at 08:16

## 2025-04-20 RX ADMIN — Medication 975 MILLIGRAM(S): at 05:35

## 2025-04-20 RX ADMIN — POLYETHYLENE GLYCOL 3350 17 GRAM(S): 17 POWDER, FOR SOLUTION ORAL at 05:36

## 2025-04-20 RX ADMIN — Medication 2 TABLET(S): at 21:41

## 2025-04-20 RX ADMIN — Medication 2.5 MILLIGRAM(S): at 14:24

## 2025-04-20 NOTE — PROGRESS NOTE ADULT - SUBJECTIVE AND OBJECTIVE BOX
Patient is a 79y old  Male who presents with a chief complaint of Left femoral neck fracture s/p left hemiarthroplasty (20 Apr 2025 09:26)    ---------------------------------------------------------------------  SUBJECTIVE:  Seen and evaluated at bedside this AM. No acute issues overnight. Tolerated PT/OT.    Other ROS:  Denies: headache, CP, SOB, pain, bowel or bladder issues    Last Bowel Movement: 18-Apr-2025 (04-19-25 @ 20:29)  Last Bowel Movement: 18-Apr-2025 (04-19-25 @ 08:48)  Last Bowel Movement: 18-Apr-2025 (04-18-25 @ 21:09)  Last Bowel Movement: 18-Apr-2025 (04-18-25 @ 08:39)  ----------------------------------------------------------------------  PHYSICAL EXAM:    Vital Signs Last 24 Hrs  T(C): 36.4 (20 Apr 2025 09:14), Max: 36.8 (19 Apr 2025 20:32)  T(F): 97.5 (20 Apr 2025 09:14), Max: 98.2 (19 Apr 2025 20:32)  HR: 65 (20 Apr 2025 09:14) (65 - 78)  BP: 134/68 (20 Apr 2025 09:14) (134/68 - 150/74)  BP(mean): --  RR: 16 (20 Apr 2025 09:14) (16 - 16)  SpO2: 96% (20 Apr 2025 09:14) (96% - 97%)    Parameters below as of 20 Apr 2025 09:14  Patient On (Oxygen Delivery Method): room air    PHYSICAL EXAM:    General - NAD, alert, follows commands  Heart- pulse regular  Lungs- breathing comfortably without respiratory distress  Abd- no visible abdominal distension   Ext- No calf pain, extremities well perfused  Neuro- no new focal deficits, moves all extremities, L foot drop  Skin:  left hip incision covered     ----------------------------------------------------------------------  RECENT LABS:                CAPILLARY BLOOD GLUCOSE        ----------------------------------------------------------------------  RECENT IMAGING:    ***  ----------------------------------------------------------------------  MEDICATIONS:  MEDICATIONS  (STANDING):  acetaminophen     Tablet .. 975 milliGRAM(s) Oral every 8 hours  albuterol    0.083% 2.5 milliGRAM(s) Nebulizer every 8 hours  buDESOnide    Inhalation Suspension 0.5 milliGRAM(s) Inhalation two times a day  heparin   Injectable 5000 Unit(s) SubCutaneous every 12 hours  lactobacillus acidophilus 1 Tablet(s) Oral two times a day with meals  melatonin 9 milliGRAM(s) Oral at bedtime  oxyCODONE    IR 5 milliGRAM(s) Oral <User Schedule>  pantoprazole    Tablet 40 milliGRAM(s) Oral before breakfast  polyethylene glycol 3350 17 Gram(s) Oral two times a day  senna 2 Tablet(s) Oral at bedtime  valsartan 40 milliGRAM(s) Oral daily    MEDICATIONS  (PRN):  albuterol    90 MICROgram(s) HFA Inhaler 2 Puff(s) Inhalation every 6 hours PRN Shortness of Breath  oxyCODONE    IR 5 milliGRAM(s) Oral every 4 hours PRN Severe Pain (7 - 10)  oxyCODONE    IR 2.5 milliGRAM(s) Oral every 4 hours PRN Moderate Pain (4 - 6)    ----------------------------------------------------------------------

## 2025-04-20 NOTE — PROGRESS NOTE ADULT - SUBJECTIVE AND OBJECTIVE BOX
Postoperative Day #: 9    79y Male admitted with Nondisplaced intertrochanteric fracture of unspecified femur, initial encounter for closed fracture  S/P Left hip kory    Pt seen at bedside with family member.  Pt without complaints.  Currently undergoing breathing treatment.  States hip feels well.  No complaints.  No overnight events.       T(F): 97.5 (04-20-25 @ 09:14), Max: 98.2 (04-19-25 @ 20:32)  HR: 65 (04-20-25 @ 09:14) (65 - 78)  BP: 134/68 (04-20-25 @ 09:14) (134/68 - 150/74)  RR: 16 (04-20-25 @ 09:14) (16 - 16)  SpO2: 96% (04-20-25 @ 09:14) (96% - 97%)  Wt(kg): --  CAPILLARY BLOOD GLUCOSE          PHYSICAL EXAM:  General: NAD  Neuro:  Alert & oriented   Extremities: Left hip aquacel noted.  Minimal staining noted on mid aquacel.  Sensation intact.  negative homans sign bilateral .  Distal pulses intact.  Left sided foot drop.       LABS:            I&O's Detail        RADIOLOGY:    Impression:

## 2025-04-20 NOTE — PROGRESS NOTE ADULT - ASSESSMENT
80 yo male here with pmhx of Asthma, COPD, HTN, GERd, here for left hip fracture s/p Left hip kory on 4/11.

## 2025-04-21 LAB
ALBUMIN SERPL ELPH-MCNC: 2.8 G/DL — LOW (ref 3.3–5)
ALP SERPL-CCNC: 102 U/L — SIGNIFICANT CHANGE UP (ref 40–120)
ALT FLD-CCNC: 28 U/L — SIGNIFICANT CHANGE UP (ref 10–45)
ANION GAP SERPL CALC-SCNC: 7 MMOL/L — SIGNIFICANT CHANGE UP (ref 5–17)
AST SERPL-CCNC: 21 U/L — SIGNIFICANT CHANGE UP (ref 10–40)
BASOPHILS # BLD AUTO: 0.07 K/UL — SIGNIFICANT CHANGE UP (ref 0–0.2)
BASOPHILS NFR BLD AUTO: 1 % — SIGNIFICANT CHANGE UP (ref 0–2)
BILIRUB SERPL-MCNC: 0.5 MG/DL — SIGNIFICANT CHANGE UP (ref 0.2–1.2)
BUN SERPL-MCNC: 17 MG/DL — SIGNIFICANT CHANGE UP (ref 7–23)
CALCIUM SERPL-MCNC: 8.9 MG/DL — SIGNIFICANT CHANGE UP (ref 8.4–10.5)
CHLORIDE SERPL-SCNC: 102 MMOL/L — SIGNIFICANT CHANGE UP (ref 96–108)
CO2 SERPL-SCNC: 29 MMOL/L — SIGNIFICANT CHANGE UP (ref 22–31)
CREAT SERPL-MCNC: 1.22 MG/DL — SIGNIFICANT CHANGE UP (ref 0.5–1.3)
DACRYOCYTES BLD QL SMEAR: SLIGHT — SIGNIFICANT CHANGE UP
EGFR: 60 ML/MIN/1.73M2 — SIGNIFICANT CHANGE UP
EGFR: 60 ML/MIN/1.73M2 — SIGNIFICANT CHANGE UP
EOSINOPHIL # BLD AUTO: 0.35 K/UL — SIGNIFICANT CHANGE UP (ref 0–0.5)
EOSINOPHIL NFR BLD AUTO: 5 % — SIGNIFICANT CHANGE UP (ref 0–6)
GLUCOSE SERPL-MCNC: 117 MG/DL — HIGH (ref 70–99)
HCT VFR BLD CALC: 32.4 % — LOW (ref 39–50)
HGB BLD-MCNC: 10.7 G/DL — LOW (ref 13–17)
LYMPHOCYTES # BLD AUTO: 1.45 K/UL — SIGNIFICANT CHANGE UP (ref 1–3.3)
LYMPHOCYTES # BLD AUTO: 21 % — SIGNIFICANT CHANGE UP (ref 13–44)
MANUAL SMEAR VERIFICATION: SIGNIFICANT CHANGE UP
MCHC RBC-ENTMCNC: 29.7 PG — SIGNIFICANT CHANGE UP (ref 27–34)
MCHC RBC-ENTMCNC: 33 G/DL — SIGNIFICANT CHANGE UP (ref 32–36)
MCV RBC AUTO: 90 FL — SIGNIFICANT CHANGE UP (ref 80–100)
MONOCYTES # BLD AUTO: 0.48 K/UL — SIGNIFICANT CHANGE UP (ref 0–0.9)
MONOCYTES NFR BLD AUTO: 7 % — SIGNIFICANT CHANGE UP (ref 2–14)
MYELOCYTES NFR BLD: 1 % — HIGH (ref 0–0)
NEUTROPHILS # BLD AUTO: 4.5 K/UL — SIGNIFICANT CHANGE UP (ref 1.8–7.4)
NEUTROPHILS NFR BLD AUTO: 65 % — SIGNIFICANT CHANGE UP (ref 43–77)
NRBC # BLD: 0 /100 WBCS — SIGNIFICANT CHANGE UP (ref 0–0)
NRBC BLD-RTO: 0 /100 WBCS — SIGNIFICANT CHANGE UP (ref 0–0)
OVALOCYTES BLD QL SMEAR: SLIGHT — SIGNIFICANT CHANGE UP
PLAT MORPH BLD: NORMAL — SIGNIFICANT CHANGE UP
PLATELET # BLD AUTO: 450 K/UL — HIGH (ref 150–400)
POTASSIUM SERPL-MCNC: 4.2 MMOL/L — SIGNIFICANT CHANGE UP (ref 3.5–5.3)
POTASSIUM SERPL-SCNC: 4.2 MMOL/L — SIGNIFICANT CHANGE UP (ref 3.5–5.3)
PROT SERPL-MCNC: 6.2 G/DL — SIGNIFICANT CHANGE UP (ref 6–8.3)
PTH-INTACT FLD-MCNC: 24 PG/ML — SIGNIFICANT CHANGE UP (ref 15–65)
RBC # BLD: 3.6 M/UL — LOW (ref 4.2–5.8)
RBC # FLD: 13.8 % — SIGNIFICANT CHANGE UP (ref 10.3–14.5)
RBC BLD AUTO: ABNORMAL
SODIUM SERPL-SCNC: 138 MMOL/L — SIGNIFICANT CHANGE UP (ref 135–145)
SPHEROCYTES BLD QL SMEAR: SLIGHT — SIGNIFICANT CHANGE UP
STOMATOCYTES BLD QL SMEAR: SLIGHT — SIGNIFICANT CHANGE UP
T4 FREE+ TSH PNL SERPL: 3.91 UIU/ML — SIGNIFICANT CHANGE UP (ref 0.27–4.2)
VIT B12 SERPL-MCNC: 490 PG/ML — SIGNIFICANT CHANGE UP (ref 232–1245)
WBC # BLD: 6.92 K/UL — SIGNIFICANT CHANGE UP (ref 3.8–10.5)
WBC # FLD AUTO: 6.92 K/UL — SIGNIFICANT CHANGE UP (ref 3.8–10.5)

## 2025-04-21 PROCEDURE — 99233 SBSQ HOSP IP/OBS HIGH 50: CPT

## 2025-04-21 PROCEDURE — 99232 SBSQ HOSP IP/OBS MODERATE 35: CPT

## 2025-04-21 RX ADMIN — Medication 9 MILLIGRAM(S): at 21:08

## 2025-04-21 RX ADMIN — BUDESONIDE 0.5 MILLIGRAM(S): 0.25 SUSPENSION RESPIRATORY (INHALATION) at 21:52

## 2025-04-21 RX ADMIN — HEPARIN SODIUM 5000 UNIT(S): 1000 INJECTION INTRAVENOUS; SUBCUTANEOUS at 17:25

## 2025-04-21 RX ADMIN — Medication 1 TABLET(S): at 11:41

## 2025-04-21 RX ADMIN — Medication 1 TABLET(S): at 17:25

## 2025-04-21 RX ADMIN — Medication 2.5 MILLIGRAM(S): at 15:45

## 2025-04-21 RX ADMIN — Medication 2.5 MILLIGRAM(S): at 21:52

## 2025-04-21 RX ADMIN — Medication 40 MILLIGRAM(S): at 05:45

## 2025-04-21 RX ADMIN — OXYCODONE HYDROCHLORIDE 5 MILLIGRAM(S): 30 TABLET ORAL at 06:52

## 2025-04-21 RX ADMIN — HEPARIN SODIUM 5000 UNIT(S): 1000 INJECTION INTRAVENOUS; SUBCUTANEOUS at 05:46

## 2025-04-21 RX ADMIN — Medication 975 MILLIGRAM(S): at 21:08

## 2025-04-21 RX ADMIN — Medication 975 MILLIGRAM(S): at 22:08

## 2025-04-21 RX ADMIN — Medication 2.5 MILLIGRAM(S): at 00:04

## 2025-04-21 RX ADMIN — POLYETHYLENE GLYCOL 3350 17 GRAM(S): 17 POWDER, FOR SOLUTION ORAL at 05:45

## 2025-04-21 NOTE — PROGRESS NOTE ADULT - ASSESSMENT
80yo male w/ hx of Asthma, HTN, presented to the ED accompanied by his wife s/p fall in the driveway while using a leaf blower to clean the driveway, noted on imaging to have Left femoral Neck fracture    #Fall  #Left Femoral neck fracture  -s/p L hip hemiarthroplasty 4/11  -Pain control as per rehab team  -management per ortho; WBAT  -PT/OT/PMR eval  -chronic foot drop - per ortho- carbon fiber AFO. Seen by Fab Holder and measurement taken    #HTN  -Valsartan  -monitor BP    #Asthma  #Asbestosis exposure  -Stable on RA  -CXR results noted; Clear lungs  -Continue Inhalers    #VTE ppx  -HSQ per ortho      4/21 labs reviewed

## 2025-04-21 NOTE — PROGRESS NOTE ADULT - NS ATTEND AMEND GEN_ALL_CORE FT
Seen and examined, findings as noted by NP/Med student    Clinically stable   Pain controlled except for pain over left IT Band  left hip surgical area unremarkable     Improved ambulatory distance   working on endurance     Labs unremarkable    Continue therapy   Update d/w son   Continue all supportive care      Spent 52 mins, patient review, further collateral hx from family, observation in therapy and care co ordination

## 2025-04-21 NOTE — PROGRESS NOTE ADULT - SUBJECTIVE AND OBJECTIVE BOX
CC: Patient is a 79y old  Male who presents with a chief complaint of Left femoral neck fracture s/p left hemiarthroplasty (20 Apr 2025 10:02)      Interval History:    No overnight events.  No new complaints.    ALLERGIES:  No Known Allergies    MEDICATIONS  (STANDING):  acetaminophen     Tablet .. 975 milliGRAM(s) Oral every 8 hours  albuterol    0.083% 2.5 milliGRAM(s) Nebulizer every 8 hours  buDESOnide    Inhalation Suspension 0.5 milliGRAM(s) Inhalation two times a day  heparin   Injectable 5000 Unit(s) SubCutaneous every 12 hours  lactobacillus acidophilus 1 Tablet(s) Oral two times a day with meals  melatonin 9 milliGRAM(s) Oral at bedtime  oxyCODONE    IR 5 milliGRAM(s) Oral <User Schedule>  pantoprazole    Tablet 40 milliGRAM(s) Oral before breakfast  polyethylene glycol 3350 17 Gram(s) Oral two times a day  senna 2 Tablet(s) Oral at bedtime  valsartan 40 milliGRAM(s) Oral daily    MEDICATIONS  (PRN):  albuterol    90 MICROgram(s) HFA Inhaler 2 Puff(s) Inhalation every 6 hours PRN Shortness of Breath  oxyCODONE    IR 5 milliGRAM(s) Oral every 4 hours PRN Severe Pain (7 - 10)  oxyCODONE    IR 2.5 milliGRAM(s) Oral every 4 hours PRN Moderate Pain (4 - 6)    Vital Signs Last 24 Hrs  T(F): 98.1 (21 Apr 2025 08:03), Max: 98.1 (21 Apr 2025 08:03)  HR: 68 (21 Apr 2025 08:03) (65 - 72)  BP: 146/71 (21 Apr 2025 08:03) (146/71 - 151/76)  RR: 16 (21 Apr 2025 08:03) (16 - 16)  SpO2: 97% (21 Apr 2025 08:03) (96% - 98%)  I&O's Summary        PHYSICAL EXAM:  GENERAL: NAD  CHEST/LUNG: No rales, rhonchi, wheezing; normal respiratory effort  HEART: RRR; No murmurs, rubs, or gallops  ABDOMEN: Soft, Nontender, Nondistended; Bowel sounds present  MSK/EXT:  No peripheral edema, 2+ Peripheral Pulses, No clubbing or digital cyanosis  PSYCH: Appropriate affect, Alert & Oriented    LABS:                        10.7   6.92  )-----------( 450      ( 21 Apr 2025 06:59 )             32.4       04-21    138  |  102  |  17  ----------------------------<  117  4.2   |  29  |  1.22    Ca    8.9      21 Apr 2025 06:59    TPro  6.2  /  Alb  2.8  /  TBili  0.5  /  DBili  x   /  AST  21  /  ALT  28  /  AlkPhos  102  04-21                 Urinalysis Basic - ( 21 Apr 2025 06:59 )    Color: x / Appearance: x / SG: x / pH: x  Gluc: 117 mg/dL / Ketone: x  / Bili: x / Urobili: x   Blood: x / Protein: x / Nitrite: x   Leuk Esterase: x / RBC: x / WBC x   Sq Epi: x / Non Sq Epi: x / Bacteria: x        COVID-19 PCR: [place] (04-14-25 @ 17:45)      Care Discussed with Consultants/Other Providers: Yes

## 2025-04-21 NOTE — CHART NOTE - NSCHARTNOTEFT_GEN_A_CORE
Nutrition Follow Up Note  Source: Medical Record [X] Patient [x] Family [ ]         Diet: Regular, DASH/TLC, Ensure Plus High Protein (provides 350 kcal, 20 g protein/serving)   Pt tolerating diet with fair PO intake, eating about 50% of meals per pt/nursing flow sheets. Pt did not receive Ensure Plus High Protein (provides 350 kcal, 20 g protein/serving) this AM. Brought pt a new bottle, pt willing to try supplement. Denies nausea, vomiting, diarrhea, constipation. Reviewed low sodium/heart healthy diet with pt.     Enteral/Parenteral Nutrition: N/A    Current Weight: 170.8 lbs (4-14)    Pertinent Medications: MEDICATIONS  (STANDING):  acetaminophen     Tablet .. 975 milliGRAM(s) Oral every 8 hours  albuterol    0.083% 2.5 milliGRAM(s) Nebulizer every 8 hours  buDESOnide    Inhalation Suspension 0.5 milliGRAM(s) Inhalation two times a day  heparin   Injectable 5000 Unit(s) SubCutaneous every 12 hours  lactobacillus acidophilus 1 Tablet(s) Oral two times a day with meals  melatonin 9 milliGRAM(s) Oral at bedtime  oxyCODONE    IR 5 milliGRAM(s) Oral <User Schedule>  pantoprazole    Tablet 40 milliGRAM(s) Oral before breakfast  polyethylene glycol 3350 17 Gram(s) Oral two times a day  senna 2 Tablet(s) Oral at bedtime  valsartan 40 milliGRAM(s) Oral daily    MEDICATIONS  (PRN):  albuterol    90 MICROgram(s) HFA Inhaler 2 Puff(s) Inhalation every 6 hours PRN Shortness of Breath  oxyCODONE    IR 5 milliGRAM(s) Oral every 4 hours PRN Severe Pain (7 - 10)  oxyCODONE    IR 2.5 milliGRAM(s) Oral every 4 hours PRN Moderate Pain (4 - 6)      Pertinent Labs:  04-21 Na138 mmol/L Glu 117 mg/dL[H] K+ 4.2 mmol/L Cr  1.22 mg/dL BUN 17 mg/dL 04-21 Alb 2.8 g/dL[L]        Skin: surgical incision per nursing flow sheets    Edema: No edema per nursing flow sheets     Last BM: on  4/18 per nursing flow sheets    Estimated Needs:   [X] No Change since Previous Assessment  [ ] Recalculated:     Previous Nutrition Diagnosis:   Moderate malnutrition, acute      Nutrition Diagnosis is [X] Ongoing  - addressed with Ensure Plus High Protein (provides 350 kcal, 20 g protein/serving)        New Nutrition Diagnosis: [X] Not Applicable  [ ] Inadequate Protein Energy Intake   [ ] Inadequate Oral Intake   [ ] Excessive Energy Intake   [ ] Increased Nutrient Needs   [ ] Obesity   [ ] Altered GI Function   [ ] Unintended Weight Loss   [ ] Food & Nutrition Related Knowledge Deficit  [ ] Limited Adherence to nutrition related recommendations   [ ] Malnutrition      Interventions:   1. Recommend continuing with current plan of care  2. Encourage PO intake  3. Obtain and honor food preferences as able      Monitoring & Evaluation:   [X] Weights   [X] PO Intake   [X] Follow Up (Per Protocol)  [X] Tolerance to Diet Prescription   [X] Other: Labs    RD Remains Available.  Valeria Benites RD

## 2025-04-21 NOTE — PROGRESS NOTE ADULT - ASSESSMENT
79M with pMHx of Asthma, HTN, GERD, chronic left foot drop,  h/o asbestosis exposure but no history of COPD- presented to the  ED accompanied by his wife s/p fall in the driveway while using a leaf blower on 4/10. X-ray of hip/femur with Left femoral Neck fracture- s/p Left hemiarthroplasty on 4/11. Not on anticoagulation. Patient with Gait Instability, ADL impairments and Functional impairments.    * Left foot drop - temporary AFO in use - await custom AFO  * SLP -mild cognitive impairment and dysphonia with intermittent vocal hoarseness- all deficits are baseline.   * Fall - await osteoporosis labs   * DVT ppx--Reduce heparin to bid  * Surgery/Ortho following for Prevena dressing management - removed on 4/17     #  Left femoral Neck fracture- s/p Left hemiarthroplasty on 4/11.  Gait Instability, ADL impairments and Functional impairments  - [facility] of PT/OT  - Posterior hip precautions    #History of Cognitive Deficits  - Await SLP eval - mild cognitive impairment and dysphonia with intermittent vocal hoarseness- all deficits are baseline.     #Chronic Left Foot Drop  -Ankle Foot Othosis, carbon fiber: Daily wear for Foot Drop when OOB  - c/w current temporary AFO from orthotist, while awaiting custom AFO    #Pain control  - Acetaminophen 975 mg q8 hours  -oxyCODONE 5mg PRN q4 hours As needed Severe Pain (7 - 10)    #Asthma/#Asbestosis exposure  -Advair Diskus 250 mcg-50 mcg 1 puff inhaled BID  -Albuterol CFC free 90 mcg/inh inhalation aerosol: 2 puff(s) inhaled every 4 hours, PRN For SOB  -Arformoterol 15 mcg/2 mL inhalation solution:   -Budesonide 0.5 mg/2 mL inhalation suspension: inhaled once a day    #HTN  -Valsartan 40mg daily    #GI/Bowel Mgmt/#GERD  - Continue Senna at bedtime   - Miralax   - Polyethylene glycol BID  - Protonix 40 mg     #Bladder management--voiding     #DVT prophylaxis   - Heparin BID     #Skin:  - Left hip incision covered with Aquacel (saturated on admission) --> Pravena VAC placed by surgery on 4/14, removed on 4/17     FEN   - Diet - DASH Diet    Precautions / PROPHYLAXIS:   - Falls,  - Ortho: Weight bearing status: WBAT   - Lungs: Aspiration  - Pressure injury/Skin: OOB to Chair, PT/OT      HEALTH MAINTEANCE  Hx of cognitive deficits --referred to SLP for cognitve assessment  Falls prevention - await Osteoporosis labs   -------------------  liaison with family/providers    4/17-Daughter at bedside, participated during therapy, update on treatment discussed   4/18 Daughter reports that patient had neuro evaluation for memory deficits about 1yr prior, but there was no formal dx of overt cognitive dysfunction   -----------------------    IDT 4/17  Ambulating 100ft with walker, min assist  Left foot drop--pre fabricated AFO left leg, awaiting customized AFO  Est dc 4/29    F/U     Orthopedics  PCP 79M with pMHx of Asthma, HTN, GERD, chronic left foot drop,  h/o asbestosis exposure but no history of COPD- presented to the  ED accompanied by his wife s/p fall in the driveway while using a leaf blower on 4/10. X-ray of hip/femur with Left femoral Neck fracture- s/p Left hemiarthroplasty on 4/11. Not on anticoagulation. Patient with Gait Instability, ADL impairments and Functional impairments.    * Left foot drop - temporary AFO in use - await custom AFO  * SLP -mild cognitive impairment and dysphonia with intermittent vocal hoarseness- all deficits are baseline.   * Improved oral intake and ambulatory distance  * Labs unremarkable     #  Left femoral Neck fracture- s/p Left hemiarthroplasty on 4/11.  Gait Instability, ADL impairments and Functional impairments  - [facility] of PT/OT  - Posterior hip precautions    #History of Cognitive Deficits  - Await SLP eval - mild cognitive impairment and dysphonia with intermittent vocal hoarseness- all deficits are baseline.     #Chronic Left Foot Drop  -Ankle Foot Othosis, carbon fiber: Daily wear for Foot Drop when OOB  - c/w current temporary AFO from orthotist, while awaiting custom AFO    #Pain control  - Acetaminophen 975 mg q8 hours  -oxyCODONE 5mg PRN q4 hours As needed Severe Pain (7 - 10)    #Asthma/#Asbestosis exposure  -Advair Diskus 250 mcg-50 mcg 1 puff inhaled BID  -Albuterol CFC free 90 mcg/inh inhalation aerosol: 2 puff(s) inhaled every 4 hours, PRN For SOB  -Arformoterol 15 mcg/2 mL inhalation solution:   -Budesonide 0.5 mg/2 mL inhalation suspension: inhaled once a day    #HTN  -Valsartan 40mg daily    #GI/Bowel Mgmt/#GERD  - Continue Senna at bedtime   - Miralax   - Polyethylene glycol BID  - Protonix 40 mg     #Bladder management--voiding     #DVT prophylaxis   - Heparin BID     #Skin:  - Left hip surgical area open to air     FEN   - Diet - DASH Diet    Precautions / PROPHYLAXIS:   - Falls,  - Ortho: Weight bearing status: WBAT   - Lungs: Aspiration  - Pressure injury/Skin: OOB to Chair, PT/OT      HEALTH MAINTEANCE  Hx of cognitive deficits --on f/u with SLP for cognitve assessment  Falls prevention - await Osteoporosis labs   -------------------  liaison with family/providers    4/17-Daughter at bedside, participated during therapy, update on treatment discussed   4/18 Daughter reports that patient had neuro evaluation for memory deficits about 1yr prior, but there was no formal dx of overt cognitive dysfunction   4/21--Patient's son at bedside, provided further collateral hx, improved oral intake, and pain controlled     IDT 4/17  Ambulating 100ft with walker, min assist  Left foot drop--pre fabricated AFO left leg, awaiting customized AFO  Est dc 4/29    F/U     Orthopedics  PCP

## 2025-04-21 NOTE — PROGRESS NOTE ADULT - SUBJECTIVE AND OBJECTIVE BOX
CC: S/p L hip hemiarthroplasty     Today's Subjective & Objective Findings:  Patient seen and examined at with AM therapy. Admits to improved sleep.  Temporary L AFO in use, awaiting custom AFO.   Son in room reports pt improved oral intake.   SLP neetu done - mild cognitive impairment and dysphonia with intermittent vocal hoarseness- all deficits are baseline.   Denies headache, dizziness, visual changes, chest pain, SOB/ROQUE, abdominal pain, nausea, vomiting, diarrhea, dysuria, numbness or tingling.  [facility] 4/20    Therapy  Ambulated 50 x 2 ft with RW min A +left LE AFO donned. Patient required verbal  cues and assistance for RW placement with cues for progressing to step through  pattern. Patient with difficulty and often presenting with step to 2/2  discomfort and decreased left weight bearing tolerance/muscle guarding.  Patient with effort demonstrated. Patient continues with slight internal  rotation of left LE - discussed hip precautions, able to self correct left LE  steps with verbal cues.    Vital Signs Last 24 Hrs  T(C): 36.7 (21 Apr 2025 08:03), Max: 36.7 (21 Apr 2025 08:03)  T(F): 98.1 (21 Apr 2025 08:03), Max: 98.1 (21 Apr 2025 08:03)  HR: 68 (21 Apr 2025 08:03) (65 - 72)  BP: 146/71 (21 Apr 2025 08:03) (146/71 - 151/76)  BP(mean): --  RR: 16 (21 Apr 2025 08:03) (16 - 16)  SpO2: 97% (21 Apr 2025 08:03) (96% - 98%)    Parameters below as of 21 Apr 2025 08:03  Patient On (Oxygen Delivery Method): room air      PHYSICAL EXAM  Constitutional - No acute distress, Comfortable  Neck - Supple  Cardiovascular - Palpable peripheral pulses   Respiratory/Chest- Symmetrical chest expansion, No dyspnea  Abdomen - Soft, Non-tender  Extremities - No cyanosis, No edema,     Neurologic Exam - Alert, Oriented, Interactive  Sensory - Light touch sensation intact  Motor Function - Moves all extremities spontaneously  Skin -  Left hip incision covered with dry dsg as needed (Prevena VAC 4/17); left hip ecchymosis, no active bleed         LABS:                      10.7   6.92  )-----------( 450      ( 21 Apr 2025 06:59 )             32.4     04-21    138  |  102  |  17  ----------------------------<  117[H]  4.2   |  29  |  1.22    Ca    8.9      21 Apr 2025 06:59    TPro  6.2  /  Alb  2.8[L]  /  TBili  0.5  /  DBili  x   /  AST  21  /  ALT  28  /  AlkPhos  102  04-21    LIVER FUNCTIONS - ( 21 Apr 2025 06:59 )  Alb: 2.8 g/dL / Pro: 6.2 g/dL / ALK PHOS: 102 U/L / ALT: 28 U/L / AST: 21 U/L / GGT: x              Urinalysis Basic - ( 21 Apr 2025 06:59 )    Color: x / Appearance: x / SG: x / pH: x  Gluc: 117 mg/dL / Ketone: x  / Bili: x / Urobili: x   Blood: x / Protein: x / Nitrite: x   Leuk Esterase: x / RBC: x / WBC x   Sq Epi: x / Non Sq Epi: x / Bacteria: x      CAPILLARY BLOOD GLUCOSE    MEDICATIONS  (STANDING):  acetaminophen     Tablet .. 975 milliGRAM(s) Oral every 8 hours  albuterol    0.083% 2.5 milliGRAM(s) Nebulizer every 8 hours  buDESOnide    Inhalation Suspension 0.5 milliGRAM(s) Inhalation two times a day  heparin   Injectable 5000 Unit(s) SubCutaneous every 12 hours  lactobacillus acidophilus 1 Tablet(s) Oral two times a day with meals  melatonin 9 milliGRAM(s) Oral at bedtime  oxyCODONE    IR 5 milliGRAM(s) Oral <User Schedule>  pantoprazole    Tablet 40 milliGRAM(s) Oral before breakfast  polyethylene glycol 3350 17 Gram(s) Oral two times a day  senna 2 Tablet(s) Oral at bedtime  valsartan 40 milliGRAM(s) Oral daily    MEDICATIONS  (PRN):  albuterol    90 MICROgram(s) HFA Inhaler 2 Puff(s) Inhalation every 6 hours PRN Shortness of Breath  oxyCODONE    IR 5 milliGRAM(s) Oral every 4 hours PRN Severe Pain (7 - 10)  oxyCODONE    IR 2.5 milliGRAM(s) Oral every 4 hours PRN Moderate Pain (4 - 6)   CC: S/p L hip hemiarthroplasty     Today's Subjective & Objective Findings:  Patient seen and examined at with AM therapy. Admits to improved sleep.  Temporary L AFO in use, awaiting custom AFO.   Son in room reports pt improved oral intake.   SLP neetu done - mild cognitive impairment and dysphonia with intermittent vocal hoarseness- all deficits are baseline.   Denies headache, dizziness, visual changes, chest pain, SOB/ROQUE, abdominal pain, nausea, vomiting, diarrhea, dysuria, numbness or tingling.  [facility] 4/20    Therapy  Pt ambulated 100ft with RW with close supervision, +left dorsiflexion AFO  Patient initially with improved attempts for step through pattern to advance  right LE and weight bear to left LE.  Close supervision for verbal cues during gait training to focus on maintaining  hip precautions    Stairs: Pre stair activity, step up step down with bilateral handrails with mod  A, patient requires increased time to tolerate weight acceptance on left LE  prior to advancing right LE onto step. Able to perform 3 trials retro stepping  to descend.  Negotiated 4 steps up, turn and 4 steps down forward stepping with mod A. Ascend  with right LE and descend with left LE, although at times pt to ascend with left  LE successfully without increased pain      Vital Signs Last 24 Hrs  T(C): 36.7 (21 Apr 2025 08:03), Max: 36.7 (21 Apr 2025 08:03)  T(F): 98.1 (21 Apr 2025 08:03), Max: 98.1 (21 Apr 2025 08:03)  HR: 68 (21 Apr 2025 08:03) (65 - 72)  BP: 146/71 (21 Apr 2025 08:03) (146/71 - 151/76)  RR: 16 (21 Apr 2025 08:03) (16 - 16)  SpO2: 97% (21 Apr 2025 08:03) (96% - 98%)  Parameters below as of 21 Apr 2025 08:03  Patient On (Oxygen Delivery Method): room air      PHYSICAL EXAM  Constitutional - No acute distress, Comfortable  Neck - Supple  Cardiovascular - Palpable peripheral pulses   Respiratory/Chest- Symmetrical chest expansion, No dyspnea  Abdomen - Soft, Non-tender  Extremities - No cyanosis, No edema,     Neurologic Exam - Alert, Oriented, Interactive  Sensory - Light touch sensation intact  Motor Function - Moves all extremities spontaneously  Skin -  Left hip incision open to air         LABS:                      10.7   6.92  )-----------( 450      ( 21 Apr 2025 06:59 )             32.4     04-21    138  |  102  |  17  ----------------------------<  117[H]  4.2   |  29  |  1.22    Ca    8.9      21 Apr 2025 06:59    TPro  6.2  /  Alb  2.8[L]  /  TBili  0.5  /  DBili  x   /  AST  21  /  ALT  28  /  AlkPhos  102  04-21    LIVER FUNCTIONS - ( 21 Apr 2025 06:59 )  Alb: 2.8 g/dL / Pro: 6.2 g/dL / ALK PHOS: 102 U/L / ALT: 28 U/L / AST: 21 U/L / GGT: x              Urinalysis Basic - ( 21 Apr 2025 06:59 )    Color: x / Appearance: x / SG: x / pH: x  Gluc: 117 mg/dL / Ketone: x  / Bili: x / Urobili: x   Blood: x / Protein: x / Nitrite: x   Leuk Esterase: x / RBC: x / WBC x   Sq Epi: x / Non Sq Epi: x / Bacteria: x      CAPILLARY BLOOD GLUCOSE    MEDICATIONS  (STANDING):  acetaminophen     Tablet .. 975 milliGRAM(s) Oral every 8 hours  albuterol    0.083% 2.5 milliGRAM(s) Nebulizer every 8 hours  buDESOnide    Inhalation Suspension 0.5 milliGRAM(s) Inhalation two times a day  heparin   Injectable 5000 Unit(s) SubCutaneous every 12 hours  lactobacillus acidophilus 1 Tablet(s) Oral two times a day with meals  melatonin 9 milliGRAM(s) Oral at bedtime  oxyCODONE    IR 5 milliGRAM(s) Oral <User Schedule>  pantoprazole    Tablet 40 milliGRAM(s) Oral before breakfast  polyethylene glycol 3350 17 Gram(s) Oral two times a day  senna 2 Tablet(s) Oral at bedtime  valsartan 40 milliGRAM(s) Oral daily    MEDICATIONS  (PRN):  albuterol    90 MICROgram(s) HFA Inhaler 2 Puff(s) Inhalation every 6 hours PRN Shortness of Breath  oxyCODONE    IR 5 milliGRAM(s) Oral every 4 hours PRN Severe Pain (7 - 10)  oxyCODONE    IR 2.5 milliGRAM(s) Oral every 4 hours PRN Moderate Pain (4 - 6)

## 2025-04-22 LAB — VIT D25+D1,25 OH+D1,25 PNL SERPL-MCNC: 23.5 PG/ML — SIGNIFICANT CHANGE UP (ref 19.9–79.3)

## 2025-04-22 PROCEDURE — 99233 SBSQ HOSP IP/OBS HIGH 50: CPT

## 2025-04-22 PROCEDURE — 99232 SBSQ HOSP IP/OBS MODERATE 35: CPT

## 2025-04-22 RX ADMIN — Medication 975 MILLIGRAM(S): at 21:16

## 2025-04-22 RX ADMIN — OXYCODONE HYDROCHLORIDE 5 MILLIGRAM(S): 30 TABLET ORAL at 14:24

## 2025-04-22 RX ADMIN — Medication 1 DOSE(S): at 20:59

## 2025-04-22 RX ADMIN — OXYCODONE HYDROCHLORIDE 5 MILLIGRAM(S): 30 TABLET ORAL at 06:28

## 2025-04-22 RX ADMIN — HEPARIN SODIUM 5000 UNIT(S): 1000 INJECTION INTRAVENOUS; SUBCUTANEOUS at 17:28

## 2025-04-22 RX ADMIN — HEPARIN SODIUM 5000 UNIT(S): 1000 INJECTION INTRAVENOUS; SUBCUTANEOUS at 06:29

## 2025-04-22 RX ADMIN — Medication 1 TABLET(S): at 08:03

## 2025-04-22 RX ADMIN — Medication 9 MILLIGRAM(S): at 21:15

## 2025-04-22 RX ADMIN — Medication 1 TABLET(S): at 17:28

## 2025-04-22 RX ADMIN — Medication 40 MILLIGRAM(S): at 06:29

## 2025-04-22 RX ADMIN — Medication 975 MILLIGRAM(S): at 06:29

## 2025-04-22 RX ADMIN — Medication 2 PUFF(S): at 08:37

## 2025-04-22 RX ADMIN — OXYCODONE HYDROCHLORIDE 5 MILLIGRAM(S): 30 TABLET ORAL at 13:24

## 2025-04-22 NOTE — PROGRESS NOTE ADULT - SUBJECTIVE AND OBJECTIVE BOX
Patient seen and examined  Dressing CDI  Staple line intact  Patient resting comfortably and doing well    Susan planned for dc on POD14  Continue Rehab    DLS

## 2025-04-22 NOTE — PROGRESS NOTE ADULT - ASSESSMENT
79M with pMHx of Asthma, HTN, GERD, chronic left foot drop,  h/o asbestosis exposure but no history of COPD- presented to the  ED accompanied by his wife s/p fall in the driveway while using a leaf blower on 4/10. X-ray of hip/femur with Left femoral Neck fracture- s/p Left hemiarthroplasty on 4/11. Not on anticoagulation. Patient with Gait Instability, ADL impairments and Functional impairments.    * Left foot drop - temporary AFO in use - await custom AFO  * SLP -mild cognitive impairment and dysphonia with intermittent vocal hoarseness- all deficits are baseline.   * Improved oral intake and ambulatory distance  * Labs unremarkable     #  Left femoral Neck fracture- s/p Left hemiarthroplasty on 4/11.  Gait Instability, ADL impairments and Functional impairments  - [facility]  - Posterior hip precautions    #History of Cognitive Deficits  - Await SLP eval - mild cognitive impairment and dysphonia with intermittent vocal hoarseness- all deficits are baseline.     #Chronic Left Foot Drop  -Ankle Foot Othosis, carbon fiber: Daily wear for Foot Drop when OOB  - c/w current temporary AFO from orthotist, while awaiting custom AFO    #Pain control  - Acetaminophen 975 mg q8 hours  -oxyCODONE 5mg PRN q4 hours As needed Severe Pain (7 - 10)    #Asthma/#Asbestosis exposure  -Advair Diskus 250 mcg-50 mcg 1 puff inhaled BID  -Albuterol CFC free 90 mcg/inh inhalation aerosol: 2 puff(s) inhaled every 4 hours, PRN For SOB  -Arformoterol 15 mcg/2 mL inhalation solution:   -Budesonide 0.5 mg/2 mL inhalation suspension: inhaled once a day    #HTN  -Valsartan 40mg daily    #GI/Bowel Mgmt/#GERD  - Continue Senna at bedtime   - Miralax   - Polyethylene glycol BID  - Protonix 40 mg     #Bladder management--voiding     #DVT prophylaxis   - Heparin BID     #Skin:  - Left hip surgical area open to air     FEN   - Diet - DASH Diet    Precautions / PROPHYLAXIS:   - Falls,  - Ortho: Weight bearing status: WBAT   - Lungs: Aspiration  - Pressure injury/Skin: OOB to Chair, PT/OT      HEALTH MAINTEANCE  Hx of cognitive deficits --on f/u with SLP for cognitive assessment  Falls prevention - await Osteoporosis labs   -------------------  liaison with family/providers    4/17-Daughter at bedside, participated during therapy, update on treatment discussed   4/18 Daughter reports that patient had neuro evaluation for memory deficits about 1yr prior, but there was no formal dx of overt cognitive dysfunction   4/21--Patient's son at bedside, provided further collateral hx, improved oral intake, and pain controlled     IDT 4/17  Ambulating 100ft with walker, min assist  Left foot drop--pre fabricated AFO left leg, awaiting customized AFO  Est dc 4/29      --------------------------------------------------------  Wolfgang Bloom  Internal Medicine  [address] [place] [address]  Phone: [phone]  Fax: [phone]  Follow Up Time:     Piotr Casanova  Orthopaedic Surgery  [address]  [place] [address]  Phone: [phone]  Fax: [phone]     79M with pMHx of Asthma, HTN, GERD, chronic left foot drop,  h/o asbestosis exposure but no history of COPD- presented to the  ED accompanied by his wife s/p fall in the driveway while using a leaf blower on 4/10. X-ray of hip/femur with Left femoral Neck fracture- s/p Left hemiarthroplasty on 4/11. Not on anticoagulation. Patient with Gait Instability, ADL impairments and Functional impairments.    * Left foot drop - temporary AFO in use - await custom AFO  * SLP -mild cognitive impairment and dysphonia with intermittent vocal hoarseness- all deficits are baseline.   * Improved oral intake and ambulatory distance  * Improvement with ambulatory distance noted       #  Left femoral Neck fracture- s/p Left hemiarthroplasty on 4/11.  Gait Instability, ADL impairments and Functional impairments  - [facility]  - Posterior hip precautions    #History of Cognitive Deficits  - Await SLP eval - mild cognitive impairment and dysphonia with intermittent vocal hoarseness- all deficits are baseline.     #Chronic Left Foot Drop  -Ankle Foot Othosis, carbon fiber: Daily wear for Foot Drop when OOB  - c/w current temporary AFO from orthotist, while awaiting custom AFO    #Pain control  - Acetaminophen 975 mg q8 hours  -oxyCODONE 5mg PRN q4 hours As needed Severe Pain (7 - 10)    #Asthma/#Asbestosis exposure  -Advair Diskus 250 mcg-50 mcg 1 puff inhaled BID  -Albuterol CFC free 90 mcg/inh inhalation aerosol: 2 puff(s) inhaled every 4 hours, PRN For SOB  -Arformoterol 15 mcg/2 mL inhalation solution:   -Budesonide 0.5 mg/2 mL inhalation suspension: inhaled once a day    #HTN  -Valsartan 40mg daily    #GI/Bowel Mgmt/#GERD  - Continue Senna at bedtime   - Miralax   - Polyethylene glycol BID  - Protonix 40 mg     #Bladder management--voiding     #DVT prophylaxis   - Heparin BID     #Skin:  - Left hip surgical area open to air     FEN   - Diet - DASH Diet    Precautions / PROPHYLAXIS:   - Falls,  - Ortho: Weight bearing status: WBAT   - Lungs: Aspiration  - Pressure injury/Skin: OOB to Chair, PT/OT      HEALTH MAINTEANCE  Hx of cognitive deficits --on f/u with SLP for cognitive assessment  Falls prevention - await Osteoporosis labs   -------------------  liaison with family/providers    4/17-Daughter at bedside, participated during therapy, update on treatment discussed   4/18 Daughter reports that patient had neuro evaluation for memory deficits about 1yr prior, but there was no formal dx of overt cognitive dysfunction   4/21--Patient's son at bedside, provided further collateral hx, improved oral intake, and pain controlled     IDT 4/17  Ambulating 100ft with walker, min assist  Left foot drop--pre fabricated AFO left leg, awaiting customized AFO  Est dc 4/29      --------------------------------------------------------  Wolfgang Bloom  Internal Medicine  [address] [place] [address]  Phone: [phone]  Fax: [phone]  Follow Up Time:     Piotr Casanova  Orthopaedic Surgery  [address] [facility]  [place] [address]  Phone: [phone]  Fax: [phone]

## 2025-04-22 NOTE — PROGRESS NOTE ADULT - SUBJECTIVE AND OBJECTIVE BOX
CC: S/p L hip hemiarthroplasty     Today's Subjective & Objective Findings:  Patient seen and examined in chair with breakfast. Admits to improved sleep and appetite.   Temporary L AFO in use, awaiting custom AFO.   SLP neetu done - mild cognitive impairment and dysphonia with intermittent vocal hoarseness- all deficits are baseline.   Denies headache, dizziness, visual changes, chest pain, SOB/ROQUE, abdominal pain, nausea, vomiting, diarrhea, dysuria, numbness or tingling.  LBM 4/21. New Left hip Aquacel dsg placed- staples underneath- c/d/i, no redness.     Therapy  Pt ambulated 100ft with RW with close supervision, +left dorsiflexion AFO  Patient initially with improved attempts for step through pattern to advance  right LE and weight bear to left LE.  Close supervision for verbal cues during gait training to focus on maintaining  hip precautions    Stairs: Pre stair activity, step up step down with bilateral handrails with mod  A, patient requires increased time to tolerate weight acceptance on left LE  prior to advancing right LE onto step. Able to perform 3 trials retro stepping  to descend.  Negotiated 4 steps up, turn and 4 steps down forward stepping with mod A. Ascend  with right LE and descend with left LE, although at times pt to ascend with left  LE successfully without increased pain    Vital Signs Last 24 Hrs  T(C): 36.6 (22 Apr 2025 07:58), Max: 36.8 (21 Apr 2025 19:32)  T(F): 97.8 (22 Apr 2025 07:58), Max: 98.3 (21 Apr 2025 19:32)  HR: 73 (22 Apr 2025 08:40) (65 - 78)  BP: 135/68 (22 Apr 2025 07:58) (135/68 - 154/72)  BP(mean): --  RR: 16 (22 Apr 2025 07:58) (16 - 16)  SpO2: 95% (22 Apr 2025 08:40) (95% - 99%)    Parameters below as of 22 Apr 2025 08:40  Patient On (Oxygen Delivery Method): room air    PHYSICAL EXAM  Constitutional - No acute distress, Comfortable  Neck - Supple  Cardiovascular - Palpable peripheral pulses   Respiratory/Chest- Symmetrical chest expansion, No dyspnea  Abdomen - Soft, Non-tender  Extremities - No cyanosis, No edema,     Neurologic Exam - Alert, Oriented, Interactive  Sensory - Light touch sensation intact  Motor Function - Moves all extremities spontaneously  Skin -  Left hip incision new Aquacel dsg applied- staples underneath.         LABS:                      10.7   6.92  )-----------( 450      ( 21 Apr 2025 06:59 )             32.4     04-21    138  |  102  |  17  ----------------------------<  117[H]  4.2   |  29  |  1.22    Ca    8.9      21 Apr 2025 06:59    TPro  6.2  /  Alb  2.8[L]  /  TBili  0.5  /  DBili  x   /  AST  21  /  ALT  28  /  AlkPhos  102  04-21    LIVER FUNCTIONS - ( 21 Apr 2025 06:59 )  Alb: 2.8 g/dL / Pro: 6.2 g/dL / ALK PHOS: 102 U/L / ALT: 28 U/L / AST: 21 U/L / GGT: x              Urinalysis Basic - ( 21 Apr 2025 06:59 )    Color: x / Appearance: x / SG: x / pH: x  Gluc: 117 mg/dL / Ketone: x  / Bili: x / Urobili: x   Blood: x / Protein: x / Nitrite: x   Leuk Esterase: x / RBC: x / WBC x   Sq Epi: x / Non Sq Epi: x / Bacteria: x      CAPILLARY BLOOD GLUCOSE    MEDICATIONS  (STANDING):  acetaminophen     Tablet .. 975 milliGRAM(s) Oral every 8 hours  fluticasone propionate/ salmeterol 250-50 MICROgram(s) Diskus 1 Dose(s) Inhalation two times a day  heparin   Injectable 5000 Unit(s) SubCutaneous every 12 hours  lactobacillus acidophilus 1 Tablet(s) Oral two times a day with meals  melatonin 9 milliGRAM(s) Oral at bedtime  oxyCODONE    IR 5 milliGRAM(s) Oral <User Schedule>  pantoprazole    Tablet 40 milliGRAM(s) Oral before breakfast  polyethylene glycol 3350 17 Gram(s) Oral two times a day  senna 2 Tablet(s) Oral at bedtime  valsartan 40 milliGRAM(s) Oral daily    MEDICATIONS  (PRN):  albuterol    90 MICROgram(s) HFA Inhaler 2 Puff(s) Inhalation every 6 hours PRN Shortness of Breath  oxyCODONE    IR 5 milliGRAM(s) Oral every 4 hours PRN Severe Pain (7 - 10)  oxyCODONE    IR 2.5 milliGRAM(s) Oral every 4 hours PRN Moderate Pain (4 - 6)   CC: S/p L hip hemiarthroplasty     Today's Subjective & Objective Findings:  Patient seen and examined in chair with breakfast. Admits to improved sleep and appetite.   Temporary L AFO in use, awaiting custom AFO.   SLP neetu done - mild cognitive impairment and dysphonia with intermittent vocal hoarseness- all deficits are baseline.   Denies headache, dizziness, visual changes, chest pain, SOB/ROQUE, abdominal pain, nausea, vomiting, diarrhea, dysuria, numbness or tingling.  LBM 4/21. New Left hip Aquacel dsg placed- staples underneath- c/d/i, no redness.     Therapy--increased ambulatory distance from 100 to 130ft over 24 hr  Pt ambulated 130ft x 2 ft with RW with close supervision, +left dorsiflexion AFO  with verbal cuing to increase bilateral heel strike in stance phase of gait,  take equal step length and for a more continuous gait pattern.  Patient with increased kristian and step length.    Patient negotiated up/down 4 steps with 1 handrail with min a of 1,  patient  with bilateral UE support to handrail, ascend with left LE and descend with  right LE. Patient with increased weight shifting ability and eccentric control  when descending steps    Vital Signs Last 24 Hrs  T(C): 36.6 (22 Apr 2025 07:58), Max: 36.8 (21 Apr 2025 19:32)  T(F): 97.8 (22 Apr 2025 07:58), Max: 98.3 (21 Apr 2025 19:32)  HR: 73 (22 Apr 2025 08:40) (65 - 78)  BP: 135/68 (22 Apr 2025 07:58) (135/68 - 154/72)  RR: 16 (22 Apr 2025 07:58) (16 - 16)  SpO2: 95% (22 Apr 2025 08:40) (95% - 99%)  Parameters below as of 22 Apr 2025 08:40  Patient On (Oxygen Delivery Method): room air    PHYSICAL EXAM  Constitutional - No acute distress, Comfortable  Neck - Supple  Cardiovascular - Palpable peripheral pulses   Respiratory/Chest- Symmetrical chest expansion, No dyspnea  Abdomen - Soft, Non-tender  Extremities - No cyanosis, No edema,     Neurologic Exam - Alert, Oriented, Interactive  Sensory - Light touch sensation intact  Motor Function - Moves all extremities spontaneously  Skin -  Left hip incision new Aquacel dsg applied- staples underneath.     LABS:                      10.7   6.92  )-----------( 450      ( 21 Apr 2025 06:59 )             32.4     04-21    138  |  102  |  17  ----------------------------<  117[H]  4.2   |  29  |  1.22    Ca    8.9      21 Apr 2025 06:59    TPro  6.2  /  Alb  2.8[L]  /  TBili  0.5  /  DBili  x   /  AST  21  /  ALT  28  /  AlkPhos  102  04-21    LIVER FUNCTIONS - ( 21 Apr 2025 06:59 )  Alb: 2.8 g/dL / Pro: 6.2 g/dL / ALK PHOS: 102 U/L / ALT: 28 U/L / AST: 21 U/L / GGT: x              Urinalysis Basic - ( 21 Apr 2025 06:59 )    Color: x / Appearance: x / SG: x / pH: x  Gluc: 117 mg/dL / Ketone: x  / Bili: x / Urobili: x   Blood: x / Protein: x / Nitrite: x   Leuk Esterase: x / RBC: x / WBC x   Sq Epi: x / Non Sq Epi: x / Bacteria: x      CAPILLARY BLOOD GLUCOSE    MEDICATIONS  (STANDING):  acetaminophen     Tablet .. 975 milliGRAM(s) Oral every 8 hours  fluticasone propionate/ salmeterol 250-50 MICROgram(s) Diskus 1 Dose(s) Inhalation two times a day  heparin   Injectable 5000 Unit(s) SubCutaneous every 12 hours  lactobacillus acidophilus 1 Tablet(s) Oral two times a day with meals  melatonin 9 milliGRAM(s) Oral at bedtime  oxyCODONE    IR 5 milliGRAM(s) Oral <User Schedule>  pantoprazole    Tablet 40 milliGRAM(s) Oral before breakfast  polyethylene glycol 3350 17 Gram(s) Oral two times a day  senna 2 Tablet(s) Oral at bedtime  valsartan 40 milliGRAM(s) Oral daily    MEDICATIONS  (PRN):  albuterol    90 MICROgram(s) HFA Inhaler 2 Puff(s) Inhalation every 6 hours PRN Shortness of Breath  oxyCODONE    IR 5 milliGRAM(s) Oral every 4 hours PRN Severe Pain (7 - 10)  oxyCODONE    IR 2.5 milliGRAM(s) Oral every 4 hours PRN Moderate Pain (4 - 6)

## 2025-04-22 NOTE — PROGRESS NOTE ADULT - SUBJECTIVE AND OBJECTIVE BOX
CC: Patient is a 79y old  Male who presents with a chief complaint of Left femoral neck fracture s/p left hemiarthroplasty (21 Apr 2025 14:03)      Interval History:    Patient seen and examined at bedside.  No overnight events.  No new complaints.    ALLERGIES:  No Known Allergies    MEDICATIONS  (STANDING):  acetaminophen     Tablet .. 975 milliGRAM(s) Oral every 8 hours  albuterol    0.083% 2.5 milliGRAM(s) Nebulizer every 8 hours  buDESOnide    Inhalation Suspension 0.5 milliGRAM(s) Inhalation two times a day  heparin   Injectable 5000 Unit(s) SubCutaneous every 12 hours  lactobacillus acidophilus 1 Tablet(s) Oral two times a day with meals  melatonin 9 milliGRAM(s) Oral at bedtime  oxyCODONE    IR 5 milliGRAM(s) Oral <User Schedule>  pantoprazole    Tablet 40 milliGRAM(s) Oral before breakfast  polyethylene glycol 3350 17 Gram(s) Oral two times a day  senna 2 Tablet(s) Oral at bedtime  valsartan 40 milliGRAM(s) Oral daily    MEDICATIONS  (PRN):  albuterol    90 MICROgram(s) HFA Inhaler 2 Puff(s) Inhalation every 6 hours PRN Shortness of Breath  oxyCODONE    IR 5 milliGRAM(s) Oral every 4 hours PRN Severe Pain (7 - 10)  oxyCODONE    IR 2.5 milliGRAM(s) Oral every 4 hours PRN Moderate Pain (4 - 6)    Vital Signs Last 24 Hrs  T(F): 97.8 (22 Apr 2025 07:58), Max: 98.3 (21 Apr 2025 19:32)  HR: 73 (22 Apr 2025 08:40) (65 - 78)  BP: 135/68 (22 Apr 2025 07:58) (135/68 - 154/72)  RR: 16 (22 Apr 2025 07:58) (16 - 16)  SpO2: 95% (22 Apr 2025 08:40) (95% - 99%)  I&O's Summary        PHYSICAL EXAM:  GENERAL: NAD  CHEST/LUNG: No rales, rhonchi, wheezing; normal respiratory effort  HEART: RRR; No murmurs, rubs, or gallops  ABDOMEN: Soft, Nontender, Nondistended; Bowel sounds present  MSK/EXT:  No peripheral edema, 2+ Peripheral Pulses, No clubbing or digital cyanosis  PSYCH: Appropriate affect, Alert & Oriented    LABS:                        10.7   6.92  )-----------( 450      ( 21 Apr 2025 06:59 )             32.4       04-21    138  |  102  |  17  ----------------------------<  117  4.2   |  29  |  1.22    Ca    8.9      21 Apr 2025 06:59    TPro  6.2  /  Alb  2.8  /  TBili  0.5  /  DBili  x   /  AST  21  /  ALT  28  /  AlkPhos  102  04-21                  TSH --   TSH with FT4 reflex 3.91  Total T3 --                  Urinalysis Basic - ( 21 Apr 2025 06:59 )    Color: x / Appearance: x / SG: x / pH: x  Gluc: 117 mg/dL / Ketone: x  / Bili: x / Urobili: x   Blood: x / Protein: x / Nitrite: x   Leuk Esterase: x / RBC: x / WBC x   Sq Epi: x / Non Sq Epi: x / Bacteria: x        COVID-19 PCR: NotDetec (04-14-25 @ 17:45)      Care Discussed with Consultants/Other Providers: Yes

## 2025-04-22 NOTE — PROGRESS NOTE ADULT - ASSESSMENT
80yo male w/ hx of Asthma, HTN, presented to the ED accompanied by his wife s/p fall in the driveway while using a leaf blower to clean the driveway, noted on imaging to have Left femoral Neck fracture    #Fall  #Left Femoral neck fracture  -s/p L hip hemiarthroplasty 4/11  -Pain control as per rehab team  -management per ortho; WBAT  -PT/OT/PMR eval  -chronic foot drop - per ortho- carbon fiber AFO. Seen by Fab Holder and measurement taken    #HTN  -Valsartan  -monitor BP    #Asthma  #Asbestosis exposure  -Stable on RA  -CXR results noted; Clear lungs  -stopped standing albuterol  -on albuterol inhaler PRN  -changed pulmicort neb to advair BID    #VTE ppx  -HSQ per ortho      4/21 labs reviewed

## 2025-04-22 NOTE — PROGRESS NOTE ADULT - NS ATTEND AMEND GEN_ALL_CORE FT
Patient seen and observed in therapy     Slept well, improved oral intake  Pain controlled  Left hip surgical wound examined, aquacel removed, staples in situ, open to air, no erythema    Clinically stable     Improved ambulatory distance in therapy  Breathing normalized, no dyspnea       Continue therapy   DVT ppx        Spent 53 mins, patient review, observation in therapy, and care co ordination

## 2025-04-23 PROCEDURE — 99232 SBSQ HOSP IP/OBS MODERATE 35: CPT

## 2025-04-23 PROCEDURE — 99233 SBSQ HOSP IP/OBS HIGH 50: CPT

## 2025-04-23 RX ADMIN — Medication 975 MILLIGRAM(S): at 14:29

## 2025-04-23 RX ADMIN — Medication 1 TABLET(S): at 17:18

## 2025-04-23 RX ADMIN — Medication 975 MILLIGRAM(S): at 21:35

## 2025-04-23 RX ADMIN — Medication 1 TABLET(S): at 08:29

## 2025-04-23 RX ADMIN — Medication 1 DOSE(S): at 08:53

## 2025-04-23 RX ADMIN — HEPARIN SODIUM 5000 UNIT(S): 1000 INJECTION INTRAVENOUS; SUBCUTANEOUS at 06:28

## 2025-04-23 RX ADMIN — Medication 2 TABLET(S): at 21:35

## 2025-04-23 RX ADMIN — Medication 975 MILLIGRAM(S): at 13:29

## 2025-04-23 RX ADMIN — Medication 975 MILLIGRAM(S): at 06:29

## 2025-04-23 RX ADMIN — Medication 9 MILLIGRAM(S): at 21:35

## 2025-04-23 RX ADMIN — HEPARIN SODIUM 5000 UNIT(S): 1000 INJECTION INTRAVENOUS; SUBCUTANEOUS at 17:18

## 2025-04-23 RX ADMIN — OXYCODONE HYDROCHLORIDE 5 MILLIGRAM(S): 30 TABLET ORAL at 06:29

## 2025-04-23 RX ADMIN — Medication 1 DOSE(S): at 20:49

## 2025-04-23 RX ADMIN — Medication 975 MILLIGRAM(S): at 22:05

## 2025-04-23 RX ADMIN — Medication 40 MILLIGRAM(S): at 06:29

## 2025-04-23 RX ADMIN — Medication 2 PUFF(S): at 08:54

## 2025-04-23 NOTE — PROGRESS NOTE ADULT - NS ATTEND AMEND GEN_ALL_CORE FT
Seen and examined, note revised    Patient and wife engaged for review today   Slept well  Breathing normalized  Pain controlled     Improved function with therapy     Left hip staples open to air    Continue therapy   DVT ppx  Will confirm timing of staples removal with orthopedist       Spent 53 mins, patient review, discussion of treatment plan and care co ordination

## 2025-04-23 NOTE — PROGRESS NOTE ADULT - ASSESSMENT
79M with pMHx of Asthma, HTN, GERD, chronic left foot drop,  h/o asbestosis exposure but no history of COPD- presented to the  ED accompanied by his wife s/p fall in the driveway while using a leaf blower on 4/10. X-ray of hip/femur with Left femoral Neck fracture- s/p Left hemiarthroplasty on 4/11. Not on anticoagulation. Patient with Gait Instability, ADL impairments and Functional impairments.    * Left foot drop - temporary AFO in use - await custom AFO  * SLP -mild cognitive impairment and dysphonia with intermittent vocal hoarseness- all deficits are baseline.   * Improved oral intake and ambulatory distance  * Improvement with ambulatory distance noted -staples KELSEY underneath- c/d/i, no redness- will confirm with surgical PA when staples need to be removed- 4/23.       #  Left femoral Neck fracture- s/p Left hemiarthroplasty on 4/11.  Gait Instability, ADL impairments and Functional impairments  - [facility]  - Posterior hip precautions    #History of Cognitive Deficits  - Await SLP eval - mild cognitive impairment and dysphonia with intermittent vocal hoarseness- all deficits are baseline.     #Chronic Left Foot Drop  -Ankle Foot Othosis, carbon fiber: Daily wear for Foot Drop when OOB  - c/w current temporary AFO from orthotist, while awaiting custom AFO    #Pain control  - Acetaminophen 975 mg q8 hours  -oxyCODONE 5mg PRN q4 hours As needed Severe Pain (7 - 10)    #Asthma/#Asbestosis exposure  -Advair Diskus 250 mcg-50 mcg 1 puff inhaled BID  -Albuterol CFC free 90 mcg/inh inhalation aerosol: 2 puff(s) inhaled every 4 hours, PRN For SOB  -Arformoterol 15 mcg/2 mL inhalation solution:   -Budesonide 0.5 mg/2 mL inhalation suspension: inhaled once a day    #HTN  -Valsartan 40mg daily    #GI/Bowel Mgmt/#GERD  - Continue Senna at bedtime   - Miralax   - Polyethylene glycol BID  - Protonix 40 mg     #Bladder management--voiding     #DVT prophylaxis   - Heparin BID     #Skin:  - Left hip surgical area open to air     FEN   - Diet - DASH Diet    Precautions / PROPHYLAXIS:   - Falls,  - Ortho: Weight bearing status: WBAT   - Lungs: Aspiration  - Pressure injury/Skin: OOB to Chair, PT/OT      HEALTH MAINTEANCE  Hx of cognitive deficits --on f/u with SLP for cognitive assessment  Falls prevention - await Osteoporosis labs   -------------------  liaison with family/providers    4/17-Daughter at bedside, participated during therapy, update on treatment discussed   4/18 Daughter reports that patient had neuro evaluation for memory deficits about 1yr prior, but there was no formal dx of overt cognitive dysfunction   4/21--Patient's son at bedside, provided further collateral hx, improved oral intake, and pain controlled     IDT 4/17  Ambulating 100ft with walker, min assist  Left foot drop--pre fabricated AFO left leg, awaiting customized AFO  Est dc 4/29      --------------------------------------------------------  F/U     Wolfgang Jcaome.  Internal Medicine  [address] [place] [address]  Phone: [phone]  Fax: [phone]  Follow Up Time:     Piotr Casanova  Orthopaedic Surgery  [address]  [place] [address]  Phone: [phone]  Fax: [phone]     79M with pMHx of Asthma, HTN, GERD, chronic left foot drop,  h/o asbestosis exposure but no history of COPD- presented to the  ED accompanied by his wife s/p fall in the driveway while using a leaf blower on 4/10. X-ray of hip/femur with Left femoral Neck fracture- s/p Left hemiarthroplasty on 4/11. Not on anticoagulation. Patient with Gait Instability, ADL impairments and Functional impairments.    * Left foot drop - temporary AFO in use - await custom AFO  * SLP -mild cognitive impairment and dysphonia with intermittent vocal hoarseness- all deficits are baseline.   * Improved oral intake and ambulatory distance  * Improvement with ambulatory distance noted -staples KELSEY underneath- c/d/i, no redness- will confirm with surgical PA when staples need to be removed- 4/23.   * FMLA form to be completed for daughter    #  Left femoral Neck fracture- s/p Left hemiarthroplasty on 4/11.  Gait Instability, ADL impairments and Functional impairments  - [facility]  - Posterior hip precautions    #History of Cognitive Deficits  - Await SLP eval - mild cognitive impairment and dysphonia with intermittent vocal hoarseness- all deficits are baseline.     #Chronic Left Foot Drop  -Ankle Foot Othosis, carbon fiber: Daily wear for Foot Drop when OOB  - c/w current temporary AFO from orthotist, while awaiting custom AFO    #Pain control  - Acetaminophen 975 mg q8 hours  -oxyCODONE 5mg PRN q4 hours As needed Severe Pain (7 - 10)    #Asthma/#Asbestosis exposure  -Advair Diskus 250 mcg-50 mcg 1 puff inhaled BID  -Albuterol CFC free 90 mcg/inh inhalation aerosol: 2 puff(s) inhaled every 4 hours, PRN For SOB  -Arformoterol 15 mcg/2 mL inhalation solution:   -Budesonide 0.5 mg/2 mL inhalation suspension: inhaled once a day    #HTN  -Valsartan 40mg daily    #GI/Bowel Mgmt/#GERD  - Continue Senna at bedtime   - Miralax   - Polyethylene glycol BID  - Protonix 40 mg     #Bladder management--voiding     #DVT prophylaxis   - Heparin BID     #Skin:  - Left hip surgical area open to air     FEN   - Diet - DASH Diet    Precautions / PROPHYLAXIS:   - Falls,  - Ortho: Weight bearing status: WBAT   - Lungs: Aspiration  - Pressure injury/Skin: OOB to Chair, PT/OT      HEALTH MAINTEANCE  Hx of cognitive deficits --on f/u with SLP for cognitive assessment  Falls prevention - await Osteoporosis labs   -------------------  liaison with family/providers    4/23-Wife at bedside, participated during therapy, update on treatment discussed   She is happy with patient's sustained functional progress and improved oral intake     IDT 4/17  Ambulating 100ft with walker, min assist  Left foot drop--pre fabricated AFO left leg, awaiting customized AFO  Est dc 4/29      --------------------------------------------------------  F/U     Wolfgang Jacome.  Internal Medicine  [address] [place] [address]  Phone: [phone]  Fax: [phone]  Follow Up Time:     Piotr Casanova  Orthopaedic Surgery  [address] [facility]  [place] [address]  Phone: [phone]  Fax: [phone]

## 2025-04-23 NOTE — PROGRESS NOTE ADULT - ASSESSMENT
80yo male w/ hx of Asthma, HTN, presented to the ED accompanied by his wife s/p fall in the driveway while using a leaf blower to clean the driveway, noted on imaging to have Left femoral Neck fracture    #Fall  #Left Femoral neck fracture  -s/p L hip hemiarthroplasty 4/11  -Pain control as per rehab team  -management per ortho; WBAT  -PT/OT/PMR eval  -chronic foot drop - per ortho- carbon fiber AFO. Seen by Fab Holder and measurement taken    #HTN  -Valsartan  -monitor BP    #Asthma  #Asbestosis exposure  -Stable on RA  -CXR results noted; Clear lungs  -stopped standing albuterol  -on albuterol inhaler PRN  -changed pulmicort neb to advair BID    #VTE ppx  -HSQ per ortho      4/21 labs reviewed  next labs in AM

## 2025-04-23 NOTE — PROGRESS NOTE ADULT - SUBJECTIVE AND OBJECTIVE BOX
CC: S/p L hip hemiarthroplasty     Today's Subjective & Objective Findings:  Patient seen and examined in chair with breakfast. Admits to improved sleep and appetite.   Temporary L AFO in use, awaiting custom AFO.   SLP neetu done - mild cognitive impairment and dysphonia with intermittent vocal hoarseness- all deficits are baseline.   Denies headache, dizziness, visual changes, chest pain, SOB/ROQUE, abdominal pain, nausea, vomiting, diarrhea, dysuria, numbness or tingling.  LBM 4/22. New Left hip Aquacel dsg removed by surgery- staples KELSEY underneath- c/d/i, no redness.     Therapy--increased ambulatory distance from 100 to 130ft over 24 hr  Pt ambulated 130ft x 2 ft with RW with close supervision, +left dorsiflexion AFO  with verbal cuing to increase bilateral heel strike in stance phase of gait,  take equal step length and for a more continuous gait pattern.  Patient with increased kristian and step length.    Patient negotiated up/down 4 steps with 1 handrail with min a of 1,  patient  with bilateral UE support to handrail, ascend with left LE and descend with  right LE. Patient with increased weight shifting ability and eccentric control  when descending steps    Vital Signs Last 24 Hrs  T(C): 36.8 (23 Apr 2025 08:27), Max: 37.1 (22 Apr 2025 20:20)  T(F): 98.3 (23 Apr 2025 08:27), Max: 98.8 (22 Apr 2025 20:20)  HR: 64 (23 Apr 2025 08:54) (64 - 69)  BP: 142/64 (23 Apr 2025 08:27) (142/64 - 149/83)  BP(mean): --  RR: 16 (23 Apr 2025 08:27) (16 - 16)  SpO2: 94% (23 Apr 2025 08:54) (94% - 98%)    Parameters below as of 23 Apr 2025 08:54  Patient On (Oxygen Delivery Method): room air      PHYSICAL EXAM  Constitutional - No acute distress, Comfortable  Neck - Supple  Cardiovascular - Palpable peripheral pulses   Respiratory/Chest- Symmetrical chest expansion, No dyspnea  Abdomen - Soft, Non-tender  Extremities - No cyanosis, No edema,     Neurologic Exam - Alert, Oriented, Interactive  Sensory - Light touch sensation intact  Motor Function - Moves all extremities spontaneously  Skin -  Left hip incision new Aquacel dsg applied- staples underneath.     LABS:                      10.7   6.92  )-----------( 450      ( 21 Apr 2025 06:59 )             32.4     04-21    138  |  102  |  17  ----------------------------<  117[H]  4.2   |  29  |  1.22    Ca    8.9      21 Apr 2025 06:59    TPro  6.2  /  Alb  2.8[L]  /  TBili  0.5  /  DBili  x   /  AST  21  /  ALT  28  /  AlkPhos  102  04-21    LIVER FUNCTIONS - ( 21 Apr 2025 06:59 )  Alb: 2.8 g/dL / Pro: 6.2 g/dL / ALK PHOS: 102 U/L / ALT: 28 U/L / AST: 21 U/L / GGT: x              Urinalysis Basic - ( 21 Apr 2025 06:59 )    Color: x / Appearance: x / SG: x / pH: x  Gluc: 117 mg/dL / Ketone: x  / Bili: x / Urobili: x   Blood: x / Protein: x / Nitrite: x   Leuk Esterase: x / RBC: x / WBC x   Sq Epi: x / Non Sq Epi: x / Bacteria: x      CAPILLARY BLOOD GLUCOSE    MEDICATIONS  (STANDING):  acetaminophen     Tablet .. 975 milliGRAM(s) Oral every 8 hours  fluticasone propionate/ salmeterol 250-50 MICROgram(s) Diskus 1 Dose(s) Inhalation two times a day  heparin   Injectable 5000 Unit(s) SubCutaneous every 12 hours  lactobacillus acidophilus 1 Tablet(s) Oral two times a day with meals  melatonin 9 milliGRAM(s) Oral at bedtime  oxyCODONE    IR 5 milliGRAM(s) Oral <User Schedule>  pantoprazole    Tablet 40 milliGRAM(s) Oral before breakfast  polyethylene glycol 3350 17 Gram(s) Oral two times a day  senna 2 Tablet(s) Oral at bedtime  valsartan 40 milliGRAM(s) Oral daily    MEDICATIONS  (PRN):  albuterol    90 MICROgram(s) HFA Inhaler 2 Puff(s) Inhalation every 6 hours PRN Shortness of Breath  oxyCODONE    IR 5 milliGRAM(s) Oral every 4 hours PRN Severe Pain (7 - 10)  oxyCODONE    IR 2.5 milliGRAM(s) Oral every 4 hours PRN Moderate Pain (4 - 6)   CC: S/p L hip hemiarthroplasty     Today's Subjective & Objective Findings:  Patient seen and examined in chair with breakfast. Admits to improved sleep and appetite.   Temporary L AFO in use, awaiting custom AFO.   SLP neetu done - mild cognitive impairment and dysphonia with intermittent vocal hoarseness- all deficits are baseline.   Denies headache, dizziness, visual changes, chest pain, SOB/ROQUE, abdominal pain, nausea, vomiting, diarrhea, dysuria, numbness or tingling.  LBM 4/22. New Left hip Aquacel dsg removed by surgery- staples KELSEY underneath- c/d/i, no redness.     Therapy--  Gait training in the parallel bars with 1 bar on right for 4 trials with vc's  for step through gait.    Patient negotiated up/down 8 steps with bilateral UEs on 1 handrail with CG/min  A, step to step pattern  (ascend with left LE and descend with left LE)  Up/down 4 steps with 1 rail and SAC right ascending with min/mod a step to step  ascent with left LE leading descent with left LE.      Vital Signs Last 24 Hrs  T(C): 36.8 (23 Apr 2025 08:27), Max: 37.1 (22 Apr 2025 20:20)  T(F): 98.3 (23 Apr 2025 08:27), Max: 98.8 (22 Apr 2025 20:20)  HR: 64 (23 Apr 2025 08:54) (64 - 69)  BP: 142/64 (23 Apr 2025 08:27) (142/64 - 149/83)  RR: 16 (23 Apr 2025 08:27) (16 - 16)  SpO2: 94% (23 Apr 2025 08:54) (94% - 98%)    Parameters below as of 23 Apr 2025 08:54  Patient On (Oxygen Delivery Method): room air      PHYSICAL EXAM  Constitutional - No acute distress, Comfortable  Neck - Supple  Cardiovascular - Palpable peripheral pulses   Respiratory/Chest- Symmetrical chest expansion, No dyspnea  Abdomen - Soft, Non-tender  Extremities - No cyanosis, No edema,     Neurologic Exam - Alert, Oriented, Interactive  Sensory - Light touch sensation intact  Motor Function - Moves all extremities spontaneously  Skin -  Left hip incision new Aquacel dsg applied- staples underneath.     LABS:                      10.7   6.92  )-----------( 450      ( 21 Apr 2025 06:59 )             32.4     04-21    138  |  102  |  17  ----------------------------<  117[H]  4.2   |  29  |  1.22    Ca    8.9      21 Apr 2025 06:59    TPro  6.2  /  Alb  2.8[L]  /  TBili  0.5  /  DBili  x   /  AST  21  /  ALT  28  /  AlkPhos  102  04-21    LIVER FUNCTIONS - ( 21 Apr 2025 06:59 )  Alb: 2.8 g/dL / Pro: 6.2 g/dL / ALK PHOS: 102 U/L / ALT: 28 U/L / AST: 21 U/L / GGT: x              Urinalysis Basic - ( 21 Apr 2025 06:59 )    Color: x / Appearance: x / SG: x / pH: x  Gluc: 117 mg/dL / Ketone: x  / Bili: x / Urobili: x   Blood: x / Protein: x / Nitrite: x   Leuk Esterase: x / RBC: x / WBC x   Sq Epi: x / Non Sq Epi: x / Bacteria: x      CAPILLARY BLOOD GLUCOSE    MEDICATIONS  (STANDING):  acetaminophen     Tablet .. 975 milliGRAM(s) Oral every 8 hours  fluticasone propionate/ salmeterol 250-50 MICROgram(s) Diskus 1 Dose(s) Inhalation two times a day  heparin   Injectable 5000 Unit(s) SubCutaneous every 12 hours  lactobacillus acidophilus 1 Tablet(s) Oral two times a day with meals  melatonin 9 milliGRAM(s) Oral at bedtime  oxyCODONE    IR 5 milliGRAM(s) Oral <User Schedule>  pantoprazole    Tablet 40 milliGRAM(s) Oral before breakfast  polyethylene glycol 3350 17 Gram(s) Oral two times a day  senna 2 Tablet(s) Oral at bedtime  valsartan 40 milliGRAM(s) Oral daily    MEDICATIONS  (PRN):  albuterol    90 MICROgram(s) HFA Inhaler 2 Puff(s) Inhalation every 6 hours PRN Shortness of Breath  oxyCODONE    IR 5 milliGRAM(s) Oral every 4 hours PRN Severe Pain (7 - 10)  oxyCODONE    IR 2.5 milliGRAM(s) Oral every 4 hours PRN Moderate Pain (4 - 6)

## 2025-04-23 NOTE — PROGRESS NOTE ADULT - SUBJECTIVE AND OBJECTIVE BOX
CC: Patient is a 79y old  Male who presents with a chief complaint of Left femoral neck fracture s/p left hemiarthroplasty (23 Apr 2025 12:59)      Interval History:    No overnight events.  No new complaints.    ALLERGIES:  No Known Allergies    MEDICATIONS  (STANDING):  acetaminophen     Tablet .. 975 milliGRAM(s) Oral every 8 hours  fluticasone propionate/ salmeterol 250-50 MICROgram(s) Diskus 1 Dose(s) Inhalation two times a day  heparin   Injectable 5000 Unit(s) SubCutaneous every 12 hours  lactobacillus acidophilus 1 Tablet(s) Oral two times a day with meals  melatonin 9 milliGRAM(s) Oral at bedtime  oxyCODONE    IR 5 milliGRAM(s) Oral <User Schedule>  pantoprazole    Tablet 40 milliGRAM(s) Oral before breakfast  polyethylene glycol 3350 17 Gram(s) Oral two times a day  senna 2 Tablet(s) Oral at bedtime  valsartan 40 milliGRAM(s) Oral daily    MEDICATIONS  (PRN):  albuterol    90 MICROgram(s) HFA Inhaler 2 Puff(s) Inhalation every 6 hours PRN Shortness of Breath  oxyCODONE    IR 5 milliGRAM(s) Oral every 4 hours PRN Severe Pain (7 - 10)  oxyCODONE    IR 2.5 milliGRAM(s) Oral every 4 hours PRN Moderate Pain (4 - 6)    Vital Signs Last 24 Hrs  T(F): 98.3 (23 Apr 2025 08:27), Max: 98.8 (22 Apr 2025 20:20)  HR: 64 (23 Apr 2025 08:54) (64 - 69)  BP: 142/64 (23 Apr 2025 08:27) (142/64 - 149/83)  RR: 16 (23 Apr 2025 08:27) (16 - 16)  SpO2: 94% (23 Apr 2025 08:54) (94% - 98%)  I&O's Summary        PHYSICAL EXAM:  GENERAL: NAD  CHEST/LUNG: No rales, rhonchi, wheezing; normal respiratory effort  HEART: RRR; No murmurs, rubs, or gallops  ABDOMEN: Soft, Nontender, Nondistended; Bowel sounds present  MSK/EXT:  No peripheral edema, 2+ Peripheral Pulses, No clubbing or digital cyanosis  PSYCH: Appropriate affect, Alert & Oriented    LABS:                        10.7   6.92  )-----------( 450      ( 21 Apr 2025 06:59 )             32.4       04-21    138  |  102  |  17  ----------------------------<  117  4.2   |  29  |  1.22    Ca    8.9      21 Apr 2025 06:59    TPro  6.2  /  Alb  2.8  /  TBili  0.5  /  DBili  x   /  AST  21  /  ALT  28  /  AlkPhos  102  04-21                  TSH --   TSH with FT4 reflex 3.91  Total T3 --                  Urinalysis Basic - ( 21 Apr 2025 06:59 )    Color: x / Appearance: x / SG: x / pH: x  Gluc: 117 mg/dL / Ketone: x  / Bili: x / Urobili: x   Blood: x / Protein: x / Nitrite: x   Leuk Esterase: x / RBC: x / WBC x   Sq Epi: x / Non Sq Epi: x / Bacteria: x        COVID-19 PCR: [place] (04-14-25 @ 17:45)      Care Discussed with Consultants/Other Providers: Yes OB/GYN  Post-Partum Progress Note  Hospital Location:  [facility]    Patient: Brynn Contreras Date: 2021   : 1989 Attending: Aure Heredia DO   32 year old female Admit Date: 2021    Allergies:   ALLERGIES:   Allergen Reactions   • Cetrorelix Acetate RASH     Welt, red rash, lasting 12+ hours even with benadryl   • Minocycline HIVES     Immunizations:    Most Recent Immunizations   Administered Date(s) Administered   • DTaP 1992   • HIB, Unspecified Formulation 1989   • HPV Bivalent 2011   • HPV Quadrivalent 2012   • Hep B, adolescent or pediatric 1998   • Influenza Quadrivalent, MDCK (Flucelvax) 2019   • Influenza, injectable, quadrivalent 10/02/2018   • Influenza, seasonal, injectable, trivalent 10/03/2017   • MMR 1992   • Meningococcal polysaccharide (MPSV4) 2005   • Polio, Oral 1988   • Tdap 2021   Deferred Date(s) Deferred   • MMR 2021   • Tdap 2021     Vitals:  Visit Vitals  BP (!) 141/82 (BP Location: LUE - Left upper extremity, Patient Position: Sitting)   Pulse 74   Temp 98.2 °F (36.8 °C) (Oral)   Resp 16   Ht 5' 2\" (1.575 m)   Wt 84.8 kg   LMP 2020 (Exact Date)   SpO2 97%   Breastfeeding Yes   BMI 34.20 kg/m²     Vital Last Value 24 Hour Range   Temperature 98.2 °F (36.8 °C) Temp  Min: 97.7 °F (36.5 °C)  Max: 98.2 °F (36.8 °C)   Pulse 74 Pulse  Min: 67  Max: 80   Respiratory 16 Resp  Min: 16  Max: 18   Blood Pressure (!) 141/82 BP  Min: 141/82  Max: 147/98   Pulse Oximetry 97 % SpO2  Min: 97 %  Max: 97 %   CVP   No data recorded     Vital Today Admit   Weight 84.8 kg Weight: 84.8 kg   Height N/A Height: 5' 2\" (157.5 cm)   BMI N/A BMI (Calculated): 34.2     Medications/Infusions:  Scheduled:   • docusate sodium  100 mg Oral BID   • labetalol  200 mg Oral 3 times per day   • NIFEdipine XL  60 mg Oral Q12H   • acetaminophen  500 mg Oral Q8H    Or   • acetaminophen  650 mg Rectal Q8H   •  montelukast  10 mg Oral Nightly   • PRENATAL vitamin & mineral w/ folic acid 1 mg  1 tablet Oral Daily   • measles-mumps-rubella  0.5 mL Subcutaneous Once   • diphtheria-pertussis (acellular) tetanus vaccine  0.5 mL Intramuscular Once   • lactated ringers  1,000 mL Intravenous Once   • sodium chloride (PF)  2 mL Intracatheter 2 times per day     HISTORY   Brynn Contreras is  with a history of:     Past Medical History:   Diagnosis Date   • Exercise-induced asthma    • Female infertility    • History of hysterosalpingogram 2018    [place] HYSTEROSALPINGOGRAM (HSG) by Dr. Otto    • Hx of cold sores    • Obesity (BMI 30-39.9)    • Preeclampsia, severe 2021     Patient Active Problem List   Diagnosis   • Other headache syndrome   • Exercise-induced coughing spell   • Endometriosis   • Pregnancy resulting from in vitro fertilization, antepartum   • Exercise-induced asthma   • Preeclampsia, severe   • S/P  section     Presented to [facility] for severe headache, vision changes and is now postpartum/postoperative from:  , Low Transverse [251] .   .  EBL:   ml.    Information for the patient's :  Diane, Girl Brynn \"Darin\" [[id number]]   female   .   reports that she has never smoked. She has never used smokeless tobacco..  REVIEW OF SYSTEMS   Subjective:    She is breast feeding and has been breast feeding which  in NICU.  Patient reports: Pain Controlled.   Normal Bleeding.   Tolerating Diet.   (+) Flatus.   Voiding Without Difficulty.   Slight Pain at Incision.   Bowel Movement.    Patient denies:  Nausea.   Vomiting.   Increased Vaginal Bleeding.   Headache.   Vision Changes.   RUQ Pain.    PHYSICAL EXAM   Physical Exam:  CV:  RRR  Lungs:  CTA bilaterally throughout  Uterine fundus firm, non-tender. Midline  Abdomen:  soft, slight tenderness at incision  Abdominal Incision: Pfannenstiel:   Tape/Surgical Glue. Clean, Dry & Intact.  Bowel Sounds:  Present  Extremities:  Non-tender, 3+ bilateral lower extremity edema  Pulses:  2+ and symmetric    Laboratory Data:    Recent Labs   Lab 21  0438 21  0533 21  0548 21  0527 21  0710 21  1641 21  1640   WBC 13.2* 14.1* 13.7* 13.2* 13.0*  --  11.3*   HGB 10.0* 10.1* 10.1* 10.6* 12.8  --  12.5   HCT 31.0* 30.0* 29.5* 31.4* 37.0  --  35.1*    245 222 216 247  --  239   CREATININE 0.66 0.71 0.76  --  0.65  --  0.66   AST 35 30 27  --  26  --  25   GPT 40 33 27  --  20  --  21    240 264*  --  237  --  219   URIC 4.8 5.0 5.1  --  4.9  --  4.6   POTASSIUM 3.9 3.6 4.0  --  4.2  --  3.8   CO2 24 25 30  --  23  --  24   ALBUMIN 2.2* 2.1* 1.8*  --  2.5*  --  2.4*   GLUCOSE 86 102* 80  --  138*  --  86   PRCR  --   --   --   --   --  309*  --        AB Rh Positive  CULTURE, STREPTOCOCCUS GROUP B WITH SENSITIVITIES (PENICILLIN ALLERGIC)   Date Value Ref Range Status   2021   Final    Negative for  Streptococcus agalactiae (Strep group B)     No results found for: UAMPH, UBAR, UPCP, UBNZ, UTHC, UCOCA, UCOCATAC, UMETH, UOPIA    Intake/Output:  Last Stool Occurrence:    Date 21 - 2159 21 - 21 0659   Shift 7455-1846 9150-9628 2870-6911 24 Hour Total 3112-9294 8935-8760 3723-9650 24 Hour Total   INTAKE   P.O. 596        Shift Total 596        OUTPUT   Urine 900 7293 054 5630       Shift Total 900 9421 155 1422       Weight (kg) 84.8 84.8 84.8 84.8 84.8 84.8 84.8 84.8     ASSESSMENT/PLAN   Admit Date:  2021  Pertinent Reviewed: Allergies, Medications, EPIC Data, Op Note, Physician Notes  Operation/Procedure:  Primary  Section.   Low Transverse Incision.   @ 35w0d d/t failure of progress  Delivery:   @ 1656 PP/Post-op Day:   4    · Post-Operative:  · Continue postpartum/post-operative care  · Reports pain controlled  · Tolerating diet, denies N/V  · Reports (+) flatus, (+) BM  · (+) void  · DVT/VTE Prophylaxis: Yes,  DANY Stockings Ambulatory  · Antibiotics D/C'd within 24 hoursof surgery end: Yes  · Incision:    · Pfannenstiel:   Tape/Surgical Glue. Clean, Dry & Intact.  · Preeclampsia-Severe  · S/p 24H magnesium sulfate post-partum  · Current Medications:    · Procardia XL 60mg Q12H  · Labetalol 300mg TID  · However, only received 200mg this morning, will add additional 100mg to morning dose per new Cardiology orders of 300mg TID  · Cardiology now following, appreciate  · B/P past 24H:  141-147 / 82-96  ·  most recent @ 0642 = 147/88  · denies current symptoms  · H/H on admit = 12.5/35.1  · Now with acute blood loss  · POD1 = 10.6/31.4, POD2 = 10.1/29.5, POD3 = 10.1/30.0, POD4 = 10.0/31.0  · Denies s/s hypovolemia  · Platelets on admit = 239  · POD1 = 216, POD2 = 222, POD3 = 245, POD4 = 284  · AST/ALT on admit = 25/21  · POD2 = 27/27, POD3 = 30/33, POD4 = 35/40  · LDH on admit = 219  · POD2 = 264, POD3 = 240, POD4 = 203  · Uric Acid on admit = 4.6  · POD2 = 5.1, POD3 = 5.0, POD4 = 4.8  · Pad side rails  · SCD/Teds  · Strict I/O:  Currently (- 4695.9) since admit  · Had 3.9L urine output in past 24H  · Blood Type:  AB Rh Positive  · GBS:  Negative  · [place]:  Female, currently in NICU  · breast feeding  · PPBC:  · Uncertain   · Discussed pelvic rest x 6 weeks post-partum  · Disposition:  · Progressing appropriately  · Vaccines prior to d/c as appropriate   · Preferred pharmacy updated in discharge planning  · Plan for discharge:  Anticipate today after dinner if remains <150 SBP & < 90DBP  · Further disposition per Dr. Aure Reyna, DNP, ANP-BC, APNP  2021  Dr. Aure Heredia for co-signature.    Treatment Team: Attending Provider: Aure Heredia DO; Surgeon: Bipin Otto MD; Respiratory Therapist: Pedro Hauser, DARRICK; Consulting Physician: Rian Thomas MD; Registered Nurse: Veronique Soto RN

## 2025-04-24 LAB
ALBUMIN SERPL ELPH-MCNC: 2.8 G/DL — LOW (ref 3.3–5)
ALP SERPL-CCNC: 140 U/L — HIGH (ref 40–120)
ALT FLD-CCNC: 18 U/L — SIGNIFICANT CHANGE UP (ref 10–45)
ANION GAP SERPL CALC-SCNC: 7 MMOL/L — SIGNIFICANT CHANGE UP (ref 5–17)
AST SERPL-CCNC: 15 U/L — SIGNIFICANT CHANGE UP (ref 10–40)
BASOPHILS # BLD AUTO: 0.03 K/UL — SIGNIFICANT CHANGE UP (ref 0–0.2)
BASOPHILS NFR BLD AUTO: 0.3 % — SIGNIFICANT CHANGE UP (ref 0–2)
BILIRUB SERPL-MCNC: 0.4 MG/DL — SIGNIFICANT CHANGE UP (ref 0.2–1.2)
BUN SERPL-MCNC: 18 MG/DL — SIGNIFICANT CHANGE UP (ref 7–23)
CALCIUM SERPL-MCNC: 8.9 MG/DL — SIGNIFICANT CHANGE UP (ref 8.4–10.5)
CHLORIDE SERPL-SCNC: 101 MMOL/L — SIGNIFICANT CHANGE UP (ref 96–108)
CO2 SERPL-SCNC: 27 MMOL/L — SIGNIFICANT CHANGE UP (ref 22–31)
CREAT SERPL-MCNC: 1.17 MG/DL — SIGNIFICANT CHANGE UP (ref 0.5–1.3)
EGFR: 63 ML/MIN/1.73M2 — SIGNIFICANT CHANGE UP
EGFR: 63 ML/MIN/1.73M2 — SIGNIFICANT CHANGE UP
EOSINOPHIL # BLD AUTO: 0.38 K/UL — SIGNIFICANT CHANGE UP (ref 0–0.5)
EOSINOPHIL NFR BLD AUTO: 4 % — SIGNIFICANT CHANGE UP (ref 0–6)
GLUCOSE SERPL-MCNC: 117 MG/DL — HIGH (ref 70–99)
HCT VFR BLD CALC: 30.9 % — LOW (ref 39–50)
HGB BLD-MCNC: 10.3 G/DL — LOW (ref 13–17)
IMM GRANULOCYTES NFR BLD AUTO: 1.8 % — HIGH (ref 0–0.9)
LYMPHOCYTES # BLD AUTO: 1.06 K/UL — SIGNIFICANT CHANGE UP (ref 1–3.3)
LYMPHOCYTES # BLD AUTO: 11.3 % — LOW (ref 13–44)
MCHC RBC-ENTMCNC: 30 PG — SIGNIFICANT CHANGE UP (ref 27–34)
MCHC RBC-ENTMCNC: 33.3 G/DL — SIGNIFICANT CHANGE UP (ref 32–36)
MCV RBC AUTO: 90.1 FL — SIGNIFICANT CHANGE UP (ref 80–100)
MONOCYTES # BLD AUTO: 0.74 K/UL — SIGNIFICANT CHANGE UP (ref 0–0.9)
MONOCYTES NFR BLD AUTO: 7.9 % — SIGNIFICANT CHANGE UP (ref 2–14)
NEUTROPHILS # BLD AUTO: 7.04 K/UL — SIGNIFICANT CHANGE UP (ref 1.8–7.4)
NEUTROPHILS NFR BLD AUTO: 74.7 % — SIGNIFICANT CHANGE UP (ref 43–77)
NRBC BLD AUTO-RTO: 0 /100 WBCS — SIGNIFICANT CHANGE UP (ref 0–0)
PLATELET # BLD AUTO: 468 K/UL — HIGH (ref 150–400)
POTASSIUM SERPL-MCNC: 4.2 MMOL/L — SIGNIFICANT CHANGE UP (ref 3.5–5.3)
POTASSIUM SERPL-SCNC: 4.2 MMOL/L — SIGNIFICANT CHANGE UP (ref 3.5–5.3)
PROT SERPL-MCNC: 6.1 G/DL — SIGNIFICANT CHANGE UP (ref 6–8.3)
RBC # BLD: 3.43 M/UL — LOW (ref 4.2–5.8)
RBC # FLD: 13.9 % — SIGNIFICANT CHANGE UP (ref 10.3–14.5)
SODIUM SERPL-SCNC: 135 MMOL/L — SIGNIFICANT CHANGE UP (ref 135–145)
WBC # BLD: 9.42 K/UL — SIGNIFICANT CHANGE UP (ref 3.8–10.5)
WBC # FLD AUTO: 9.42 K/UL — SIGNIFICANT CHANGE UP (ref 3.8–10.5)

## 2025-04-24 PROCEDURE — 99232 SBSQ HOSP IP/OBS MODERATE 35: CPT

## 2025-04-24 RX ADMIN — OXYCODONE HYDROCHLORIDE 5 MILLIGRAM(S): 30 TABLET ORAL at 07:35

## 2025-04-24 RX ADMIN — POLYETHYLENE GLYCOL 3350 17 GRAM(S): 17 POWDER, FOR SOLUTION ORAL at 06:40

## 2025-04-24 RX ADMIN — HEPARIN SODIUM 5000 UNIT(S): 1000 INJECTION INTRAVENOUS; SUBCUTANEOUS at 06:38

## 2025-04-24 RX ADMIN — Medication 40 MILLIGRAM(S): at 06:35

## 2025-04-24 RX ADMIN — Medication 975 MILLIGRAM(S): at 06:34

## 2025-04-24 RX ADMIN — Medication 975 MILLIGRAM(S): at 07:30

## 2025-04-24 RX ADMIN — Medication 1 DOSE(S): at 08:21

## 2025-04-24 RX ADMIN — HEPARIN SODIUM 5000 UNIT(S): 1000 INJECTION INTRAVENOUS; SUBCUTANEOUS at 17:41

## 2025-04-24 RX ADMIN — Medication 1 TABLET(S): at 17:41

## 2025-04-24 RX ADMIN — OXYCODONE HYDROCHLORIDE 5 MILLIGRAM(S): 30 TABLET ORAL at 08:35

## 2025-04-24 RX ADMIN — Medication 1 TABLET(S): at 07:55

## 2025-04-24 RX ADMIN — POLYETHYLENE GLYCOL 3350 17 GRAM(S): 17 POWDER, FOR SOLUTION ORAL at 17:41

## 2025-04-24 RX ADMIN — Medication 9 MILLIGRAM(S): at 20:31

## 2025-04-24 RX ADMIN — Medication 975 MILLIGRAM(S): at 20:31

## 2025-04-24 RX ADMIN — Medication 975 MILLIGRAM(S): at 21:10

## 2025-04-24 RX ADMIN — Medication 2 TABLET(S): at 20:32

## 2025-04-24 RX ADMIN — Medication 1 DOSE(S): at 22:04

## 2025-04-24 NOTE — PROGRESS NOTE ADULT - SUBJECTIVE AND OBJECTIVE BOX
CC: S/p L hip hemiarthroplasty     Today's Subjective & Objective Findings:  Patient seen and examined in chair with breakfast. Admits to improved sleep and appetite.   Temporary L AFO in use, awaiting custom AFO.   SLP neetu done - mild cognitive impairment and dysphonia with intermittent vocal hoarseness- all deficits are baseline.   Denies headache, dizziness, visual changes, chest pain, SOB/ROQUE, abdominal pain, nausea, vomiting, diarrhea, dysuria, numbness or tingling.  LBM 4/22. New Left hip Aquacel dsg removed by surgery- staples KELSEY underneath- c/d/i, no redness.     Therapy--  Gait training in the parallel bars with 1 bar on right for 4 trials with vc's  for step through gait.    Patient negotiated up/down 8 steps with bilateral UEs on 1 handrail with CG/min  A, step to step pattern  (ascend with left LE and descend with left LE)  Up/down 4 steps with 1 rail and SAC right ascending with min/mod a step to step  ascent with left LE leading descent with left LE.      Vital Signs Last 24 Hrs  T(C): 36.8 (23 Apr 2025 08:27), Max: 37.1 (22 Apr 2025 20:20)  T(F): 98.3 (23 Apr 2025 08:27), Max: 98.8 (22 Apr 2025 20:20)  HR: 64 (23 Apr 2025 08:54) (64 - 69)  BP: 142/64 (23 Apr 2025 08:27) (142/64 - 149/83)  RR: 16 (23 Apr 2025 08:27) (16 - 16)  SpO2: 94% (23 Apr 2025 08:54) (94% - 98%)    Parameters below as of 23 Apr 2025 08:54  Patient On (Oxygen Delivery Method): room air      PHYSICAL EXAM  Constitutional - No acute distress, Comfortable  Neck - Supple  Cardiovascular - Palpable peripheral pulses   Respiratory/Chest- Symmetrical chest expansion, No dyspnea  Abdomen - Soft, Non-tender  Extremities - No cyanosis, No edema,     Neurologic Exam - Alert, Oriented, Interactive  Sensory - Light touch sensation intact  Motor Function - Moves all extremities spontaneously  Skin -  Left hip incision new Aquacel dsg applied- staples underneath.     LABS:                      10.7   6.92  )-----------( 450      ( 21 Apr 2025 06:59 )             32.4     04-21    138  |  102  |  17  ----------------------------<  117[H]  4.2   |  29  |  1.22    Ca    8.9      21 Apr 2025 06:59    TPro  6.2  /  Alb  2.8[L]  /  TBili  0.5  /  DBili  x   /  AST  21  /  ALT  28  /  AlkPhos  102  04-21    LIVER FUNCTIONS - ( 21 Apr 2025 06:59 )  Alb: 2.8 g/dL / Pro: 6.2 g/dL / ALK PHOS: 102 U/L / ALT: 28 U/L / AST: 21 U/L / GGT: x              Urinalysis Basic - ( 21 Apr 2025 06:59 )    Color: x / Appearance: x / SG: x / pH: x  Gluc: 117 mg/dL / Ketone: x  / Bili: x / Urobili: x   Blood: x / Protein: x / Nitrite: x   Leuk Esterase: x / RBC: x / WBC x   Sq Epi: x / Non Sq Epi: x / Bacteria: x      CAPILLARY BLOOD GLUCOSE    MEDICATIONS  (STANDING):  acetaminophen     Tablet .. 975 milliGRAM(s) Oral every 8 hours  fluticasone propionate/ salmeterol 250-50 MICROgram(s) Diskus 1 Dose(s) Inhalation two times a day  heparin   Injectable 5000 Unit(s) SubCutaneous every 12 hours  lactobacillus acidophilus 1 Tablet(s) Oral two times a day with meals  melatonin 9 milliGRAM(s) Oral at bedtime  oxyCODONE    IR 5 milliGRAM(s) Oral <User Schedule>  pantoprazole    Tablet 40 milliGRAM(s) Oral before breakfast  polyethylene glycol 3350 17 Gram(s) Oral two times a day  senna 2 Tablet(s) Oral at bedtime  valsartan 40 milliGRAM(s) Oral daily    MEDICATIONS  (PRN):  albuterol    90 MICROgram(s) HFA Inhaler 2 Puff(s) Inhalation every 6 hours PRN Shortness of Breath  oxyCODONE    IR 5 milliGRAM(s) Oral every 4 hours PRN Severe Pain (7 - 10)  oxyCODONE    IR 2.5 milliGRAM(s) Oral every 4 hours PRN Moderate Pain (4 - 6)

## 2025-04-24 NOTE — PROGRESS NOTE ADULT - NS ATTEND AMEND GEN_ALL_CORE FT
I have made amendments to the documentation where necessary. Additional comments: I have personally seen and examined the patient independently Medical records reviewed. I have made amendments to the documentation where necessary and adjusted the history, physical examination, and plan as documented by the NP.       Patient slept well  Clinically stable   Pain controlled    Labs unremarkable   Sustained improvement towards meeting therapy goal  Function--ambulating 100ft RW CS 8 stairs with 1 hand rail   Working on endurance    Continue therapy   DVT PPX  Ortho f/u (last seen 4/19) I have made amendments to the documentation where necessary. Additional comments: I have personally seen and examined the patient independently Medical records reviewed. I have made amendments to the documentation where necessary and adjusted the history, physical examination, and plan as documented by the NP.       Patient slept well  Clinically stable   Pain controlled    Labs unremarkable   Sustained improvement towards meeting therapy goal  Function--ambulating 100ft RW CS 8 stairs with 1 hand rail   Working on endurance    Continue therapy --endurance exercises and adherence to hip precautions   DVT PPX  Ortho f/u (last seen 4/19)

## 2025-04-24 NOTE — PROGRESS NOTE ADULT - ASSESSMENT
79M with pMHx of Asthma, HTN, GERD, chronic left foot drop,  h/o asbestosis exposure but no history of COPD- presented to the  ED accompanied by his wife s/p fall in the driveway while using a leaf blower on 4/10. X-ray of hip/femur with Left femoral Neck fracture- s/p Left hemiarthroplasty on 4/11. Not on anticoagulation. Patient with Gait Instability, ADL impairments and Functional impairments.    * SLP -mild cognitive impairment and dysphonia with intermittent vocal hoarseness- all deficits are baseline.   * Improved oral intake and ambulatory distance as noted during IDT, improving  * Improvement with ambulatory distance noted -staples KELSEY underneath- c/d/i, no redness- will confirm with surgical PA when staples need to be removed by ortho team who are following up patient   * FMLA form to be completed for daughter    #  Left femoral Neck fracture- s/p Left hemiarthroplasty on 4/11.  Gait Instability, ADL impairments and Functional impairments  - [facility] of PT/OT  - Posterior hip precautions    #History of Cognitive Deficits  - Await SLP eval - mild cognitive impairment and dysphonia with intermittent vocal hoarseness- all deficits are baseline.     #Chronic Left Foot Drop  -Ankle Foot Othosis, carbon fiber: Daily wear for Foot Drop when OOB  - c/w current temporary AFO from orthotist, while awaiting custom AFO    #Pain control  - Acetaminophen 975 mg q8 hours  -oxyCODONE 5mg PRN q4 hours As needed Severe Pain (7 - 10)    #Asthma/#Asbestosis exposure  -Advair Diskus 250 mcg-50 mcg 1 puff inhaled BID  -Albuterol CFC free 90 mcg/inh inhalation aerosol: 2 puff(s) inhaled every 4 hours, PRN For SOB  -Arformoterol 15 mcg/2 mL inhalation solution:   -Budesonide 0.5 mg/2 mL inhalation suspension: inhaled once a day    #HTN  -Valsartan 40mg daily    #GI/Bowel Mgmt/#GERD  - Continue Senna at bedtime   - Miralax   - Polyethylene glycol BID  - Protonix 40 mg     #Bladder management--voiding     #DVT prophylaxis   - Heparin BID     #Skin:  - Left hip surgical area open to air     FEN   - Diet - DASH Diet    Precautions / PROPHYLAXIS:   - Falls,  - Ortho: Weight bearing status: WBAT   - Lungs: Aspiration  - Pressure injury/Skin: OOB to Chair, PT/OT      HEALTH MAINTEANCE  Hx of cognitive deficits --on f/u with SLP for cognitive assessment  Falls prevention - await Osteoporosis labs   -------------------  liaison with family/providers    4/23-Wife at bedside, participated during therapy, update on treatment discussed   She is happy with patient's sustained functional progress and improved oral intake     IDT 4.24  Function--ambulating 100ft RW CS 8 stairs with 1 hand rail   Left foot drop--pre fabricated AFO left leg, awaiting customized AFO  Working on endurance    --------------------------------------------------------  F/U     Wolfgang Jacome.  Internal Medicine  [address] [place] [address]  Phone: [phone]  Fax: [phone]  Follow Up Time:     Piotr Casanova  Orthopaedic Surgery  [address]  [place] [address]  Phone: [phone]  Fax: [phone]     79M with pMHx of Asthma, HTN, GERD, chronic left foot drop,  h/o asbestosis exposure but no history of COPD- presented to the  ED accompanied by his wife s/p fall in the driveway while using a leaf blower on 4/10. X-ray of hip/femur with Left femoral Neck fracture- s/p Left hemiarthroplasty on 4/11. Not on anticoagulation. Patient with Gait Instability, ADL impairments and Functional impairments.    * SLP -mild cognitive impairment and dysphonia with intermittent vocal hoarseness- all deficits are baseline.   * Improved oral intake and ambulatory distance as noted during IDT, improving  * Improvement with ambulatory distance noted -staples KELSEY underneath- c/d/i, no redness- will confirm with surgical PA when staples need to be removed by ortho team who are following up patient   * FMLA form to be completed for daughter    #  Left femoral Neck fracture- s/p Left hemiarthroplasty on 4/11.  Gait Instability, ADL impairments and Functional impairments  - [facility] PT/OT  - Posterior hip precautions  ortho review 4/19    #History of Cognitive Deficits  - Await SLP eval - mild cognitive impairment and dysphonia with intermittent vocal hoarseness- all deficits are baseline.     #Chronic Left Foot Drop  -Ankle Foot Othosis, carbon fiber: Daily wear for Foot Drop when OOB  - c/w current temporary AFO from orthotist, while awaiting custom AFO    #Pain control  - Acetaminophen 975 mg q8 hours  -oxyCODONE 5mg PRN q4 hours As needed Severe Pain (7 - 10)    #Asthma/#Asbestosis exposure  -Advair Diskus 250 mcg-50 mcg 1 puff inhaled BID  -Albuterol CFC free 90 mcg/inh inhalation aerosol: 2 puff(s) inhaled every 4 hours, PRN For SOB  -Arformoterol 15 mcg/2 mL inhalation solution:   -Budesonide 0.5 mg/2 mL inhalation suspension: inhaled once a day    #HTN  -Valsartan 40mg daily    #GI/Bowel Mgmt/#GERD  - Continue Senna at bedtime   - Miralax   - Polyethylene glycol BID  - Protonix 40 mg     #Bladder management--voiding     #DVT prophylaxis   - Heparin BID     #Skin:  - Left hip surgical area open to air     FEN   - Diet - DASH Diet    Precautions / PROPHYLAXIS:   - Falls,  - Ortho: Weight bearing status: WBAT   - Lungs: Aspiration  - Pressure injury/Skin: OOB to Chair, PT/OT      HEALTH MAINTEANCE  Hx of cognitive deficits --on f/u with SLP for cognitive assessment  Falls prevention - await Osteoporosis labs   -------------------  liaison with family/providers    4/23-Wife at bedside, participated during therapy, update on treatment discussed   She is happy with patient's sustained functional progress and improved oral intake     IDT 4.24  Function--ambulating 100ft RW CS 8 stairs with 1 hand rail   Left foot drop--pre fabricated AFO left leg, awaiting customized AFO  Working on endurance   est dc 4/29    --------------------------------------------------------  F/U     Wolfgang Jacome  Internal Medicine  [address] [place] [address]  Phone: [phone]  Fax: [phone]  Follow Up Time:     Piotr Casanova  Orthopaedic Surgery  [address]  [place] [address]  Phone: [phone]  Fax: [phone]

## 2025-04-24 NOTE — PROGRESS NOTE ADULT - ASSESSMENT
80yo male w/ hx of Asthma, HTN, presented to the ED accompanied by his wife s/p fall in the driveway while using a leaf blower to clean the driveway, noted on imaging to have Left femoral Neck fracture    #Fall  #Left Femoral neck fracture  -s/p L hip hemiarthroplasty 4/11  -Pain control as per rehab team  -management per ortho; WBAT  -PT/OT/PMR eval  -chronic foot drop - per ortho- carbon fiber AFO. Seen by Fab Holder and measurement taken    #HTN  -Valsartan  -monitor BP    #Asthma  #Asbestosis exposure  -Stable on RA  -CXR results noted; Clear lungs  -stopped standing albuterol  -on albuterol inhaler PRN  -changed pulmicort neb to advair BID    #VTE ppx  -HSQ per ortho    4/24 labs reviewed

## 2025-04-24 NOTE — PROGRESS NOTE ADULT - SUBJECTIVE AND OBJECTIVE BOX
CC: Patient is a 79y old  Male who presents with a chief complaint of Left femoral neck fracture s/p left hemiarthroplasty (24 Apr 2025 12:58)      Interval History:    Patient seen and examined at bedside.  No overnight events.  No new complaints.    ALLERGIES:  No Known Allergies    MEDICATIONS  (STANDING):  acetaminophen     Tablet .. 975 milliGRAM(s) Oral every 8 hours  fluticasone propionate/ salmeterol 250-50 MICROgram(s) Diskus 1 Dose(s) Inhalation two times a day  heparin   Injectable 5000 Unit(s) SubCutaneous every 12 hours  lactobacillus acidophilus 1 Tablet(s) Oral two times a day with meals  melatonin 9 milliGRAM(s) Oral at bedtime  oxyCODONE    IR 5 milliGRAM(s) Oral <User Schedule>  pantoprazole    Tablet 40 milliGRAM(s) Oral before breakfast  polyethylene glycol 3350 17 Gram(s) Oral two times a day  senna 2 Tablet(s) Oral at bedtime  valsartan 40 milliGRAM(s) Oral daily    MEDICATIONS  (PRN):  albuterol    90 MICROgram(s) HFA Inhaler 2 Puff(s) Inhalation every 6 hours PRN Shortness of Breath  oxyCODONE    IR 5 milliGRAM(s) Oral every 4 hours PRN Severe Pain (7 - 10)  oxyCODONE    IR 2.5 milliGRAM(s) Oral every 4 hours PRN Moderate Pain (4 - 6)    Vital Signs Last 24 Hrs  T(F): 98.3 (24 Apr 2025 07:52), Max: 98.6 (23 Apr 2025 21:33)  HR: 65 (24 Apr 2025 08:21) (64 - 76)  BP: 152/68 (24 Apr 2025 07:52) (145/62 - 152/68)  RR: 16 (24 Apr 2025 07:52) (16 - 16)  SpO2: 97% (24 Apr 2025 08:21) (94% - 97%)  I&O's Summary        PHYSICAL EXAM:  GENERAL: NAD  CHEST/LUNG: No rales, rhonchi, wheezing; normal respiratory effort  HEART: RRR; No murmurs, rubs, or gallops  ABDOMEN: Soft, Nontender, Nondistended; Bowel sounds present  MSK/EXT:  No peripheral edema, 2+ Peripheral Pulses, No clubbing or digital cyanosis  PSYCH: Appropriate affect, Alert & Oriented    LABS:                        10.3   9.42  )-----------( 468      ( 24 Apr 2025 06:30 )             30.9       04-24    135  |  101  |  18  ----------------------------<  117  4.2   |  27  |  1.17    Ca    8.9      24 Apr 2025 06:30    TPro  6.1  /  Alb  2.8  /  TBili  0.4  /  DBili  x   /  AST  15  /  ALT  18  /  AlkPhos  140  04-24            Urinalysis Basic - ( 24 Apr 2025 06:30 )    Color: x / Appearance: x / SG: x / pH: x  Gluc: 117 mg/dL / Ketone: x  / Bili: x / Urobili: x   Blood: x / Protein: x / Nitrite: x   Leuk Esterase: x / RBC: x / WBC x   Sq Epi: x / Non Sq Epi: x / Bacteria: x        COVID-19 PCR: [place] (04-14-25 @ 17:45)      Care Discussed with Consultants/Other Providers: Yes

## 2025-04-24 NOTE — PROGRESS NOTE ADULT - ASSESSMENT
79M with pMHx of Asthma, HTN, GERD, chronic left foot drop,  h/o asbestosis exposure but no history of COPD- presented to the  ED accompanied by his wife s/p fall in the driveway while using a leaf blower on 4/10. X-ray of hip/femur with Left femoral Neck fracture- s/p Left hemiarthroplasty on 4/11. Not on anticoagulation. Patient with Gait Instability, ADL impairments and Functional impairments.    * Left foot drop - temporary AFO in use - await custom AFO  * SLP -mild cognitive impairment and dysphonia with intermittent vocal hoarseness- all deficits are baseline.   * Improved oral intake and ambulatory distance  * Improvement with ambulatory distance noted -staples KELSEY underneath- c/d/i, no redness- will confirm with surgical PA when staples need to be removed- 4/23.   * FMLA form to be completed for daughter    #  Left femoral Neck fracture- s/p Left hemiarthroplasty on 4/11.  Gait Instability, ADL impairments and Functional impairments  - [facility] PT/OT  - Posterior hip precautions    #History of Cognitive Deficits  - Await SLP eval - mild cognitive impairment and dysphonia with intermittent vocal hoarseness- all deficits are baseline.     #Chronic Left Foot Drop  -Ankle Foot Othosis, carbon fiber: Daily wear for Foot Drop when OOB  - c/w current temporary AFO from orthotist, while awaiting custom AFO    #Pain control  - Acetaminophen 975 mg q8 hours  -oxyCODONE 5mg PRN q4 hours As needed Severe Pain (7 - 10)    #Asthma/#Asbestosis exposure  -Advair Diskus 250 mcg-50 mcg 1 puff inhaled BID  -Albuterol CFC free 90 mcg/inh inhalation aerosol: 2 puff(s) inhaled every 4 hours, PRN For SOB  -Arformoterol 15 mcg/2 mL inhalation solution:   -Budesonide 0.5 mg/2 mL inhalation suspension: inhaled once a day    #HTN  -Valsartan 40mg daily    #GI/Bowel Mgmt/#GERD  - Continue Senna at bedtime   - Miralax   - Polyethylene glycol BID  - Protonix 40 mg     #Bladder management--voiding     #DVT prophylaxis   - Heparin BID     #Skin:  - Left hip surgical area open to air     FEN   - Diet - DASH Diet    Precautions / PROPHYLAXIS:   - Falls,  - Ortho: Weight bearing status: WBAT   - Lungs: Aspiration  - Pressure injury/Skin: OOB to Chair, PT/OT      HEALTH MAINTEANCE  Hx of cognitive deficits --on f/u with SLP for cognitive assessment  Falls prevention - await Osteoporosis labs   -------------------  liaison with family/providers    4/23-Wife at bedside, participated during therapy, update on treatment discussed   She is happy with patient's sustained functional progress and improved oral intake     IDT 4/17  Ambulating 100ft with walker, min assist  Left foot drop--pre fabricated AFO left leg, awaiting customized AFO  Est dc 4/29      --------------------------------------------------------  F/U     Wolfgang Jacome.  Internal Medicine  [address] [place] [address]  Phone: [phone]  Fax: [phone]  Follow Up Time:     Piotr Casanova  Orthopaedic Surgery  [address] [facility]  [place] [address]  Phone: [phone]  Fax: [phone]

## 2025-04-24 NOTE — PROGRESS NOTE ADULT - SUBJECTIVE AND OBJECTIVE BOX
CC: S/p L hip hemiarthroplasty     Today's Subjective & Objective Findings:  Patient seen and examined  Slept well, tolerating oral diet, intake normalized    Temporary L AFO in use, awaiting custom AFO.   SLP eval done - mild cognitive impairment and dysphonia with intermittent vocal hoarseness- all deficits are baseline.     Denies headache, dizziness, visual changes, chest pain, SOB/ROQUE, abdominal pain, nausea, vomiting, diarrhea, dysuria, numbness or tingling.  LBM 4/23. New Left hip Aquacel dsg removed by surgery- staples KELSEY underneath- c/d/i, no redness.     Therapy  Gait training in the parallel bars with 1 bar on right for 4 trials with vc's  for step through gait.    Patient negotiated up/down 8 steps with bilateral UEs on 1 handrail with CG/min  A, step to step pattern  (ascend with left LE and descend with left LE)  Up/down 4 steps with 1 rail and SAC right ascending with min/mod a step to step  ascent with left LE leading descent with left LE.      Vital Signs Last 24 Hrs  T(C): 36.8 (24 Apr 2025 07:52), Max: 37 (23 Apr 2025 21:33)  T(F): 98.3 (24 Apr 2025 07:52), Max: 98.6 (23 Apr 2025 21:33)  HR: 65 (24 Apr 2025 08:21) (64 - 76)  BP: 152/68 (24 Apr 2025 07:52) (145/62 - 152/68)  RR: 16 (24 Apr 2025 07:52) (16 - 16)  SpO2: 97% (24 Apr 2025 08:21) (94% - 97%)  O2 Parameters below as of 24 Apr 2025 08:21  Patient On (Oxygen Delivery Method): room air      PHYSICAL EXAM  Constitutional - No acute distress, Comfortable  Neck - Supple  Cardiovascular - Palpable peripheral pulses   Respiratory/Chest- Symmetrical chest expansion, No dyspnea  Abdomen - Soft, Non-tender  Extremities - No cyanosis, No edema,     Neurologic Exam - Alert, Oriented,   Sensory - Light touch sensation intact  Motor Function - Moves all extremities spontaneously  Skin -  Left hip incision new Aquacel dsg applied- staples underneath.       RECENT LABS/IMAGING                        10.3   9.42  )-----------( 468      ( 24 Apr 2025 06:30 )             30.9     04-24    135  |  101  |  18  ----------------------------<  117[H]  4.2   |  27  |  1.17    Ca    8.9      24 Apr 2025 06:30    TPro  6.1  /  Alb  2.8[L]  /  TBili  0.4  /  DBili  x   /  AST  15  /  ALT  18  /  AlkPhos  140[H]  04-24      Urinalysis Basic - ( 24 Apr 2025 06:30 )    Color: x / Appearance: x / SG: x / pH: x  Gluc: 117 mg/dL / Ketone: x  / Bili: x / Urobili: x   Blood: x / Protein: x / Nitrite: x   Leuk Esterase: x / RBC: x / WBC x   Sq Epi: x / Non Sq Epi: x / Bacteria: x      CAPILLARY BLOOD GLUCOSE    MEDICATIONS  (STANDING):  acetaminophen     Tablet .. 975 milliGRAM(s) Oral every 8 hours  fluticasone propionate/ salmeterol 250-50 MICROgram(s) Diskus 1 Dose(s) Inhalation two times a day  heparin   Injectable 5000 Unit(s) SubCutaneous every 12 hours  lactobacillus acidophilus 1 Tablet(s) Oral two times a day with meals  melatonin 9 milliGRAM(s) Oral at bedtime  oxyCODONE    IR 5 milliGRAM(s) Oral <User Schedule>  pantoprazole    Tablet 40 milliGRAM(s) Oral before breakfast  polyethylene glycol 3350 17 Gram(s) Oral two times a day  senna 2 Tablet(s) Oral at bedtime  valsartan 40 milliGRAM(s) Oral daily    MEDICATIONS  (PRN):  albuterol    90 MICROgram(s) HFA Inhaler 2 Puff(s) Inhalation every 6 hours PRN Shortness of Breath  oxyCODONE    IR 5 milliGRAM(s) Oral every 4 hours PRN Severe Pain (7 - 10)  oxyCODONE    IR 2.5 milliGRAM(s) Oral every 4 hours PRN Moderate Pain (4 - 6)

## 2025-04-25 PROCEDURE — 99233 SBSQ HOSP IP/OBS HIGH 50: CPT

## 2025-04-25 RX ORDER — OXYCODONE HYDROCHLORIDE 30 MG/1
5 TABLET ORAL
Refills: 0 | Status: DISCONTINUED | OUTPATIENT
Start: 2025-04-25 | End: 2025-04-29

## 2025-04-25 RX ORDER — OXYCODONE HYDROCHLORIDE 30 MG/1
2.5 TABLET ORAL EVERY 4 HOURS
Refills: 0 | Status: DISCONTINUED | OUTPATIENT
Start: 2025-04-25 | End: 2025-04-29

## 2025-04-25 RX ORDER — OXYCODONE HYDROCHLORIDE 30 MG/1
5 TABLET ORAL EVERY 4 HOURS
Refills: 0 | Status: DISCONTINUED | OUTPATIENT
Start: 2025-04-25 | End: 2025-04-29

## 2025-04-25 RX ORDER — ACETAMINOPHEN 500 MG/5ML
650 LIQUID (ML) ORAL EVERY 8 HOURS
Refills: 0 | Status: DISCONTINUED | OUTPATIENT
Start: 2025-04-25 | End: 2025-04-29

## 2025-04-25 RX ADMIN — OXYCODONE HYDROCHLORIDE 5 MILLIGRAM(S): 30 TABLET ORAL at 07:10

## 2025-04-25 RX ADMIN — Medication 9 MILLIGRAM(S): at 21:27

## 2025-04-25 RX ADMIN — Medication 650 MILLIGRAM(S): at 12:24

## 2025-04-25 RX ADMIN — OXYCODONE HYDROCHLORIDE 5 MILLIGRAM(S): 30 TABLET ORAL at 08:10

## 2025-04-25 RX ADMIN — HEPARIN SODIUM 5000 UNIT(S): 1000 INJECTION INTRAVENOUS; SUBCUTANEOUS at 17:14

## 2025-04-25 RX ADMIN — Medication 1 DOSE(S): at 21:38

## 2025-04-25 RX ADMIN — Medication 2 TABLET(S): at 21:28

## 2025-04-25 RX ADMIN — Medication 1 TABLET(S): at 17:14

## 2025-04-25 RX ADMIN — Medication 650 MILLIGRAM(S): at 13:24

## 2025-04-25 RX ADMIN — HEPARIN SODIUM 5000 UNIT(S): 1000 INJECTION INTRAVENOUS; SUBCUTANEOUS at 06:24

## 2025-04-25 RX ADMIN — Medication 40 MILLIGRAM(S): at 06:24

## 2025-04-25 RX ADMIN — Medication 1 DOSE(S): at 08:26

## 2025-04-25 RX ADMIN — Medication 650 MILLIGRAM(S): at 21:28

## 2025-04-25 RX ADMIN — Medication 1 TABLET(S): at 07:37

## 2025-04-25 RX ADMIN — Medication 40 MILLIGRAM(S): at 06:23

## 2025-04-25 RX ADMIN — POLYETHYLENE GLYCOL 3350 17 GRAM(S): 17 POWDER, FOR SOLUTION ORAL at 06:26

## 2025-04-25 NOTE — PROGRESS NOTE ADULT - NS ATTEND AMEND GEN_ALL_CORE FT
I have made amendments to the documentation where necessary. Additional comments: I have personally seen and examined the patient independently Medical records reviewed. I have made amendments to the documentation where necessary and adjusted the history, physical examination, and plan as documented by the NP.     Patient slept well  Tolerating oral diet, intake significantly improved    Sustained functional improvement and ROM   Increased ambulatory distance  Adhering to hip precautions         Spent 52 mins, patient review, observation in therapy, discussion of update with daughter and care co ordination

## 2025-04-25 NOTE — PROGRESS NOTE ADULT - SUBJECTIVE AND OBJECTIVE BOX
CC: S/p L hip hemiarthroplasty     Today's Subjective & Objective Findings:  Patient seen and examined  Slept well, tolerating oral diet, intake normalized  Temporary L AFO in use, awaiting custom AFO. To be delivered today per orthotist.   No complaints at this time.     Denies headache, dizziness, visual changes, chest pain, SOB/ROQUE, abdominal pain, nausea, vomiting, diarrhea, dysuria, numbness or tingling.  LBM 4/24. New Left hip Aquacel dsg removed by surgery- staples KELSEY underneath- c/d/i, no redness.     Therapy  Gait training in the parallel bars with 1 bar on right for 4 trials with vc's  for step through gait.    Patient negotiated up/down 8 steps with bilateral UEs on 1 handrail with CG/min  A, step to step pattern  (ascend with left LE and descend with left LE)  Up/down 4 steps with 1 rail and SAC right ascending with min/mod a step to step  ascent with left LE leading descent with left LE.      Vital Signs Last 24 Hrs  T(C): 36.7 (25 Apr 2025 07:33), Max: 36.7 (24 Apr 2025 20:35)  T(F): 98.1 (25 Apr 2025 07:33), Max: 98.1 (25 Apr 2025 07:33)  HR: 65 (25 Apr 2025 08:26) (63 - 72)  BP: 153/72 (25 Apr 2025 07:33) (146/66 - 153/72)  BP(mean): --  RR: 16 (25 Apr 2025 07:33) (16 - 16)  SpO2: 97% (25 Apr 2025 08:26) (95% - 97%)      PHYSICAL EXAM  Constitutional - No acute distress, Comfortable  Neck - Supple  Cardiovascular - Palpable peripheral pulses   Respiratory/Chest- Symmetrical chest expansion, No dyspnea  Abdomen - Soft, Non-tender  Extremities - No cyanosis, No edema,     Neurologic Exam - Alert, Oriented,   Sensory - Light touch sensation intact  Motor Function - Moves all extremities spontaneously  Skin -  Left hip incision new Aquacel dsg applied- staples underneath.       RECENT LABS/IMAGING                        10.3   9.42  )-----------( 468      ( 24 Apr 2025 06:30 )             30.9     04-24    135  |  101  |  18  ----------------------------<  117[H]  4.2   |  27  |  1.17    Ca    8.9      24 Apr 2025 06:30    TPro  6.1  /  Alb  2.8[L]  /  TBili  0.4  /  DBili  x   /  AST  15  /  ALT  18  /  AlkPhos  140[H]  04-24      Urinalysis Basic - ( 24 Apr 2025 06:30 )    Color: x / Appearance: x / SG: x / pH: x  Gluc: 117 mg/dL / Ketone: x  / Bili: x / Urobili: x   Blood: x / Protein: x / Nitrite: x   Leuk Esterase: x / RBC: x / WBC x   Sq Epi: x / Non Sq Epi: x / Bacteria: x      CAPILLARY BLOOD GLUCOSE      MEDICATIONS  (STANDING):  acetaminophen     Tablet .. 650 milliGRAM(s) Oral every 8 hours  fluticasone propionate/ salmeterol 250-50 MICROgram(s) Diskus 1 Dose(s) Inhalation two times a day  heparin   Injectable 5000 Unit(s) SubCutaneous every 12 hours  lactobacillus acidophilus 1 Tablet(s) Oral two times a day with meals  melatonin 9 milliGRAM(s) Oral at bedtime  oxyCODONE    IR 5 milliGRAM(s) Oral <User Schedule>  pantoprazole    Tablet 40 milliGRAM(s) Oral before breakfast  polyethylene glycol 3350 17 Gram(s) Oral two times a day  senna 2 Tablet(s) Oral at bedtime  valsartan 40 milliGRAM(s) Oral daily    MEDICATIONS  (PRN):  albuterol    90 MICROgram(s) HFA Inhaler 2 Puff(s) Inhalation every 6 hours PRN Shortness of Breath  oxyCODONE    IR 5 milliGRAM(s) Oral every 4 hours PRN Severe Pain (7 - 10)  oxyCODONE    IR 2.5 milliGRAM(s) Oral every 4 hours PRN Moderate Pain (4 - 6)   CC: S/p L hip hemiarthroplasty     Today's Subjective & Objective Findings:  Patient seen and examined  Slept well, tolerating oral diet, intake normalized  Temporary L AFO in use, awaiting custom AFO. To be delivered today per orthotist.   No complaints at this time.     Denies headache, dizziness, visual changes, chest pain, SOB/ROQUE, abdominal pain, nausea, vomiting, diarrhea, dysuria, numbness or tingling.  LBM 4/24. New Left hip Aquacel dsg removed by surgery- staples KELSEY underneath- c/d/i, no redness.     Therapy  Gait Training  Pt ambulated with  feet close supervision antalgic gait pattern 3 point  gait, increased UE weight bearing, Cues increased step length +left AFO    Pt negotiates 12 steps with 2 hands on one rail, step to pattern, cues for  sequencing CG step to step  Patient maintains hip precautions well without crossing legs while ascending  with left LE and descending with right LE  Ramp: ascend and descend 5ft with RAMP  Platform step: 8 inch step with RW      Vital Signs Last 24 Hrs  T(C): 36.7 (25 Apr 2025 07:33), Max: 36.7 (24 Apr 2025 20:35)  T(F): 98.1 (25 Apr 2025 07:33), Max: 98.1 (25 Apr 2025 07:33)  HR: 65 (25 Apr 2025 08:26) (63 - 72)  BP: 153/72 (25 Apr 2025 07:33) (146/66 - 153/72)  RR: 16 (25 Apr 2025 07:33) (16 - 16)  SpO2: 97% (25 Apr 2025 08:26) (95% - 97%)      PHYSICAL EXAM  Constitutional - No acute distress, Comfortable  Neck - Supple  Cardiovascular - Palpable peripheral pulses   Respiratory/Chest- Symmetrical chest expansion, No dyspnea  Abdomen - Soft, Non-tender  Extremities - No cyanosis, No edema,     Neurologic Exam - Alert, Oriented,   Sensory - Light touch sensation intact  Motor Function - Moves all extremities spontaneously  Skin -  Left hip incision new Aquacel dsg applied- staples underneath.       RECENT LABS/IMAGING                        10.3   9.42  )-----------( 468      ( 24 Apr 2025 06:30 )             30.9     04-24    135  |  101  |  18  ----------------------------<  117[H]  4.2   |  27  |  1.17    Ca    8.9      24 Apr 2025 06:30    TPro  6.1  /  Alb  2.8[L]  /  TBili  0.4  /  DBili  x   /  AST  15  /  ALT  18  /  AlkPhos  140[H]  04-24      Urinalysis Basic - ( 24 Apr 2025 06:30 )    Color: x / Appearance: x / SG: x / pH: x  Gluc: 117 mg/dL / Ketone: x  / Bili: x / Urobili: x   Blood: x / Protein: x / Nitrite: x   Leuk Esterase: x / RBC: x / WBC x   Sq Epi: x / Non Sq Epi: x / Bacteria: x      CAPILLARY BLOOD GLUCOSE      MEDICATIONS  (STANDING):  acetaminophen     Tablet .. 650 milliGRAM(s) Oral every 8 hours  fluticasone propionate/ salmeterol 250-50 MICROgram(s) Diskus 1 Dose(s) Inhalation two times a day  heparin   Injectable 5000 Unit(s) SubCutaneous every 12 hours  lactobacillus acidophilus 1 Tablet(s) Oral two times a day with meals  melatonin 9 milliGRAM(s) Oral at bedtime  oxyCODONE    IR 5 milliGRAM(s) Oral <User Schedule>  pantoprazole    Tablet 40 milliGRAM(s) Oral before breakfast  polyethylene glycol 3350 17 Gram(s) Oral two times a day  senna 2 Tablet(s) Oral at bedtime  valsartan 40 milliGRAM(s) Oral daily    MEDICATIONS  (PRN):  albuterol    90 MICROgram(s) HFA Inhaler 2 Puff(s) Inhalation every 6 hours PRN Shortness of Breath  oxyCODONE    IR 5 milliGRAM(s) Oral every 4 hours PRN Severe Pain (7 - 10)  oxyCODONE    IR 2.5 milliGRAM(s) Oral every 4 hours PRN Moderate Pain (4 - 6)

## 2025-04-25 NOTE — CHART NOTE - NSCHARTNOTEFT_GEN_A_CORE
Carbon fiber afo delivered. foorplate trimmed top fit inside shoe gear.  PT will begin gait training   Follow up if needed    Fab DANIELS  [phone]  [employer]

## 2025-04-25 NOTE — PROGRESS NOTE ADULT - ASSESSMENT
79M with pMHx of Asthma, HTN, GERD, chronic left foot drop,  h/o asbestosis exposure but no history of COPD- presented to the  ED accompanied by his wife s/p fall in the driveway while using a leaf blower on 4/10. X-ray of hip/femur with Left femoral Neck fracture- s/p Left hemiarthroplasty on 4/11. Not on anticoagulation. Patient with Gait Instability, ADL impairments and Functional impairments.    * SLP -mild cognitive impairment and dysphonia with intermittent vocal hoarseness- all deficits are baseline.   * Improved oral intake and ambulatory distance as noted during IDT, improving  * Improvement with ambulatory distance noted -staples KELSEY underneath- c/d/i, no redness- staples to be removed on 4/28   * FMLA form to be completed for daughter    #  Left femoral Neck fracture- s/p Left hemiarthroplasty on 4/11.  Gait Instability, ADL impairments and Functional impairments  - [facility] PT/OT  - Posterior hip precautions  ortho review 4/19  - Staples to be removed on 4/28    #History of Cognitive Deficits  - Await SLP eval - mild cognitive impairment and dysphonia with intermittent vocal hoarseness- all deficits are baseline.     #Chronic Left Foot Drop  -Ankle Foot Othosis, carbon fiber: Daily wear for Foot Drop when OOB  - c/w current temporary AFO from orthotist, while awaiting custom AFO    #Pain control  - Acetaminophen reduced to 650 mg q8 hours  -oxyCODONE 5mg PRN q4 hours As needed Severe Pain (7 - 10)    #Asthma/#Asbestosis exposure  -Advair Diskus 250 mcg-50 mcg 1 puff inhaled BID  -Albuterol CFC free 90 mcg/inh inhalation aerosol: 2 puff(s) inhaled every 4 hours, PRN For SOB  -Arformoterol 15 mcg/2 mL inhalation solution:   -Budesonide 0.5 mg/2 mL inhalation suspension: inhaled once a day    #HTN  -Valsartan 40mg daily    #GI/Bowel Mgmt/#GERD  - Continue Senna at bedtime   - Miralax   - Polyethylene glycol BID  - Protonix 40 mg     #Bladder management--voiding     #DVT prophylaxis   - Heparin BID     #Skin:  - Left hip surgical area open to air     FEN   - Diet - DASH Diet    Precautions / PROPHYLAXIS:   - Falls,  - Ortho: Weight bearing status: WBAT   - Lungs: Aspiration  - Pressure injury/Skin: OOB to Chair, PT/OT      HEALTH MAINTEANCE  Hx of cognitive deficits --on f/u with SLP for cognitive assessment  Falls prevention - await Osteoporosis labs   -------------------  liaison with family/providers    4/23-Wife at bedside, participated during therapy, update on treatment discussed   She is happy with patient's sustained functional progress and improved oral intake   -------------------  IDT 4.24  Function--ambulating 100ft RW CS 8 stairs with 1 hand rail   Left foot drop--pre fabricated AFO left leg, awaiting customized AFO  Working on endurance   est dc 4/29  --------------------------------------------------------  F/U     Wolfgang Jacome  Internal Medicine  [address] [place] [address]  Phone: [phone]  Fax: [phone]  Follow Up Time:     Piotr Casanova  Orthopaedic Surgery  [address] [facility]  [place] [address]  Phone: [phone]  Fax: [phone]     79M with pMHx of Asthma, HTN, GERD, chronic left foot drop,  h/o asbestosis exposure but no history of COPD- presented to the  ED accompanied by his wife s/p fall in the driveway while using a leaf blower on 4/10. X-ray of hip/femur with Left femoral Neck fracture- s/p Left hemiarthroplasty on 4/11. Not on anticoagulation. Patient with Gait Instability, ADL impairments and Functional impairments.    * Continue functional improvement  * Improvement with ambulatory distance noted -staples KELSEY underneath- c/d/i, no redness- staples to be removed on 4/28   * FMLA form  completed and update d/w daughter    #  Left femoral Neck fracture- s/p Left hemiarthroplasty on 4/11.  Gait Instability, ADL impairments and Functional impairments  - [facility] of PT/OT  - Posterior hip precautions  ortho review 4/19  - Staples to be removed on 4/28    #History of Cognitive Deficits  - Await SLP eval - mild cognitive impairment and dysphonia with intermittent vocal hoarseness- all deficits are baseline.     #Chronic Left Foot Drop  -Ankle Foot Othosis, carbon fiber: Daily wear for Foot Drop when OOB  - c/w current temporary AFO from orthotist, while awaiting custom AFO    #Pain control  - Acetaminophen reduced to 650 mg q8 hours  -oxyCODONE 5mg PRN q4 hours As needed Severe Pain (7 - 10)    #Asthma/#Asbestosis exposure  -Advair Diskus 250 mcg-50 mcg 1 puff inhaled BID  -Albuterol CFC free 90 mcg/inh inhalation aerosol: 2 puff(s) inhaled every 4 hours, PRN For SOB  -Arformoterol 15 mcg/2 mL inhalation solution:   -Budesonide 0.5 mg/2 mL inhalation suspension: inhaled once a day    #HTN  -Valsartan 40mg daily    #GI/Bowel Mgmt/#GERD  - Continue Senna at bedtime   - Miralax   - Polyethylene glycol BID  - Protonix 40 mg     #Bladder management--voiding     #DVT prophylaxis   - Heparin BID     #Skin:  - Left hip surgical area open to air     FEN   - Diet - DASH Diet    Precautions / PROPHYLAXIS:   - Falls,  - Ortho: Weight bearing status: WBAT   - Lungs: Aspiration  - Pressure injury/Skin: OOB to Chair, PT/OT      HEALTH MAINTEANCE  Hx of cognitive deficits --on f/u with SLP for cognitive assessment  Falls prevention - await Osteoporosis labs   -------------------  liaison with family/providers    4/25- I called on ph and d/w daughter Ms Amaya, patient's functional progress  Informed her that FMLA form has been completed  She requested a letter stating her involvement in patient's care to facilitate her, in taking time off duties to care for patrient  -------------------  IDT 4.24  Function--ambulating 100ft RW CS 8 stairs with 1 hand rail   Left foot drop--pre fabricated AFO left leg, awaiting customized AFO  Working on endurance   est dc 4/29  --------------------------------------------------------  F/U     Wolfgang Jacome  Internal Medicine  [address] [place] [address]  Phone: [phone]  Fax: [phone]  Follow Up Time:     Piotr Casanova  Orthopaedic Surgery  [address] [facility]  [place] [address]  Phone: [phone]  Fax: [phone]

## 2025-04-25 NOTE — PROGRESS NOTE ADULT - NS ATTEND OPT1 GEN_ALL_CORE
I independently performed the documented:
I attest my time as attending is greater than 50% of the total combined time spent on qualifying patient care activities by the PA/NP and attending.
I independently performed the documented:

## 2025-04-26 PROCEDURE — 99232 SBSQ HOSP IP/OBS MODERATE 35: CPT

## 2025-04-26 RX ADMIN — Medication 1 DOSE(S): at 21:23

## 2025-04-26 RX ADMIN — Medication 2 PUFF(S): at 08:41

## 2025-04-26 RX ADMIN — Medication 650 MILLIGRAM(S): at 16:10

## 2025-04-26 RX ADMIN — Medication 1 DOSE(S): at 08:41

## 2025-04-26 RX ADMIN — Medication 40 MILLIGRAM(S): at 06:11

## 2025-04-26 RX ADMIN — OXYCODONE HYDROCHLORIDE 5 MILLIGRAM(S): 30 TABLET ORAL at 07:31

## 2025-04-26 RX ADMIN — Medication 1 TABLET(S): at 18:35

## 2025-04-26 RX ADMIN — HEPARIN SODIUM 5000 UNIT(S): 1000 INJECTION INTRAVENOUS; SUBCUTANEOUS at 06:10

## 2025-04-26 RX ADMIN — Medication 650 MILLIGRAM(S): at 20:36

## 2025-04-26 RX ADMIN — Medication 9 MILLIGRAM(S): at 20:37

## 2025-04-26 RX ADMIN — Medication 650 MILLIGRAM(S): at 15:14

## 2025-04-26 RX ADMIN — HEPARIN SODIUM 5000 UNIT(S): 1000 INJECTION INTRAVENOUS; SUBCUTANEOUS at 18:35

## 2025-04-26 RX ADMIN — Medication 40 MILLIGRAM(S): at 06:10

## 2025-04-26 RX ADMIN — Medication 650 MILLIGRAM(S): at 06:10

## 2025-04-26 RX ADMIN — Medication 1 TABLET(S): at 08:47

## 2025-04-26 NOTE — PROGRESS NOTE ADULT - SUBJECTIVE AND OBJECTIVE BOX
Pt. seen and examined at bedside.  No overnight events.      REVIEW OF SYSTEMS  Constitutional - No fever,  No fatigue  Neurological - No headaches, ++ loss of strength  Musculoskeletal - ++ left hip joint pain, No joint swelling, No muscle pain    VITALS  T(C): 36.8 (04-26-25 @ 08:50), Max: 36.8 (04-26-25 @ 08:50)  HR: 79 (04-26-25 @ 08:50) (67 - 79)  BP: 155/62 (04-26-25 @ 08:50) (151/73 - 159/75)  RR: 16 (04-26-25 @ 08:50) (16 - 16)  SpO2: 94% (04-26-25 @ 08:50) (94% - 95%)  Wt(kg): --       MEDICATIONS   acetaminophen     Tablet .. 650 milliGRAM(s) every 8 hours  albuterol    90 MICROgram(s) HFA Inhaler 2 Puff(s) every 6 hours PRN  fluticasone propionate/ salmeterol 250-50 MICROgram(s) Diskus 1 Dose(s) two times a day  heparin   Injectable 5000 Unit(s) every 12 hours  lactobacillus acidophilus 1 Tablet(s) two times a day with meals  melatonin 9 milliGRAM(s) at bedtime  oxyCODONE    IR 5 milliGRAM(s) <User Schedule>  oxyCODONE    IR 5 milliGRAM(s) every 4 hours PRN  oxyCODONE    IR 2.5 milliGRAM(s) every 4 hours PRN  pantoprazole    Tablet 40 milliGRAM(s) before breakfast  polyethylene glycol 3350 17 Gram(s) two times a day  senna 2 Tablet(s) at bedtime  valsartan 40 milliGRAM(s) daily      RECENT LABS/IMAGING                        ---------  PHYSICAL EXAM  Constitutional - NAD, Comfortable  Pulm - Breathing comfortably, No wheezing  Abd - Soft, NTND  Left Hip - incision C/D/I  Extremities - No edema, No calf tenderness  Neurologic Exam -                    Cognitive - Awake, Alert     Communication - Fluent     Motor - No focal deficits     Sensory - Intact to LT  Psychiatric - Mood WNL, Affect WNL    ASSESSMENT/PLAN  79y Male with functional deficits 2' left femoral neck fracture s/p left hemiarthroplasty.  Continue current medical management  Pain - Tylenol PRN; oxycodone prn  DVT PPX - heparin   Injectable 5000 Unit(s) every 12 hours  Active issues - none  Continue 3hrs a day of comprehensive rehab program.

## 2025-04-26 NOTE — PROGRESS NOTE ADULT - SUBJECTIVE AND OBJECTIVE BOX
Interval History  Patient seen and examined at bedside. No acute events noted.  Patient reports feeling well, no pain. ROS are negative.    ALLERGIES:  No Known Allergies    MEDICATIONS  (STANDING):  acetaminophen     Tablet .. 650 milliGRAM(s) Oral every 8 hours  fluticasone propionate/ salmeterol 250-50 MICROgram(s) Diskus 1 Dose(s) Inhalation two times a day  heparin   Injectable 5000 Unit(s) SubCutaneous every 12 hours  lactobacillus acidophilus 1 Tablet(s) Oral two times a day with meals  melatonin 9 milliGRAM(s) Oral at bedtime  oxyCODONE    IR 5 milliGRAM(s) Oral <User Schedule>  pantoprazole    Tablet 40 milliGRAM(s) Oral before breakfast  polyethylene glycol 3350 17 Gram(s) Oral two times a day  senna 2 Tablet(s) Oral at bedtime  valsartan 40 milliGRAM(s) Oral daily    MEDICATIONS  (PRN):  albuterol    90 MICROgram(s) HFA Inhaler 2 Puff(s) Inhalation every 6 hours PRN Shortness of Breath  oxyCODONE    IR 5 milliGRAM(s) Oral every 4 hours PRN Severe Pain (7 - 10)  oxyCODONE    IR 2.5 milliGRAM(s) Oral every 4 hours PRN Moderate Pain (4 - 6)    Vital Signs Last 24 Hrs  T(F): 98.2 (26 Apr 2025 08:50), Max: 98.2 (26 Apr 2025 08:50)  HR: 79 (26 Apr 2025 08:50) (67 - 79)  BP: 155/62 (26 Apr 2025 08:50) (151/73 - 159/75)  RR: 16 (26 Apr 2025 08:50) (16 - 16)  SpO2: 94% (26 Apr 2025 08:50) (94% - 95%)  I&O's Summary    PHYSICAL EXAM:  GENERAL: NAD  HEENT: NCAT  CHEST/LUNG: Clear to percussion bilaterally; No rales, rhonchi, wheezing  HEART: Regular rate and rhythm  ABDOMEN: Soft, Nontender, Nondistended; Bowel sounds present  MUSCULOSKELETAL/EXTREMITIES:  2+ Peripheral Pulses, No LE edema  Left hip incision healing well   PSYCH: Appropriate affect  NEURO: Alert & Oriented x 3    I personally reviewed the below data/images/labs:    LABS:                        10.3   9.42  )-----------( 468      ( 24 Apr 2025 06:30 )             30.9       04-24    135  |  101  |  18  ----------------------------<  117  4.2   |  27  |  1.17    Ca    8.9      24 Apr 2025 06:30    TPro  6.1  /  Alb  2.8  /  TBili  0.4  /  DBili  x   /  AST  15  /  ALT  18  /  AlkPhos  140  04-24    Urinalysis Basic - ( 24 Apr 2025 06:30 )    Color: x / Appearance: x / SG: x / pH: x  Gluc: 117 mg/dL / Ketone: x  / Bili: x / Urobili: x   Blood: x / Protein: x / Nitrite: x   Leuk Esterase: x / RBC: x / WBC x   Sq Epi: x / Non Sq Epi: x / Bacteria: x    COVID-19 PCR: NotDetec (04-14-25 @ 17:45)    Consultant(s) Notes Reviewed:   Care Discused with Consultants/Other Providers:  Imaging Personally Reviewed:

## 2025-04-26 NOTE — PROGRESS NOTE ADULT - ASSESSMENT
78yo male w/ hx of Asthma, HTN, presented to the ED accompanied by his wife s/p fall in the driveway while using a leaf blower to clean the driveway, noted on imaging to have Left femoral Neck fracture    #Status Post Fall   #Left Femoral neck fracture s/p L hip hemiarthroplasty 4/11  -WBAT LLE  -DVT PPx  -Fall precaution  -Pain control and bowel regimen as per rehab team  -Comprehensive rehab program with PT/OT  -Chronic foot drop - per ortho- carbon fiber AFO. Seen by Fab Holder and measurement taken  -Outpatient follow up with orthopedics     #HTN  -Continue Valsartan  -Monitor BP    #Asthma  #Asbestosis exposure  -Stable on RA  -CXR results noted; Clear lungs  -Changed pulmicort neb to advair BID  -Continue albuterol PRN     #GI PPx  -PPI    #VTE ppx  -HSQ per ortho    Discussed with rehab team

## 2025-04-27 PROCEDURE — 99232 SBSQ HOSP IP/OBS MODERATE 35: CPT

## 2025-04-27 RX ADMIN — Medication 1 DOSE(S): at 21:37

## 2025-04-27 RX ADMIN — Medication 1 TABLET(S): at 08:49

## 2025-04-27 RX ADMIN — OXYCODONE HYDROCHLORIDE 2.5 MILLIGRAM(S): 30 TABLET ORAL at 22:05

## 2025-04-27 RX ADMIN — Medication 40 MILLIGRAM(S): at 05:18

## 2025-04-27 RX ADMIN — HEPARIN SODIUM 5000 UNIT(S): 1000 INJECTION INTRAVENOUS; SUBCUTANEOUS at 05:18

## 2025-04-27 RX ADMIN — HEPARIN SODIUM 5000 UNIT(S): 1000 INJECTION INTRAVENOUS; SUBCUTANEOUS at 18:30

## 2025-04-27 RX ADMIN — Medication 9 MILLIGRAM(S): at 23:10

## 2025-04-27 RX ADMIN — OXYCODONE HYDROCHLORIDE 2.5 MILLIGRAM(S): 30 TABLET ORAL at 21:05

## 2025-04-27 RX ADMIN — Medication 650 MILLIGRAM(S): at 05:18

## 2025-04-27 RX ADMIN — Medication 1 TABLET(S): at 18:33

## 2025-04-27 NOTE — PROGRESS NOTE ADULT - SUBJECTIVE AND OBJECTIVE BOX
Pt. seen and examined at bedside.  No overnight events.      REVIEW OF SYSTEMS  Constitutional - No fever,  No fatigue  Neurological - No headaches, No loss of strength  Musculoskeletal - ++ hip joint pain, No joint swelling, No muscle pain    VITALS  T(C): 36.3 (04-27-25 @ 08:53), Max: 36.9 (04-26-25 @ 20:31)  HR: 76 (04-27-25 @ 08:53) (65 - 76)  BP: 158/71 (04-27-25 @ 08:53) (137/72 - 158/71)  RR: 16 (04-27-25 @ 08:53) (16 - 16)  SpO2: 95% (04-27-25 @ 08:53) (95% - 95%)  Wt(kg): --       MEDICATIONS   acetaminophen     Tablet .. 650 milliGRAM(s) every 8 hours  albuterol    90 MICROgram(s) HFA Inhaler 2 Puff(s) every 6 hours PRN  fluticasone propionate/ salmeterol 250-50 MICROgram(s) Diskus 1 Dose(s) two times a day  heparin   Injectable 5000 Unit(s) every 12 hours  lactobacillus acidophilus 1 Tablet(s) two times a day with meals  melatonin 9 milliGRAM(s) at bedtime  oxyCODONE    IR 2.5 milliGRAM(s) every 4 hours PRN  oxyCODONE    IR 5 milliGRAM(s) <User Schedule>  oxyCODONE    IR 5 milliGRAM(s) every 4 hours PRN  pantoprazole    Tablet 40 milliGRAM(s) before breakfast  polyethylene glycol 3350 17 Gram(s) two times a day  senna 2 Tablet(s) at bedtime  valsartan 40 milliGRAM(s) daily      RECENT LABS/IMAGING                        ---------  PHYSICAL EXAM  Constitutional - NAD, Comfortable  Pulm - Breathing comfortably, No wheezing  Abd - Soft, NTND  Hip - incision C/D/I  Extremities - No edema, No calf tenderness  Neurologic Exam -                    Cognitive - Awake, Alert     Communication - Fluent     Motor - No focal deficits     Sensory - Intact to LT  Psychiatric - Mood WNL, Affect WNL    ASSESSMENT/PLAN  79y Male with functional deficits hip IT fx s/p ORIF.    Continue current medical management  Pain - Tylenol PRN; oxycodone prn.  DVT PPX - heparin   Injectable 5000 Unit(s) every 12 hours  Active issues - none  Continue 3hrs a day of comprehensive rehab program.        Pt. seen and examined at bedside.  No overnight events.      REVIEW OF SYSTEMS  Constitutional - No fever,  No fatigue  Neurological - No headaches, No loss of strength  Musculoskeletal - ++ left hip joint pain, No joint swelling, No muscle pain    VITALS  T(C): 36.3 (04-27-25 @ 08:53), Max: 36.9 (04-26-25 @ 20:31)  HR: 76 (04-27-25 @ 08:53) (65 - 76)  BP: 158/71 (04-27-25 @ 08:53) (137/72 - 158/71)  RR: 16 (04-27-25 @ 08:53) (16 - 16)  SpO2: 95% (04-27-25 @ 08:53) (95% - 95%)  Wt(kg): --       MEDICATIONS   acetaminophen     Tablet .. 650 milliGRAM(s) every 8 hours  albuterol    90 MICROgram(s) HFA Inhaler 2 Puff(s) every 6 hours PRN  fluticasone propionate/ salmeterol 250-50 MICROgram(s) Diskus 1 Dose(s) two times a day  heparin   Injectable 5000 Unit(s) every 12 hours  lactobacillus acidophilus 1 Tablet(s) two times a day with meals  melatonin 9 milliGRAM(s) at bedtime  oxyCODONE    IR 2.5 milliGRAM(s) every 4 hours PRN  oxyCODONE    IR 5 milliGRAM(s) <User Schedule>  oxyCODONE    IR 5 milliGRAM(s) every 4 hours PRN  pantoprazole    Tablet 40 milliGRAM(s) before breakfast  polyethylene glycol 3350 17 Gram(s) two times a day  senna 2 Tablet(s) at bedtime  valsartan 40 milliGRAM(s) daily      RECENT LABS/IMAGING                        ---------  PHYSICAL EXAM  Constitutional - NAD, Comfortable  Pulm - Breathing comfortably, No wheezing  Abd - Soft, NTND  Left Hip - incision C/D/I  Extremities - No edema, No calf tenderness  Neurologic Exam -                    Cognitive - Awake, Alert     Communication - Fluent     Motor - No focal deficits     Sensory - Intact to LT  Psychiatric - Mood WNL, Affect WNL    ASSESSMENT/PLAN  79y Male with functional deficits left hip IT fx s/p ORIF.    Continue current medical management  Pain - Tylenol PRN; oxycodone prn.  DVT PPX - heparin   Injectable 5000 Unit(s) every 12 hours  Active issues - none  Continue 3hrs a day of comprehensive rehab program.

## 2025-04-27 NOTE — PROGRESS NOTE ADULT - ASSESSMENT
80yo male w/ hx of Asthma, HTN, presented to the ED accompanied by his wife s/p fall in the driveway while using a leaf blower to clean the driveway, noted on imaging to have Left femoral Neck fracture    #Status Post Fall   #Left Femoral neck fracture s/p L hip hemiarthroplasty 4/11  -WBAT LLE  -DVT PPx  -Fall precaution  -Pain control and bowel regimen as per rehab team  -Comprehensive rehab program with PT/OT  -Chronic foot drop - per ortho- carbon fiber AFO. Seen by Fab Holder and measurement taken  -Outpatient follow up with orthopedics     #HTN  -Continue Valsartan  -Monitor BP    #Asthma  #Asbestosis exposure  -Stable on RA  -CXR results noted; Clear lungs  -Changed pulmicort neb to advair BID  -Continue albuterol PRN     #GI PPx  -PPI    #VTE ppx  -HSQ per ortho    Discussed with rehab team

## 2025-04-27 NOTE — PROGRESS NOTE ADULT - SUBJECTIVE AND OBJECTIVE BOX
Interval History  Patient seen and examined at bedside. No acute events noted.  Patient reports feeling well, pain is controlled. No other complaints.     ALLERGIES:  No Known Allergies    MEDICATIONS  (STANDING):  acetaminophen     Tablet .. 650 milliGRAM(s) Oral every 8 hours  fluticasone propionate/ salmeterol 250-50 MICROgram(s) Diskus 1 Dose(s) Inhalation two times a day  heparin   Injectable 5000 Unit(s) SubCutaneous every 12 hours  lactobacillus acidophilus 1 Tablet(s) Oral two times a day with meals  melatonin 9 milliGRAM(s) Oral at bedtime  oxyCODONE    IR 5 milliGRAM(s) Oral <User Schedule>  pantoprazole    Tablet 40 milliGRAM(s) Oral before breakfast  polyethylene glycol 3350 17 Gram(s) Oral two times a day  senna 2 Tablet(s) Oral at bedtime  valsartan 40 milliGRAM(s) Oral daily    MEDICATIONS  (PRN):  albuterol    90 MICROgram(s) HFA Inhaler 2 Puff(s) Inhalation every 6 hours PRN Shortness of Breath  oxyCODONE    IR 2.5 milliGRAM(s) Oral every 4 hours PRN Moderate Pain (4 - 6)  oxyCODONE    IR 5 milliGRAM(s) Oral every 4 hours PRN Severe Pain (7 - 10)    Vital Signs Last 24 Hrs  T(F): 97.4 (27 Apr 2025 08:53), Max: 98.4 (26 Apr 2025 20:31)  HR: 76 (27 Apr 2025 08:53) (65 - 76)  BP: 158/71 (27 Apr 2025 08:53) (137/72 - 158/71)  RR: 16 (27 Apr 2025 08:53) (16 - 16)  SpO2: 95% (27 Apr 2025 08:53) (95% - 95%)  I&O's Summary    PHYSICAL EXAM:  GENERAL: NAD  HEENT: NCAT  CHEST/LUNG: Clear to percussion bilaterally; No rales, rhonchi, wheezing  HEART: Regular rate and rhythm  ABDOMEN: Soft, Nontender, Nondistended; Bowel sounds present  MUSCULOSKELETAL/EXTREMITIES:  2+ Peripheral Pulses, No LE edema  Left hip incision healing well   PSYCH: Appropriate affect  NEURO: Alert & Oriented x 3    I personally reviewed the below data/images/labs:    LABS:    COVID-19 PCR: [place] (04-14-25 @ 17:45)    Consultant(s) Notes Reviewed:   Care Discused with Consultants/Other Providers:  Imaging Personally Reviewed:

## 2025-04-28 ENCOUNTER — TRANSCRIPTION ENCOUNTER (OUTPATIENT)
Age: 80
End: 2025-04-28

## 2025-04-28 LAB
ALBUMIN SERPL ELPH-MCNC: 3.1 G/DL — LOW (ref 3.3–5)
ALP SERPL-CCNC: 122 U/L — HIGH (ref 40–120)
ALT FLD-CCNC: 17 U/L — SIGNIFICANT CHANGE UP (ref 10–45)
ANION GAP SERPL CALC-SCNC: 7 MMOL/L — SIGNIFICANT CHANGE UP (ref 5–17)
AST SERPL-CCNC: 16 U/L — SIGNIFICANT CHANGE UP (ref 10–40)
BASOPHILS # BLD AUTO: 0.06 K/UL — SIGNIFICANT CHANGE UP (ref 0–0.2)
BASOPHILS NFR BLD AUTO: 0.8 % — SIGNIFICANT CHANGE UP (ref 0–2)
BILIRUB SERPL-MCNC: 0.4 MG/DL — SIGNIFICANT CHANGE UP (ref 0.2–1.2)
BUN SERPL-MCNC: 17 MG/DL — SIGNIFICANT CHANGE UP (ref 7–23)
CALCIUM SERPL-MCNC: 9 MG/DL — SIGNIFICANT CHANGE UP (ref 8.4–10.5)
CHLORIDE SERPL-SCNC: 99 MMOL/L — SIGNIFICANT CHANGE UP (ref 96–108)
CO2 SERPL-SCNC: 27 MMOL/L — SIGNIFICANT CHANGE UP (ref 22–31)
CREAT SERPL-MCNC: 1.22 MG/DL — SIGNIFICANT CHANGE UP (ref 0.5–1.3)
EGFR: 60 ML/MIN/1.73M2 — SIGNIFICANT CHANGE UP
EGFR: 60 ML/MIN/1.73M2 — SIGNIFICANT CHANGE UP
EOSINOPHIL # BLD AUTO: 0.41 K/UL — SIGNIFICANT CHANGE UP (ref 0–0.5)
EOSINOPHIL NFR BLD AUTO: 5.6 % — SIGNIFICANT CHANGE UP (ref 0–6)
GLUCOSE SERPL-MCNC: 106 MG/DL — HIGH (ref 70–99)
HCT VFR BLD CALC: 33.9 % — LOW (ref 39–50)
HGB BLD-MCNC: 10.9 G/DL — LOW (ref 13–17)
IMM GRANULOCYTES NFR BLD AUTO: 0.7 % — SIGNIFICANT CHANGE UP (ref 0–0.9)
LYMPHOCYTES # BLD AUTO: 1.35 K/UL — SIGNIFICANT CHANGE UP (ref 1–3.3)
LYMPHOCYTES # BLD AUTO: 18.4 % — SIGNIFICANT CHANGE UP (ref 13–44)
MCHC RBC-ENTMCNC: 29.5 PG — SIGNIFICANT CHANGE UP (ref 27–34)
MCHC RBC-ENTMCNC: 32.2 G/DL — SIGNIFICANT CHANGE UP (ref 32–36)
MCV RBC AUTO: 91.9 FL — SIGNIFICANT CHANGE UP (ref 80–100)
MONOCYTES # BLD AUTO: 0.79 K/UL — SIGNIFICANT CHANGE UP (ref 0–0.9)
MONOCYTES NFR BLD AUTO: 10.8 % — SIGNIFICANT CHANGE UP (ref 2–14)
NEUTROPHILS # BLD AUTO: 4.66 K/UL — SIGNIFICANT CHANGE UP (ref 1.8–7.4)
NEUTROPHILS NFR BLD AUTO: 63.7 % — SIGNIFICANT CHANGE UP (ref 43–77)
NRBC BLD AUTO-RTO: 0 /100 WBCS — SIGNIFICANT CHANGE UP (ref 0–0)
PLATELET # BLD AUTO: 496 K/UL — HIGH (ref 150–400)
POTASSIUM SERPL-MCNC: 3.8 MMOL/L — SIGNIFICANT CHANGE UP (ref 3.5–5.3)
POTASSIUM SERPL-SCNC: 3.8 MMOL/L — SIGNIFICANT CHANGE UP (ref 3.5–5.3)
PROT SERPL-MCNC: 6.5 G/DL — SIGNIFICANT CHANGE UP (ref 6–8.3)
RBC # BLD: 3.69 M/UL — LOW (ref 4.2–5.8)
RBC # FLD: 14 % — SIGNIFICANT CHANGE UP (ref 10.3–14.5)
SODIUM SERPL-SCNC: 133 MMOL/L — LOW (ref 135–145)
WBC # BLD: 7.32 K/UL — SIGNIFICANT CHANGE UP (ref 3.8–10.5)
WBC # FLD AUTO: 7.32 K/UL — SIGNIFICANT CHANGE UP (ref 3.8–10.5)

## 2025-04-28 PROCEDURE — 99232 SBSQ HOSP IP/OBS MODERATE 35: CPT

## 2025-04-28 PROCEDURE — 99233 SBSQ HOSP IP/OBS HIGH 50: CPT

## 2025-04-28 RX ADMIN — Medication 2 PUFF(S): at 19:51

## 2025-04-28 RX ADMIN — Medication 9 MILLIGRAM(S): at 21:05

## 2025-04-28 RX ADMIN — OXYCODONE HYDROCHLORIDE 2.5 MILLIGRAM(S): 30 TABLET ORAL at 17:32

## 2025-04-28 RX ADMIN — OXYCODONE HYDROCHLORIDE 2.5 MILLIGRAM(S): 30 TABLET ORAL at 21:05

## 2025-04-28 RX ADMIN — HEPARIN SODIUM 5000 UNIT(S): 1000 INJECTION INTRAVENOUS; SUBCUTANEOUS at 17:26

## 2025-04-28 RX ADMIN — Medication 40 MILLIGRAM(S): at 06:13

## 2025-04-28 RX ADMIN — Medication 2 PUFF(S): at 08:31

## 2025-04-28 RX ADMIN — OXYCODONE HYDROCHLORIDE 5 MILLIGRAM(S): 30 TABLET ORAL at 07:02

## 2025-04-28 RX ADMIN — Medication 1 DOSE(S): at 19:51

## 2025-04-28 RX ADMIN — Medication 650 MILLIGRAM(S): at 21:05

## 2025-04-28 RX ADMIN — Medication 2 TABLET(S): at 21:05

## 2025-04-28 RX ADMIN — OXYCODONE HYDROCHLORIDE 2.5 MILLIGRAM(S): 30 TABLET ORAL at 21:35

## 2025-04-28 RX ADMIN — Medication 1 TABLET(S): at 17:26

## 2025-04-28 RX ADMIN — Medication 1 DOSE(S): at 08:31

## 2025-04-28 RX ADMIN — Medication 650 MILLIGRAM(S): at 21:35

## 2025-04-28 RX ADMIN — HEPARIN SODIUM 5000 UNIT(S): 1000 INJECTION INTRAVENOUS; SUBCUTANEOUS at 06:12

## 2025-04-28 RX ADMIN — Medication 1 TABLET(S): at 08:53

## 2025-04-28 NOTE — PROGRESS NOTE ADULT - ASSESSMENT
79M with pMHx of Asthma, HTN, GERD, chronic left foot drop,  h/o asbestosis exposure but no history of COPD- presented to the  ED accompanied by his wife s/p fall in the driveway while using a leaf blower on 4/10. X-ray of hip/femur with Left femoral Neck fracture- s/p Left hemiarthroplasty on 4/11. Not on anticoagulation. Patient with Gait Instability, ADL impairments and Functional impairments.    * Continue functional improvement, ambulating increasing distance  *Ortho team following patient care, informed about dc tomorrow, they plan to remove staples prior to dc tomorrow   * Left hip staples in situ   * Discussed treatment update and dc plan with daughter    #  Left femoral Neck fracture- s/p Left hemiarthroplasty on 4/11.  Gait Instability, ADL impairments and Functional impairments  - Commence Comprehensive Rehab Program of PT/OT  - Posterior hip precautions  ortho review 4/19  - Staples to be removed on 4/29 or post dc     #History of Cognitive Deficits  - Await SLP eval - mild cognitive impairment and dysphonia with intermittent vocal hoarseness- all deficits are baseline.     #Chronic Left Foot Drop  -Ankle Foot Othosis, carbon fiber: Daily wear for Foot Drop when OOB  - c/w current temporary AFO from orthotist, while awaiting custom AFO    #Pain control  - Acetaminophen reduced to 650 mg q8 hours  -OxyCODONE 5mg PRN q4 hours As needed Severe Pain (7 - 10)    #Asthma/#Asbestosis exposure  -Advair Diskus 250 mcg-50 mcg 1 puff inhaled BID  -Albuterol CFC free 90 mcg/inh inhalation aerosol: 2 puff(s) inhaled every 4 hours, PRN For SOB  -Arformoterol 15 mcg/2 mL inhalation solution:   -Budesonide 0.5 mg/2 mL inhalation suspension: inhaled once a day    #HTN  -Valsartan 40mg daily    #GI/Bowel Mgmt/#GERD  - Continue Senna at bedtime   - Miralax   - Polyethylene glycol BID  - Protonix 40 mg     #Bladder management--voiding     #DVT prophylaxis   - Heparin BID     #Skin:  - Left hip surgical area open to air     FEN   - Diet - DASH Diet    Precautions / PROPHYLAXIS:   - Falls,  - Ortho: Weight bearing status: WBAT   - Lungs: Aspiration  - Pressure injury/Skin: OOB to Chair, PT/OT      HEALTH MAINTEANCE  Hx of cognitive deficits --on f/u with SLP for cognitive assessment  Falls prevention - await Osteoporosis labs   -------------------  liaison with family/providers    4/28- I met daughter Ms Amaya and d/w her patient's functional progress, dc tomorrow and post dc follow up  -------------------  IDT 4.24  Function--ambulating 100ft RW CS 8 stairs with 1 hand rail   Left foot drop--pre fabricated AFO left leg, awaiting customized AFO  Working on endurance   est dc 4/29  --------------------------------------------------------  F/U     Wolfgang Jacome  Internal Medicine  [address] [place] [address]  Phone: [phone]  Fax: [phone]  Follow Up Time:     Piotr Casanova  Orthopaedic Surgery  [address]  [place] [address]  Phone: [phone]  Fax: [phone]    Spent 53 mins, patient review, discussion of test results, update with ortho, discussion of treatment and functional progres/dc plan with family

## 2025-04-28 NOTE — DISCHARGE NOTE NURSING/CASE MANAGEMENT/SOCIAL WORK - NSDCPEFALRISK_GEN_ALL_CORE
For information on Fall & Injury Prevention, visit: [email or web address] OR  [email or web address] OR  https://www.cdc.gov/steadi/patient.html

## 2025-04-28 NOTE — DISCHARGE NOTE NURSING/CASE MANAGEMENT/SOCIAL WORK - PATIENT PORTAL LINK FT
You can access the FollowMyHealth Patient Portal offered by [facility] by registering at the following website: [email or web address]. By joining [employer] [facility]’s FollowMyHealth portal, you will also be able to view your health information using other applications (apps) compatible with our system.

## 2025-04-28 NOTE — CHART NOTE - NSCHARTNOTEFT_GEN_A_CORE
Nutrition Follow Up Note  Source: Medical Record [X] Patient [x] Family [ ]         Diet: Regular, DASH/TLC, Ensure Plus High Protein (provides 350 kcal, 20 g protein/serving)   Pt tolerating diet with fair-good PO intake, eating % of meals Per nursing flowsheets, although pt reports suboptimal PO intake/appetite. Discussed food preferences to optimize PO intake. Encouraged consumption of Ensure Plus High Protein (provides 350 kcal, 20 g protein/serving). Denies nausea, vomiting, diarrhea, constipation. Pt denies chewing/swallowing difficulties.     Enteral/Parenteral Nutrition: N/A    Weight: 170.8 lbs (4/14)    Pertinent Medications: MEDICATIONS  (STANDING):  acetaminophen     Tablet .. 650 milliGRAM(s) Oral every 8 hours  fluticasone propionate/ salmeterol 250-50 MICROgram(s) Diskus 1 Dose(s) Inhalation two times a day  heparin   Injectable 5000 Unit(s) SubCutaneous every 12 hours  lactobacillus acidophilus 1 Tablet(s) Oral two times a day with meals  melatonin 9 milliGRAM(s) Oral at bedtime  oxyCODONE    IR 5 milliGRAM(s) Oral <User Schedule>  pantoprazole    Tablet 40 milliGRAM(s) Oral before breakfast  polyethylene glycol 3350 17 Gram(s) Oral two times a day  senna 2 Tablet(s) Oral at bedtime  valsartan 40 milliGRAM(s) Oral daily    MEDICATIONS  (PRN):  albuterol    90 MICROgram(s) HFA Inhaler 2 Puff(s) Inhalation every 6 hours PRN Shortness of Breath  oxyCODONE    IR 5 milliGRAM(s) Oral every 4 hours PRN Severe Pain (7 - 10)  oxyCODONE    IR 2.5 milliGRAM(s) Oral every 4 hours PRN Moderate Pain (4 - 6)      Pertinent Labs:  04-28 Na133 mmol/L[L] Glu 106 mg/dL[H] K+ 3.8 mmol/L Cr  1.22 mg/dL BUN 17 mg/dL 04-28 Alb 3.1 g/dL[L]        Skin: surgical incision per nursing flow sheets     Edema: No edema per nursing flow sheets     Last BM: on 4/26 Per nursing flowsheets     Estimated Needs:   [X] No Change since Previous Assessment  [ ] Recalculated:     Previous Nutrition Diagnosis:   Moderate malnutrition    Nutrition Diagnosis is [X] Ongoing  - addressed with Ensure Plus High Protein (provides 350 kcal, 20 g protein/serving) daily    New Nutrition Diagnosis: [X] Not Applicable  [ ] Inadequate Protein Energy Intake   [ ] Inadequate Oral Intake   [ ] Excessive Energy Intake   [ ] Increased Nutrient Needs   [ ] Obesity   [ ] Altered GI Function   [ ] Unintended Weight Loss   [ ] Food & Nutrition Related Knowledge Deficit  [ ] Limited Adherence to nutrition related recommendations   [ ] Malnutrition      Interventions:   1. Recommend continuing with current plan of care  2. Encourage PO intake  3. Obtain and honor food preferences as able      Monitoring & Evaluation:   [X] PO Intake   [X] Follow Up (Per Protocol)  [X] Tolerance to Diet Prescription   [X] Other: Labs    RD Remains Available.  Valeria Benites RD

## 2025-04-28 NOTE — PROGRESS NOTE ADULT - SUBJECTIVE AND OBJECTIVE BOX
CC: S/p L hip hemiarthroplasty     Today's Subjective & Objective Findings:  Patient seen and examined, and subsequently observed in therapy,  Discussed update treatment and dc plan with daughter during her visit to the rehab unit  Patient reports good night rest  No acute pain    Denies headache, dizziness, visual changes, chest pain, SOB/ROQUE, abdominal pain, nausea, vomiting, diarrhea, dysuria, numbness or tingling.  LBM 4/27.     Therapy  Pt ambulated with  feet close supervision antalgic gait pattern 3 point  gait, increased UE weight bearing, Cues increased step length +left AFO  Pt negotiates 12 steps with 2 hands on one rail, step to pattern, cues for  sequencing CG step to step  Patient maintains hip precautions well without crossing legs while ascending  with left LE and descending with right LE      Vital Signs Last 24 Hrs  T(C): 36.9 (28 Apr 2025 08:56), Max: 36.9 (28 Apr 2025 08:56)  T(F): 98.5 (28 Apr 2025 08:56), Max: 98.5 (28 Apr 2025 08:56)  HR: 77 (28 Apr 2025 08:56) (61 - 77)  BP: 148/64 (28 Apr 2025 08:56) (142/70 - 148/64)  RR: 16 (28 Apr 2025 08:56) (16 - 16)  SpO2: 95% (28 Apr 2025 08:56) (95% - 95%)  O2 Parameters below as of 28 Apr 2025 08:56  Patient On (Oxygen Delivery Method): room air      PHYSICAL EXAM  Constitutional -  Comfortable  Neck - Supple  Cardiovascular - Palpable peripheral pulses   Respiratory/Chest- Symmetrical chest expansion, No dyspnea  Abdomen - Soft, Non-tender  Extremities - No cyanosis, No edema,     Neurologic Exam - Alert, Oriented,   Sensory - Light touch sensation intact  Motor Function - Moves all extremities spontaneously  Skin -  Left hip incision new Aquacel dsg applied- staples underneath.       RECENT LABS/IMAGING                        10.9   7.32  )-----------( 496      ( 28 Apr 2025 07:42 )             33.9     04-28    133[L]  |  99  |  17  ----------------------------<  106[H]  3.8   |  27  |  1.22    Ca    9.0      28 Apr 2025 07:42    TPro  6.5  /  Alb  3.1[L]  /  TBili  0.4  /  DBili  x   /  AST  16  /  ALT  17  /  AlkPhos  122[H]  04-28      Urinalysis Basic - ( 28 Apr 2025 07:42 )    Color: x / Appearance: x / SG: x / pH: x  Gluc: 106 mg/dL / Ketone: x  / Bili: x / Urobili: x   Blood: x / Protein: x / Nitrite: x   Leuk Esterase: x / RBC: x / WBC x   Sq Epi: x / Non Sq Epi: x / Bacteria: x    MEDICATIONS  (STANDING):  acetaminophen     Tablet .. 650 milliGRAM(s) Oral every 8 hours  fluticasone propionate/ salmeterol 250-50 MICROgram(s) Diskus 1 Dose(s) Inhalation two times a day  heparin   Injectable 5000 Unit(s) SubCutaneous every 12 hours  lactobacillus acidophilus 1 Tablet(s) Oral two times a day with meals  melatonin 9 milliGRAM(s) Oral at bedtime  oxyCODONE    IR 5 milliGRAM(s) Oral <User Schedule>  pantoprazole    Tablet 40 milliGRAM(s) Oral before breakfast  polyethylene glycol 3350 17 Gram(s) Oral two times a day  senna 2 Tablet(s) Oral at bedtime  valsartan 40 milliGRAM(s) Oral daily    MEDICATIONS  (PRN):  albuterol    90 MICROgram(s) HFA Inhaler 2 Puff(s) Inhalation every 6 hours PRN Shortness of Breath  oxyCODONE    IR 5 milliGRAM(s) Oral every 4 hours PRN Severe Pain (7 - 10)  oxyCODONE    IR 2.5 milliGRAM(s) Oral every 4 hours PRN Moderate Pain (4 - 6)

## 2025-04-28 NOTE — DISCHARGE NOTE NURSING/CASE MANAGEMENT/SOCIAL WORK - FINANCIAL ASSISTANCE
[facility] provides services at a reduced cost to those who are determined to be eligible through [facility]’s financial assistance program. Information regarding [facility]’s financial assistance program can be found by going to [email or web address] or by calling [phone].

## 2025-04-28 NOTE — PROGRESS NOTE ADULT - SUBJECTIVE AND OBJECTIVE BOX
Interval History  Patient seen and examined at bedside. No acute events noted.  Patient endorsed left hip pain overnight, improved with oxycodone.   No other complaints this morning.     ALLERGIES:  No Known Allergies    MEDICATIONS  (STANDING):  acetaminophen     Tablet .. 650 milliGRAM(s) Oral every 8 hours  fluticasone propionate/ salmeterol 250-50 MICROgram(s) Diskus 1 Dose(s) Inhalation two times a day  heparin   Injectable 5000 Unit(s) SubCutaneous every 12 hours  lactobacillus acidophilus 1 Tablet(s) Oral two times a day with meals  melatonin 9 milliGRAM(s) Oral at bedtime  oxyCODONE    IR 5 milliGRAM(s) Oral <User Schedule>  pantoprazole    Tablet 40 milliGRAM(s) Oral before breakfast  polyethylene glycol 3350 17 Gram(s) Oral two times a day  senna 2 Tablet(s) Oral at bedtime  valsartan 40 milliGRAM(s) Oral daily    MEDICATIONS  (PRN):  albuterol    90 MICROgram(s) HFA Inhaler 2 Puff(s) Inhalation every 6 hours PRN Shortness of Breath  oxyCODONE    IR 5 milliGRAM(s) Oral every 4 hours PRN Severe Pain (7 - 10)  oxyCODONE    IR 2.5 milliGRAM(s) Oral every 4 hours PRN Moderate Pain (4 - 6)    Vital Signs Last 24 Hrs  T(F): 98.5 (28 Apr 2025 08:56), Max: 98.5 (28 Apr 2025 08:56)  HR: 77 (28 Apr 2025 08:56) (61 - 77)  BP: 148/64 (28 Apr 2025 08:56) (142/70 - 148/64)  RR: 16 (28 Apr 2025 08:56) (16 - 16)  SpO2: 95% (28 Apr 2025 08:56) (95% - 95%)  I&O's Summary    PHYSICAL EXAM:  GENERAL: NAD  HEENT: NCAT  CHEST/LUNG: Clear to percussion bilaterally; No rales, rhonchi, wheezing  HEART: Regular rate and rhythm  ABDOMEN: Soft, Nontender, Nondistended; Bowel sounds present  MUSCULOSKELETAL/EXTREMITIES:  2+ Peripheral Pulses, No LE edema  Left hip incision healing well   PSYCH: Appropriate affect  NEURO: Alert & Oriented x 3    I personally reviewed the below data/images/labs:    LABS:                        10.9   7.32  )-----------( 496      ( 28 Apr 2025 07:42 )             33.9       04-28    133  |  99  |  17  ----------------------------<  106  3.8   |  27  |  1.22    Ca    9.0      28 Apr 2025 07:42    TPro  6.5  /  Alb  3.1  /  TBili  0.4  /  DBili  x   /  AST  16  /  ALT  17  /  AlkPhos  122  04-28                                  Urinalysis Basic - ( 28 Apr 2025 07:42 )    Color: x / Appearance: x / SG: x / pH: x  Gluc: 106 mg/dL / Ketone: x  / Bili: x / Urobili: x   Blood: x / Protein: x / Nitrite: x   Leuk Esterase: x / RBC: x / WBC x   Sq Epi: x / Non Sq Epi: x / Bacteria: x        COVID-19 PCR: NotDetec (04-14-25 @ 17:45)      Consultant(s) Notes Reviewed:   Care Discused with Consultants/Other Providers:  Imaging Personally Reviewed:

## 2025-04-29 ENCOUNTER — TRANSCRIPTION ENCOUNTER (OUTPATIENT)
Age: 80
End: 2025-04-29

## 2025-04-29 VITALS
SYSTOLIC BLOOD PRESSURE: 137 MMHG | OXYGEN SATURATION: 97 % | TEMPERATURE: 98 F | HEART RATE: 70 BPM | DIASTOLIC BLOOD PRESSURE: 58 MMHG | RESPIRATION RATE: 16 BRPM

## 2025-04-29 DIAGNOSIS — Z87.39 PERSONAL HISTORY OF OTHER DISEASES OF THE MUSCULOSKELETAL SYSTEM AND CONNECTIVE TISSUE: ICD-10-CM

## 2025-04-29 PROCEDURE — 99239 HOSP IP/OBS DSCHRG MGMT >30: CPT

## 2025-04-29 PROCEDURE — 99232 SBSQ HOSP IP/OBS MODERATE 35: CPT

## 2025-04-29 RX ORDER — SENNA 187 MG
2 TABLET ORAL
Qty: 0 | Refills: 0 | DISCHARGE
Start: 2025-04-29

## 2025-04-29 RX ORDER — LACTOBACILLUS ACIDOPHILUS/PECT 75 MM-100
1 CAPSULE ORAL
Qty: 0 | Refills: 0 | DISCHARGE
Start: 2025-04-29

## 2025-04-29 RX ORDER — ARFORMOTEROL TARTRATE 15 UG/2ML
2 SOLUTION RESPIRATORY (INHALATION)
Qty: 30 | Refills: 0
Start: 2025-04-29 | End: 2025-05-28

## 2025-04-29 RX ORDER — MELATONIN 5 MG
3 TABLET ORAL
Qty: 0 | Refills: 0 | DISCHARGE
Start: 2025-04-29

## 2025-04-29 RX ORDER — OXYCODONE HYDROCHLORIDE 30 MG/1
1 TABLET ORAL
Qty: 21 | Refills: 0
Start: 2025-04-29 | End: 2025-05-05

## 2025-04-29 RX ORDER — BUDESONIDE 0.25 MG/2ML
2 SUSPENSION RESPIRATORY (INHALATION)
Qty: 30 | Refills: 0
Start: 2025-04-29 | End: 2025-05-28

## 2025-04-29 RX ORDER — ACETAMINOPHEN 500 MG/5ML
2 LIQUID (ML) ORAL
Qty: 0 | Refills: 0 | DISCHARGE
Start: 2025-04-29

## 2025-04-29 RX ORDER — POLYETHYLENE GLYCOL 3350 17 G/17G
17 POWDER, FOR SOLUTION ORAL
Qty: 0 | Refills: 0 | DISCHARGE
Start: 2025-04-29

## 2025-04-29 RX ADMIN — Medication 40 MILLIGRAM(S): at 06:17

## 2025-04-29 RX ADMIN — Medication 1 DOSE(S): at 08:43

## 2025-04-29 RX ADMIN — HEPARIN SODIUM 5000 UNIT(S): 1000 INJECTION INTRAVENOUS; SUBCUTANEOUS at 06:17

## 2025-04-29 RX ADMIN — Medication 1 TABLET(S): at 08:45

## 2025-04-29 RX ADMIN — Medication 2 PUFF(S): at 08:45

## 2025-04-29 RX ADMIN — Medication 650 MILLIGRAM(S): at 06:17

## 2025-04-29 RX ADMIN — OXYCODONE HYDROCHLORIDE 5 MILLIGRAM(S): 30 TABLET ORAL at 06:18

## 2025-04-29 NOTE — CHART NOTE - NSCHARTNOTEFT_GEN_A_CORE
NP contacted daughter Monique at 12:04 via phone number in chart regarding post-operative DVT ppx upon discharge.  Primary surgeon, Dr. Casanova, recommends Aspirin 325mg daily.   All questions and concerns addressed.

## 2025-04-29 NOTE — DISCHARGE NOTE PROVIDER - CARE PROVIDERS DIRECT ADDRESSES
[email or web address]

## 2025-04-29 NOTE — PROGRESS NOTE ADULT - NUTRITIONAL ASSESSMENT
This patient has been assessed with a concern for Malnutrition and has been determined to have a diagnosis/diagnoses of Moderate protein-calorie malnutrition.    This patient is being managed with:   Diet Regular-  DASH/TLC {Sodium & Cholesterol Restricted}  Supplement Feeding Modality:  Oral  Ensure Plus High Protein Cans or Servings Per Day:  1       Frequency:  Daily  Entered: Apr 15 2025  3:03PM  

## 2025-04-29 NOTE — PROGRESS NOTE ADULT - ASSESSMENT
79 year old male w/ hx of Asthma, HTN, presented to the ED accompanied by his wife s/p fall in the driveway while using a leaf blower to clean the driveway, noted on imaging to have Left femoral Neck fracture    #Status Post Fall   #Left Femoral neck fracture s/p L Hip Hemiarthroplasty 4/11  -WBAT LLE  -DVT PPx  -Fall precaution  -Pain control and bowel regimen as per rehab team  -Comprehensive rehab program with PT/OT  -Chronic foot drop - per ortho- carbon fiber AFO - in place   -Outpatient follow up with orthopedics (Dr. Casanova)    #Post-Op Anemia  -Hb 14 on 4/10, has been ~10 post-op  -Follow up with PMD on discharge     #HTN  -Continue Valsartan  -Monitor BP    #Asthma  #Asbestosis exposure  -Stable on RA  -CXR results noted; Clear lungs  -Changed pulmicort neb to advair BID while inpatient  -Continue albuterol PRN   -Resume home regimen on discharge     #GI PPx  -PPI    #VTE ppx  -HSQ while inpatient, DVT ppx on discharge per ortho team     Discussed with rehab team

## 2025-04-29 NOTE — DISCHARGE NOTE PROVIDER - HOSPITAL COURSE
HPI:  79M with PMHx of Asthma, HTN, GERD, chronic left foot drop,  h/o asbestosis exposure but no history of COPD, Right TKR, & Left TKR on 2014 (Dr. Pimentel). Presented to the  ED accompanied by his wife s/p fall in the driveway while using a leaf blower on 4/10. Denies LOC or syncopal episode. X-ray of hip/femur with Left femoral Neck fracture- s/p Left hemiarthroplasty on 4/11. Not on anticoagulation. Patient was evaluated by PM&R and therapy for functional deficits, gait/ADL impairments and acute rehabilitation was recommended. Patient was cleared for discharge to [facility] IRU on 4/14/25.        (14 Apr 2025 13:34)      Patient was evaluated by PM&R and therapy for gait/ADL impairments and recommended acute rehabilitation. Patient was medically optimized for discharge to [facility] on 4/14/25. Admitted with gait instability, ADL, and functional impairments.     Rehab course significant for constipation, and pain management.    All other medical co-morbidities were stable. Patient tolerated course of inpatient PT/OT/SLP rehab with significant improvements and met rehab goals prior to discharge. Patient was medically cleared on 4/29/25 for discharge to home. HPI:  79M with PMHx of Asthma, HTN, GERD, chronic left foot drop,  h/o asbestosis exposure but no history of COPD, Right TKR, & Left TKR on 2014 (Dr. Pimentel). Presented to the  ED accompanied by his wife s/p fall in the driveway while using a leaf blower on 4/10. Denies LOC or syncopal episode. X-ray of hip/femur with Left femoral Neck fracture- s/p Left hemiarthroplasty on 4/11. Not on anticoagulation. Patient was evaluated by PM&R and therapy for functional deficits, gait/ADL impairments and acute rehabilitation was recommended. Patient was cleared for discharge to [facility] IRU on 4/14/25.        (14 Apr 2025 13:34)    Patient was evaluated by PM&R and therapy for gait/ADL impairments and recommended acute rehabilitation. Patient was medically optimized for discharge to [facility] on 4/14/25. Admitted with gait instability, ADL, and functional impairments.     Rehab course significant for constipation, and pain management.    You made sustained progress in therapy, achieved functional distance 150ft with walker  Your daughter got regular updates on your treatment   Ortho team was involved in your care    All other medical co-morbidities were stable. Patient tolerated course of inpatient PT/OT/SLP rehab with significant improvements and met rehab goals prior to discharge. Patient was medically stable 4/29/25 for discharge to home.   HPI:  79M with PMHx of Asthma, HTN, GERD, chronic left foot drop,  h/o asbestosis exposure but no history of COPD, Right TKR, & Left TKR on 2014 (Dr. Pimentel). Presented to the  ED accompanied by his wife s/p fall in the driveway while using a leaf blower on 4/10. Denies LOC or syncopal episode. X-ray of hip/femur with Left femoral Neck fracture- s/p Left hemiarthroplasty on 4/11. Not on anticoagulation. Patient was evaluated by PM&R and therapy for functional deficits, gait/ADL impairments and acute rehabilitation was recommended. Patient was cleared for discharge to [facility] IRU on 4/14/25.        (14 Apr 2025 13:34)    Patient was evaluated by PM&R and therapy for gait/ADL impairments and recommended acute rehabilitation. Patient was medically optimized for discharge to [facility] on 4/14/25. Admitted with gait instability, ADL, and functional impairments.     Rehab course significant for constipation, and pain management.    You made sustained progress in therapy, achieved functional distance 150ft with walker  Your daughter got regular updates on your treatment   Ortho team was involved in your care, and removed staples on 4/28  Due f/u with ortho team Dr Casanova's team tomorrow 4/30 during which they will make decision on post op dvt ppx      IDT 4.24  Function--ambulating 100ft RW CS 8 stairs with 1 hand rail   Left foot drop--pre fabricated AFO left leg, awaiting customized AFO  Working on endurance   est dc 4/29      All other medical co-morbidities were stable. Patient tolerated course of inpatient PT/OT/SLP rehab with significant improvements and met rehab goals prior to discharge. Patient was medically stable 4/29/25 for discharge to home.

## 2025-04-29 NOTE — DISCHARGE NOTE PROVIDER - NSDCFUADDAPPT_GEN_ALL_CORE_FT
Please follow up with your PCP as soon as possible.    If you are in need of a PCP or a specialist in your area: contact the [facility] Physician Referral Service at [phone].

## 2025-04-29 NOTE — PROGRESS NOTE ADULT - PROVIDER SPECIALTY LIST ADULT
Hospitalist
Rehab Medicine
Hospitalist
Hospitalist
Physiatry
Rehab Medicine
Hospitalist
Orthopedics
Rehab Medicine
Hospitalist
Orthopedics
Physiatry
Rehab Medicine
Hospitalist
Rehab Medicine
Hospitalist
Rehab Medicine
Hospitalist

## 2025-04-29 NOTE — DISCHARGE NOTE PROVIDER - NSDCMRMEDTOKEN_GEN_ALL_CORE_FT
acetaminophen 325 mg oral tablet: 2 tab(s) orally every 8 hours  arformoterol 15 mcg/2 mL inhalation solution: 2 milliliter(s) inhaled once a day Please continue this medication as you were taking it at home  budesonide 0.5 mg/2 mL inhalation suspension: 2 milliliter(s) inhaled once a day inhaled once a day  lactobacillus acidophilus oral capsule: 1 tab(s) orally 2 times a day  melatonin 3 mg oral tablet: 3 tab(s) orally once a day (at bedtime)  oxyCODONE 5 mg oral tablet: 1 tab(s) orally every 8 hours as needed for  severe pain MDD: 3  pantoprazole 40 mg oral delayed release tablet: 1 tab(s) orally once a day (before a meal)  polyethylene glycol 3350 oral powder for reconstitution: 17 gram(s) orally 2 times a day  senna leaf extract oral tablet: 2 tab(s) orally once a day (at bedtime)  valsartan 40 mg oral tablet: 1 tab(s) orally once a day

## 2025-04-29 NOTE — PROGRESS NOTE ADULT - SUBJECTIVE AND OBJECTIVE BOX
Interval History  Patient seen and examined at bedside. No acute events noted.  Patient slept well, denied any acute complaints this morning. Pain is controlled.    ALLERGIES:  No Known Allergies    MEDICATIONS  (STANDING):  acetaminophen     Tablet .. 650 milliGRAM(s) Oral every 8 hours  fluticasone propionate/ salmeterol 250-50 MICROgram(s) Diskus 1 Dose(s) Inhalation two times a day  heparin   Injectable 5000 Unit(s) SubCutaneous every 12 hours  lactobacillus acidophilus 1 Tablet(s) Oral two times a day with meals  melatonin 9 milliGRAM(s) Oral at bedtime  oxyCODONE    IR 5 milliGRAM(s) Oral <User Schedule>  pantoprazole    Tablet 40 milliGRAM(s) Oral before breakfast  polyethylene glycol 3350 17 Gram(s) Oral two times a day  senna 2 Tablet(s) Oral at bedtime  valsartan 40 milliGRAM(s) Oral daily    MEDICATIONS  (PRN):  albuterol    90 MICROgram(s) HFA Inhaler 2 Puff(s) Inhalation every 6 hours PRN Shortness of Breath  oxyCODONE    IR 2.5 milliGRAM(s) Oral every 4 hours PRN Moderate Pain (4 - 6)  oxyCODONE    IR 5 milliGRAM(s) Oral every 4 hours PRN Severe Pain (7 - 10)    Vital Signs Last 24 Hrs  T(F): 98.7 (28 Apr 2025 19:56), Max: 98.7 (28 Apr 2025 19:56)  HR: 74 (29 Apr 2025 08:45) (74 - 78)  BP: 128/70 (29 Apr 2025 06:14) (125/67 - 128/70)  RR: 16 (29 Apr 2025 06:14) (16 - 16)  SpO2: 97% (29 Apr 2025 08:45) (94% - 97%)  I&O's Summary    PHYSICAL EXAM:  GENERAL: NAD  HEENT: NCAT  CHEST/LUNG: Clear to percussion bilaterally; No rales, rhonchi, wheezing  HEART: Regular rate and rhythm  ABDOMEN: Soft, Nontender, Nondistended; Bowel sounds present  MUSCULOSKELETAL/EXTREMITIES:  2+ Peripheral Pulses, No LE edema  Left hip incision healing well, AFO brace in place   PSYCH: Appropriate affect  NEURO: Alert & Oriented x 3    I personally reviewed the below data/images/labs:    LABS:                        10.9   7.32  )-----------( 496      ( 28 Apr 2025 07:42 )             33.9       04-28    133  |  99  |  17  ----------------------------<  106  3.8   |  27  |  1.22    Ca    9.0      28 Apr 2025 07:42    TPro  6.5  /  Alb  3.1  /  TBili  0.4  /  DBili  x   /  AST  16  /  ALT  17  /  AlkPhos  122  04-28     Urinalysis Basic - ( 28 Apr 2025 07:42 )    Color: x / Appearance: x / SG: x / pH: x  Gluc: 106 mg/dL / Ketone: x  / Bili: x / Urobili: x   Blood: x / Protein: x / Nitrite: x   Leuk Esterase: x / RBC: x / WBC x   Sq Epi: x / Non Sq Epi: x / Bacteria: x    COVID-19 PCR: NotDetec (04-14-25 @ 17:45)    Consultant(s) Notes Reviewed:   Care Discused with Consultants/Other Providers:  Imaging Personally Reviewed:

## 2025-04-29 NOTE — PROGRESS NOTE ADULT - REASON FOR ADMISSION
Left femoral neck fracture s/p left hemiarthroplasty

## 2025-04-29 NOTE — DISCHARGE NOTE PROVIDER - DETAILS OF MALNUTRITION DIAGNOSIS/DIAGNOSES
This patient has been assessed with a concern for Malnutrition and was treated during this hospitalization for the following Nutrition diagnosis/diagnoses:     -  04/15/2025: Moderate protein-calorie malnutrition

## 2025-04-29 NOTE — CHART NOTE - NSCHARTNOTESELECT_GEN_ALL_CORE
Event Note
Event Note
Phone Call/Event Note
Event Note
Event Note
Nutrition Services
Nutrition Services
Orthopedics/Event Note

## 2025-04-29 NOTE — DISCHARGE NOTE PROVIDER - NSDCCPCAREPLAN_GEN_ALL_CORE_FT
PRINCIPAL DISCHARGE DIAGNOSIS  Diagnosis: Hip fracture  Assessment and Plan of Treatment: You presented to the hospital after a fall that resulted in a hip fracture requiring surgery. You were sent to  acute rehab to help improve your strength and overall function. Please follow up with the following providers listed in this packet for further management. Bring this packet to your follow up appointments.      SECONDARY DISCHARGE DIAGNOSES  Diagnosis: HTN (hypertension)  Assessment and Plan of Treatment: Please continue to take your antihypertensive medications as prescribed. Please follow up with your PCP/Cardiologist for further management.    Diagnosis: Asbestosis  Assessment and Plan of Treatment: Continue your inhalers as prescribed.    Diagnosis: Pain management  Assessment and Plan of Treatment: Continue your analgesics as prescribed.

## 2025-04-29 NOTE — PROGRESS NOTE ADULT - TIME BILLING
MEDICAL COMPLEXITY
Time spent includes direct patient care  (interview and examination of patient), discussion with other providers, support staff and/or patient's family members, review of medical records, ordering diagnostic tests and analyzing results, and documentation.
Time spent includes direct patient care (interview and examination of patient), discussion with other providers, support staff and/or patient's family members, review of medical records, ordering diagnostic tests and analyzing results, and documentation
Time spent includes direct patient care  (interview and examination of patient), discussion with other providers, support staff and/or patient's family members, review of medical records, ordering diagnostic tests and analyzing results, and documentation.
Time spent includes direct patient care (interview and examination of patient), discussion with other providers, support staff and/or patient's family members, review of medical records, ordering diagnostic tests and analyzing results, and documentation
Time spent includes direct patient care (interview and examination of patient), discussion with other providers, support staff and/or patient's family members, review of medical records, ordering diagnostic tests and analyzing results, and documentation
medical complexity
Time spent includes direct patient care (interview and examination of patient), discussion with other providers, support staff and/or patient's family members, review of medical records, ordering diagnostic tests and analyzing results, and documentation

## 2025-04-29 NOTE — DISCHARGE NOTE PROVIDER - CARE PROVIDER_API CALL
Neeru Belcher  Physical/Rehab Medicine  [address] [place] [address]  Phone: [phone]  Fax: [phone]  Follow Up Time: 1 month    Wolfgang Jacome  Internal Medicine  [address] [place] [address]  Phone: [phone]  Fax: [phone]  Follow Up Time: 1 week    Piotr Casanova  Orthopaedic Surgery  [address] [place] [address], [address]  [place] [address]  Phone: [phone]  Fax: [phone]  Follow Up Time: 1 week

## 2025-04-29 NOTE — PROGRESS NOTE ADULT - ASSESSMENT
79M with pMHx of Asthma, HTN, GERD, chronic left foot drop,  h/o asbestosis exposure but no history of COPD- presented to the  ED accompanied by his wife s/p fall in the driveway while using a leaf blower on 4/10. X-ray of hip/femur with Left femoral Neck fracture- s/p Left hemiarthroplasty on 4/11. Not on anticoagulation. Patient with Gait Instability, ADL impairments and Functional impairments.    * Continue functional improvement, ambulating increasing distance  *Ortho team following patient care, informed about dc tomorrow, they plan to remove staples prior to dc tomorrow   * Left hip staples in situ   * Discussed treatment update and dc plan with daughter    #  Left femoral Neck fracture- s/p Left hemiarthroplasty on 4/11.  Gait Instability, ADL impairments and Functional impairments  - [facility] Comprehensive Rehab Program of PT/OT  - Posterior hip precautions  ortho review 4/19  - Staples to be removed on 4/29 or post dc     #History of Cognitive Deficits  - Await SLP eval - mild cognitive impairment and dysphonia with intermittent vocal hoarseness- all deficits are baseline.     #Chronic Left Foot Drop  -Ankle Foot Othosis, carbon fiber: Daily wear for Foot Drop when OOB  - c/w current temporary AFO from orthotist, while awaiting custom AFO    #Pain control  - Acetaminophen reduced to 650 mg q8 hours  -OxyCODONE 5mg PRN q4 hours As needed Severe Pain (7 - 10)    #Asthma/#Asbestosis exposure  -Advair Diskus 250 mcg-50 mcg 1 puff inhaled BID  -Albuterol CFC free 90 mcg/inh inhalation aerosol: 2 puff(s) inhaled every 4 hours, PRN For SOB  -Arformoterol 15 mcg/2 mL inhalation solution:   -Budesonide 0.5 mg/2 mL inhalation suspension: inhaled once a day    #HTN  -Valsartan 40mg daily    #GI/Bowel Mgmt/#GERD  - Continue Senna at bedtime   - Miralax   - Polyethylene glycol BID  - Protonix 40 mg     #Bladder management--voiding     #DVT prophylaxis   - Heparin BID     #Skin:  - Left hip surgical area open to air     FEN   - Diet - DASH Diet    Precautions / PROPHYLAXIS:   - Falls,  - Ortho: Weight bearing status: WBAT   - Lungs: Aspiration  - Pressure injury/Skin: OOB to Chair, PT/OT      HEALTH MAINTEANCE  Hx of cognitive deficits --on f/u with SLP for cognitive assessment  Falls prevention - await Osteoporosis labs   -------------------  liaison with family/providers    4/28- I met daughter Ms Amaya and d/w her patient's functional progress, dc tomorrow and post dc follow up  -------------------  IDT 4.24  Function--ambulating 100ft RW CS 8 stairs with 1 hand rail   Left foot drop--pre fabricated AFO left leg, awaiting customized AFO  Working on endurance   est dc 4/29  --------------------------------------------------------  F/U     Wolfgang Jacome  Internal Medicine  [address] [place] [address]  Phone: [phone]  Fax: [phone]  Follow Up Time:     Piotr Casanova  Orthopaedic Surgery  [address] [facility]  [place] [address]  Phone: [phone]  Fax: [phone]    Spent 53 mins, patient review, discussion of test results, update with ortho, discussion of treatment and functional progres/dc plan with family  79M with pMHx of Asthma, HTN, GERD, chronic left foot drop,  h/o asbestosis exposure but no history of COPD- presented to the  ED accompanied by his wife s/p fall in the driveway while using a leaf blower on 4/10. X-ray of hip/femur with Left femoral Neck fracture- s/p Left hemiarthroplasty on 4/11. Not on anticoagulation. Patient with Gait Instability, ADL impairments and Functional impairments.    * Clinically stable   * Discussed dc plan with daughter and patient, and he is due ortho f/u tomorrow   Ortho informed about dvt ppx post dc and they will f/u with patient on this     #  Left femoral Neck fracture- s/p Left hemiarthroplasty on 4/11.  Gait Instability, ADL impairments and Functional impairments  - [facility] Comprehensive Rehab Program of PT/OT  - Posterior hip precautions  ortho review 4/19  - Staples  removed on 4/28 by ortho team and they will f/u 4/30     #History of Cognitive Deficits  - Await SLP eval - mild cognitive impairment and dysphonia with intermittent vocal hoarseness- all deficits are baseline.     #Chronic Left Foot Drop  -Ankle Foot Othosis, carbon fiber: Daily wear for Foot Drop when OOB  - c/w current temporary AFO from orthotist, while awaiting custom AFO    #Pain control  - Acetaminophen reduced to 650 mg q8 hours  -OxyCODONE 5mg PRN q4 hours As needed Severe Pain (7 - 10)    #Asthma/#Asbestosis exposure  -Advair Diskus 250 mcg-50 mcg 1 puff inhaled BID  -Albuterol CFC free 90 mcg/inh inhalation aerosol: 2 puff(s) inhaled every 4 hours, PRN For SOB  -Arformoterol 15 mcg/2 mL inhalation solution:   -Budesonide 0.5 mg/2 mL inhalation suspension: inhaled once a day    #HTN  -Valsartan 40mg daily    #GI/Bowel Mgmt/#GERD  - Continue Senna at bedtime   - Miralax   - Polyethylene glycol BID  - Protonix 40 mg     #Bladder management--voiding     #DVT prophylaxis   - Heparin BID     #Skin:  - Left hip surgical area open to air     FEN   - Diet - DASH Diet    Precautions / PROPHYLAXIS:   - Falls,  - Ortho: Weight bearing status: WBAT   - Lungs: Aspiration  - Pressure injury/Skin: OOB to Chair, PT/OT      HEALTH MAINTEANCE  Hx of cognitive deficits --on f/u with SLP for cognitive assessment  Falls prevention - await Osteoporosis labs   -------------------  liaison with family/providers    4/29--dc d/w daughter and patient   -------------------  IDT 4.24  Function--ambulating 100ft RW CS 8 stairs with 1 hand rail   Left foot drop--pre fabricated AFO left leg, awaiting customized AFO  Working on endurance   est dc 4/29  --------------------------------------------------------  F/U     Wolfgang Jacome  Internal Medicine  [address] [place] [address]  Phone: [phone]  Fax: [phone]  Follow Up Time:     Piotr Casanova  Orthopaedic Surgery  [address] [facility], [address]  [place] [address]  Phone: [phone]  Fax: [phone]    Spent 53 mins, patient review, discussion of test results, update with ortho, discussion of treatment and functional progres/dc plan with family

## 2025-04-29 NOTE — PROGRESS NOTE ADULT - SUBJECTIVE AND OBJECTIVE BOX
CC: S/p L hip hemiarthroplasty     Today's Subjective & Objective Findings:  Patient seen and examined, and subsequently observed in therapy,  Discussed update treatment and dc plan with daughter during her visit to the rehab unit  Patient reports good night rest  No acute pain    Denies headache, dizziness, visual changes, chest pain, SOB/ROQUE, abdominal pain, nausea, vomiting, diarrhea, dysuria, numbness or tingling.  LBM 4/27.     Therapy  Pt ambulated with  feet close supervision antalgic gait pattern 3 point  gait, increased UE weight bearing, Cues increased step length +left AFO  Pt negotiates 12 steps with 2 hands on one rail, step to pattern, cues for  sequencing CG step to step  Patient maintains hip precautions well without crossing legs while ascending  with left LE and descending with right LE      Vital Signs Last 24 Hrs  T(C): 37.1 (28 Apr 2025 19:56), Max: 37.1 (28 Apr 2025 19:56)  T(F): 98.7 (28 Apr 2025 19:56), Max: 98.7 (28 Apr 2025 19:56)  HR: 74 (29 Apr 2025 06:14) (74 - 78)  BP: 128/70 (29 Apr 2025 06:14) (125/67 - 148/64)  RR: 16 (29 Apr 2025 06:14) (16 - 16)  SpO2: 97% (29 Apr 2025 06:14) (94% - 97%)  O2 Parameters below as of 29 Apr 2025 06:14  Patient On (Oxygen Delivery Method): room air        PHYSICAL EXAM  Constitutional -  Comfortable  Neck - Supple  Cardiovascular - Palpable peripheral pulses   Respiratory/Chest- Symmetrical chest expansion, No dyspnea  Abdomen - Soft, Non-tender  Extremities - No cyanosis, No edema,     Neurologic Exam - Alert, Oriented,   Sensory - Light touch sensation intact  Motor Function - Moves all extremities spontaneously  Skin -  Left hip incision new Aquacel dsg applied- staples underneath.       RECENT LABS/IMAGING                        10.9   7.32  )-----------( 496      ( 28 Apr 2025 07:42 )             33.9     04-28    133[L]  |  99  |  17  ----------------------------<  106[H]  3.8   |  27  |  1.22    Ca    9.0      28 Apr 2025 07:42    TPro  6.5  /  Alb  3.1[L]  /  TBili  0.4  /  DBili  x   /  AST  16  /  ALT  17  /  AlkPhos  122[H]  04-28      Urinalysis Basic - ( 28 Apr 2025 07:42 )    Color: x / Appearance: x / SG: x / pH: x  Gluc: 106 mg/dL / Ketone: x  / Bili: x / Urobili: x   Blood: x / Protein: x / Nitrite: x   Leuk Esterase: x / RBC: x / WBC x   Sq Epi: x / Non Sq Epi: x / Bacteria: x      MEDICATIONS  (STANDING):  acetaminophen     Tablet .. 650 milliGRAM(s) Oral every 8 hours  fluticasone propionate/ salmeterol 250-50 MICROgram(s) Diskus 1 Dose(s) Inhalation two times a day  heparin   Injectable 5000 Unit(s) SubCutaneous every 12 hours  lactobacillus acidophilus 1 Tablet(s) Oral two times a day with meals  melatonin 9 milliGRAM(s) Oral at bedtime  oxyCODONE    IR 5 milliGRAM(s) Oral <User Schedule>  pantoprazole    Tablet 40 milliGRAM(s) Oral before breakfast  polyethylene glycol 3350 17 Gram(s) Oral two times a day  senna 2 Tablet(s) Oral at bedtime  valsartan 40 milliGRAM(s) Oral daily    MEDICATIONS  (PRN):  albuterol    90 MICROgram(s) HFA Inhaler 2 Puff(s) Inhalation every 6 hours PRN Shortness of Breath  oxyCODONE    IR 2.5 milliGRAM(s) Oral every 4 hours PRN Moderate Pain (4 - 6)  oxyCODONE    IR 5 milliGRAM(s) Oral every 4 hours PRN Severe Pain (7 - 10)             CC: S/p L hip hemiarthroplasty     Today's Subjective & Objective Findings:  Patient seen and examined, with daughter at bedside  Reports good night rest   No acute pain   Daughter reports that patient is due f/u with ortho tomorrow post surgery   Ortho team has been seeing patient during his acute rehab treatment and they removed the staples yesterday     Denies headache, dizziness, visual changes, chest pain, SOB/ROQUE, abdominal pain, nausea, vomiting, diarrhea, dysuria, numbness or tingling.  LBM 4/28    Therapy  Pt ambulated with  feet close supervision antalgic gait pattern 3 point  gait, increased UE weight bearing, Cues increased step length +left AFO  Pt negotiates 12 steps with 2 hands on one rail, step to pattern, cues for  sequencing CG step to step  Patient maintains hip precautions well without crossing legs while ascending  with left LE and descending with right LE      Vital Signs Last 24 Hrs  T(C): 37.1 (28 Apr 2025 19:56), Max: 37.1 (28 Apr 2025 19:56)  T(F): 98.7 (28 Apr 2025 19:56), Max: 98.7 (28 Apr 2025 19:56)  HR: 74 (29 Apr 2025 06:14) (74 - 78)  BP: 128/70 (29 Apr 2025 06:14) (125/67 - 148/64)  RR: 16 (29 Apr 2025 06:14) (16 - 16)  SpO2: 97% (29 Apr 2025 06:14) (94% - 97%)  O2 Parameters below as of 29 Apr 2025 06:14  Patient On (Oxygen Delivery Method): room air      PHYSICAL EXAM  Constitutional -  Comfortable  Neck - Supple  Cardiovascular - Palpable peripheral pulses   Respiratory/Chest- Symmetrical chest expansion, No dyspnea  Abdomen - Soft, Non-tender  Extremities - No cyanosis, No edema,     Neurologic Exam - Alert, Oriented,   Sensory - Light touch sensation intact  Motor Function - Moves all extremities spontaneously  Skin -  Left hip surgical area open to air       RECENT LABS/IMAGING                        10.9   7.32  )-----------( 496      ( 28 Apr 2025 07:42 )             33.9     04-28    133[L]  |  99  |  17  ----------------------------<  106[H]  3.8   |  27  |  1.22    Ca    9.0      28 Apr 2025 07:42    TPro  6.5  /  Alb  3.1[L]  /  TBili  0.4  /  DBili  x   /  AST  16  /  ALT  17  /  AlkPhos  122[H]  04-28      Urinalysis Basic - ( 28 Apr 2025 07:42 )    Color: x / Appearance: x / SG: x / pH: x  Gluc: 106 mg/dL / Ketone: x  / Bili: x / Urobili: x   Blood: x / Protein: x / Nitrite: x   Leuk Esterase: x / RBC: x / WBC x   Sq Epi: x / Non Sq Epi: x / Bacteria: x      MEDICATIONS  (STANDING):  acetaminophen     Tablet .. 650 milliGRAM(s) Oral every 8 hours  fluticasone propionate/ salmeterol 250-50 MICROgram(s) Diskus 1 Dose(s) Inhalation two times a day  heparin   Injectable 5000 Unit(s) SubCutaneous every 12 hours  lactobacillus acidophilus 1 Tablet(s) Oral two times a day with meals  melatonin 9 milliGRAM(s) Oral at bedtime  oxyCODONE    IR 5 milliGRAM(s) Oral <User Schedule>  pantoprazole    Tablet 40 milliGRAM(s) Oral before breakfast  polyethylene glycol 3350 17 Gram(s) Oral two times a day  senna 2 Tablet(s) Oral at bedtime  valsartan 40 milliGRAM(s) Oral daily    MEDICATIONS  (PRN):  albuterol    90 MICROgram(s) HFA Inhaler 2 Puff(s) Inhalation every 6 hours PRN Shortness of Breath  oxyCODONE    IR 2.5 milliGRAM(s) Oral every 4 hours PRN Moderate Pain (4 - 6)  oxyCODONE    IR 5 milliGRAM(s) Oral every 4 hours PRN Severe Pain (7 - 10)

## 2025-04-29 NOTE — DISCHARGE NOTE PROVIDER - PROVIDER TOKENS
PROVIDER:[TOKEN:[[id number]],FOLLOWUP:[1 month]],PROVIDER:[TOKEN:[[id number]],FOLLOWUP:[1 week]],PROVIDER:[TOKEN:[[id number]],FOLLOWUP:[1 week]]

## 2025-05-20 ENCOUNTER — NON-APPOINTMENT (OUTPATIENT)
Age: 80
End: 2025-05-20

## 2025-05-30 ENCOUNTER — NON-APPOINTMENT (OUTPATIENT)
Age: 80
End: 2025-05-30

## 2025-05-30 ENCOUNTER — APPOINTMENT (OUTPATIENT)
Dept: PHYSICAL MEDICINE AND REHAB | Facility: CLINIC | Age: 80
End: 2025-05-30
Payer: MEDICARE

## 2025-05-30 VITALS
SYSTOLIC BLOOD PRESSURE: 144 MMHG | BODY MASS INDEX: 26.68 KG/M2 | HEART RATE: 76 BPM | HEIGHT: 67 IN | DIASTOLIC BLOOD PRESSURE: 80 MMHG | OXYGEN SATURATION: 94 % | TEMPERATURE: 98.2 F | WEIGHT: 170 LBS

## 2025-05-30 DIAGNOSIS — S72.92XA UNSPECIFIED FRACTURE OF LEFT FEMUR, INITIAL ENCOUNTER FOR CLOSED FRACTURE: ICD-10-CM

## 2025-05-30 DIAGNOSIS — Z74.09 OTHER REDUCED MOBILITY: ICD-10-CM

## 2025-05-30 DIAGNOSIS — R26.81 UNSTEADINESS ON FEET: ICD-10-CM

## 2025-05-30 PROCEDURE — 99215 OFFICE O/P EST HI 40 MIN: CPT

## 2025-06-02 PROCEDURE — 97161 PT EVAL LOW COMPLEX 20 MIN: CPT | Mod: GP

## 2025-06-02 PROCEDURE — 85025 COMPLETE CBC W/AUTO DIFF WBC: CPT

## 2025-06-02 PROCEDURE — 36415 COLL VENOUS BLD VENIPUNCTURE: CPT

## 2025-06-02 PROCEDURE — 84443 ASSAY THYROID STIM HORMONE: CPT

## 2025-06-02 PROCEDURE — 97110 THERAPEUTIC EXERCISES: CPT | Mod: GO

## 2025-06-02 PROCEDURE — 82652 VIT D 1 25-DIHYDROXY: CPT

## 2025-06-02 PROCEDURE — 87635 SARS-COV-2 COVID-19 AMP PRB: CPT

## 2025-06-02 PROCEDURE — 83970 ASSAY OF PARATHORMONE: CPT

## 2025-06-02 PROCEDURE — 82607 VITAMIN B-12: CPT

## 2025-06-02 PROCEDURE — 97535 SELF CARE MNGMENT TRAINING: CPT | Mod: GO

## 2025-06-02 PROCEDURE — 92523 SPEECH SOUND LANG COMPREHEN: CPT | Mod: GN

## 2025-06-02 PROCEDURE — 80053 COMPREHEN METABOLIC PANEL: CPT

## 2025-06-02 PROCEDURE — 97112 NEUROMUSCULAR REEDUCATION: CPT | Mod: GP

## 2025-06-02 PROCEDURE — 97116 GAIT TRAINING THERAPY: CPT | Mod: GP

## 2025-06-02 PROCEDURE — 97530 THERAPEUTIC ACTIVITIES: CPT | Mod: GP

## 2025-06-02 PROCEDURE — 97165 OT EVAL LOW COMPLEX 30 MIN: CPT | Mod: GO

## 2025-06-02 PROCEDURE — 94640 AIRWAY INHALATION TREATMENT: CPT

## (undated) DEVICE — SOL IRR POUR NS 0.9% 500ML

## (undated) DEVICE — SUT POLYSORB 0 30" GS-21 UNDYED

## (undated) DEVICE — DRAPE TOWEL BLUE 17" X 24"

## (undated) DEVICE — DRAPE C ARM C-ARMOUR

## (undated) DEVICE — POSITIONER STRAP ARMBOARD VELCRO TS-30

## (undated) DEVICE — GLV 8 PROTEXIS (BLUE)

## (undated) DEVICE — SOLIDIFIER CANN EXPRESS 3K

## (undated) DEVICE — FOLEY HOLDER STATLOCK 2 WAY ADULT

## (undated) DEVICE — VENODYNE/SCD SLEEVE CALF LARGE

## (undated) DEVICE — SUT POLYSORB 2-0 30" GS-21 UNDYED

## (undated) DEVICE — POSITIONER FOAM HEAD CRADLE (PINK)

## (undated) DEVICE — DRAPE INSTRUMENT POUCH 6.75" X 11"

## (undated) DEVICE — DRSG ACE BANDAGE 4" NS

## (undated) DEVICE — WARMING BLANKET UPPER ADULT

## (undated) DEVICE — FOLEY TRAY 16FR SURESTEP LTX BG

## (undated) DEVICE — SOL IRR POUR H2O 1500ML

## (undated) DEVICE — STAPLER SKIN VISI-STAT 35 WIDE

## (undated) DEVICE — SUT POLYSORB 1-0 36" GS-21 UNDYED

## (undated) DEVICE — PREP CHLORAPREP HI-LITE ORANGE 26ML

## (undated) DEVICE — LAP PAD 18 X 18"

## (undated) DEVICE — GLV 8 PROTEXIS (WHITE)

## (undated) DEVICE — PACK MINOR

## (undated) DEVICE — DRAPE ISOLATION 125X83"

## (undated) DEVICE — ZIMMER PULSAVAC PLUS FAN KIT

## (undated) DEVICE — CANISTER SUCTION LID GUARD 3000CC